# Patient Record
Sex: FEMALE | Race: WHITE | Employment: UNEMPLOYED | URBAN - METROPOLITAN AREA
[De-identification: names, ages, dates, MRNs, and addresses within clinical notes are randomized per-mention and may not be internally consistent; named-entity substitution may affect disease eponyms.]

---

## 2019-03-24 ENCOUNTER — HOSPITAL ENCOUNTER (EMERGENCY)
Age: 34
Discharge: HOME | End: 2019-03-24
Attending: EMERGENCY MEDICINE | Admitting: EMERGENCY MEDICINE
Payer: MEDICAID

## 2019-03-24 VITALS
OXYGEN SATURATION: 99 % | WEIGHT: 160 LBS | DIASTOLIC BLOOD PRESSURE: 89 MMHG | TEMPERATURE: 98.1 F | HEART RATE: 85 BPM | SYSTOLIC BLOOD PRESSURE: 145 MMHG | BODY MASS INDEX: 25.11 KG/M2 | HEIGHT: 67 IN | RESPIRATION RATE: 20 BRPM

## 2019-03-24 DIAGNOSIS — K08.89 PAIN, DENTAL: Primary | ICD-10-CM

## 2019-03-24 PROCEDURE — 74011250637 HC RX REV CODE- 250/637: Performed by: EMERGENCY MEDICINE

## 2019-03-24 PROCEDURE — 99283 EMERGENCY DEPT VISIT LOW MDM: CPT | Performed by: EMERGENCY MEDICINE

## 2019-03-24 RX ORDER — IBUPROFEN 800 MG/1
800 TABLET ORAL
Qty: 20 TAB | Refills: 0 | Status: SHIPPED | OUTPATIENT
Start: 2019-03-24 | End: 2019-03-31

## 2019-03-24 RX ORDER — HYDROCODONE BITARTRATE AND ACETAMINOPHEN 7.5; 325 MG/1; MG/1
1 TABLET ORAL
Status: COMPLETED | OUTPATIENT
Start: 2019-03-24 | End: 2019-03-24

## 2019-03-24 RX ORDER — PENICILLIN V POTASSIUM 500 MG/1
500 TABLET, FILM COATED ORAL 3 TIMES DAILY
Qty: 21 TAB | Refills: 0 | Status: SHIPPED | OUTPATIENT
Start: 2019-03-24 | End: 2019-03-31

## 2019-03-24 RX ORDER — TRAMADOL HYDROCHLORIDE 50 MG/1
50 TABLET ORAL
Qty: 12 TAB | Refills: 0 | Status: SHIPPED | OUTPATIENT
Start: 2019-03-24 | End: 2019-03-27

## 2019-03-24 RX ADMIN — HYDROCODONE BITARTRATE AND ACETAMINOPHEN 1 TABLET: 7.5; 325 TABLET ORAL at 03:01

## 2019-03-24 NOTE — ED TRIAGE NOTES
C/o dental pain r/t left lower. Broken off tooth. Onset couple days pta with worsening tonight. Poor dentition

## 2019-03-24 NOTE — ED NOTES
I have reviewed discharge instructions with the patient. The patient verbalized understanding. Patient left ED via Discharge Method: ambulatory to Home with . Opportunity for questions and clarification provided. Patient given 3 scripts. Medication explained to pt and pt v\u about the medication. Pt in no acute distress at discharge. To continue your aftercare when you leave the hospital, you may receive an automated call from our care team to check in on how you are doing. This is a free service and part of our promise to provide the best care and service to meet your aftercare needs.  If you have questions, or wish to unsubscribe from this service please call [phone]. Thank you for Choosing our [facility].

## 2019-03-24 NOTE — DISCHARGE INSTRUCTIONS
Patient Education        Head or Face Pain: Care Instructions  Your Care Instructions    Common causes of head or face pain are allergies, stress, and injuries. Other causes include tooth problems and sinus infections. Eating certain foods, such as chocolate or cheese, or drinking certain liquids, such as coffee or cola, can cause head pain for some people. If you have mild head pain, you may not need treatment. It is important to watch your symptoms and talk to your doctor if your pain continues or gets worse. Follow-up care is a key part of your treatment and safety. Be sure to make and go to all appointments, and call your doctor if you are having problems. It's also a good idea to know your test results and keep a list of the medicines you take. How can you care for yourself at home? · Take pain medicines exactly as directed. ? If the doctor gave you a prescription medicine for pain, take it as prescribed. ? If you are not taking a prescription pain medicine, ask your doctor if you can take an over-the-counter pain medicine. · Take it easy for the next few days or longer if you are not feeling well. · Use a warm, moist towel or heating pad set on low to relax tight muscles in your shoulder and neck. Have someone gently massage your neck and shoulders. · Put ice or a cold pack on the area for 10 to 20 minutes at a time. Put a thin cloth between the ice and your skin. When should you call for help? Call 911 anytime you think you may need emergency care. For example, call if:    · You have twitching, jerking, or a seizure.     · You passed out (lost consciousness).     · You have symptoms of a stroke. These may include:  ? Sudden numbness, tingling, weakness, or loss of movement in your face, arm, or leg, especially on only one side of your body. ? Sudden vision changes. ? Sudden trouble speaking. ? Sudden confusion or trouble understanding simple statements.   ? Sudden problems with walking or balance. ? A sudden, severe headache that is different from past headaches.     · You have jaw pain and pain in your chest, shoulder, neck, or arm.    Call your doctor now or seek immediate medical care if:    · You have a fever with a stiff neck or a severe headache.     · You have nausea and vomiting, or you cannot keep food or liquids down.    Watch closely for changes in your health, and be sure to contact your doctor if:    · Your head or face pain does not get better as expected. Where can you learn more? Go to http://sheri-dennise.info/. Enter P568 in the search box to learn more about \"Head or Face Pain: Care Instructions. \"  Current as of: September 23, 2018  Content Version: 11.9  © 7510-5004 [employer]. Care instructions adapted under license by [employer] (which disclaims liability or warranty for this information). If you have questions about a medical condition or this instruction, always ask your healthcare professional. [place] [address] any warranty or liability for your use of this information. Patient Education        Tooth and Gum Pain: Care Instructions  Your Care Instructions    The most common causes of dental pain are tooth decay and gum disease. Pain can also be caused by an infection of the tooth (abscess) or the gums. Or you may have pain from a broken or cracked tooth. Other causes of pain include infection and damage to a tooth from nervous grinding of your teeth. A wisdom tooth can be painful when it is coming in but cannot break through the gum. It can also be painful when the tooth is only partway in and extra gum tissue has formed around it. The tissue can get inflamed (pericoronitis), and sometimes it gets infected. Prompt dental care can help find the cause of your toothache and keep the tooth from dying or gum disease from getting worse. Self-care at home may reduce your pain and discomfort.   Follow-up care is a key part of your treatment and safety. Be sure to make and go to all appointments, and call your dentist or doctor if you are having problems. It's also a good idea to know your test results and keep a list of the medicines you take. How can you care for yourself at home? · To reduce pain and facial swelling, put an ice or cold pack on the outside of your cheek for 10 to 20 minutes at a time. Put a thin cloth between the ice and your skin. Do not use heat. · If your doctor prescribed antibiotics, take them as directed. Do not stop taking them just because you feel better. You need to take the full course of antibiotics. · Ask your doctor if you can take an over-the-counter pain medicine, such as acetaminophen (Tylenol), ibuprofen (Advil, Motrin), or naproxen (Aleve). Be safe with medicines. Read and follow all instructions on the label. · Avoid very hot, cold, or sweet foods and drinks if they increase your pain. · Rinse your mouth with warm salt water every 2 hours to help relieve pain and swelling. Mix 1 teaspoon of salt in 8 ounces of water. · Talk to your dentist about using special toothpaste for sensitive teeth. To reduce pain on contact with heat or cold or when brushing, brush with this toothpaste regularly or rub a small amount of the paste on the sensitive area with a clean finger 2 or 3 times a day. Floss gently between your teeth. · Do not smoke or use spit tobacco. Tobacco use can make gum problems worse, decreases your ability to fight infection in your gums, and delays healing. If you need help quitting, talk to your doctor about stop-smoking programs and medicines. These can increase your chances of quitting for good. When should you call for help? Call 911 anytime you think you may need emergency care.  For example, call if:    · You have trouble breathing.    Call your dentist or doctor now or seek immediate medical care if:    · You have signs of infection, such as:  ? Increased pain, swelling, warmth, or redness. ? Red streaks leading from the area. ? Pus draining from the area. ? A fever.    Watch closely for changes in your health, and be sure to contact your doctor if:    · You do not get better as expected. Where can you learn more? Go to http://sheri-dennise.info/. Enter 0363 [id number] in the search box to learn more about \"Tooth and Gum Pain: Care Instructions. \"  Current as of: March 27, 2018  Content Version: 11.9  © 9982-8739 [employer]. Care instructions adapted under license by [employer] (which disclaims liability or warranty for this information). If you have questions about a medical condition or this instruction, always ask your healthcare professional. [address] any warranty or liability for your use of this information.

## 2019-03-25 NOTE — ED PROVIDER NOTES
Presents with complaints of left lower dental pain. Patient has poor dentition and sees a Dentist in [facility]. Reports pain into her left ear worsening this evening. She is also a smoker. The history is provided by the patient. Dental Pain This is a new problem. The current episode started yesterday. The problem occurs constantly. The problem has been rapidly worsening. The pain is located in the left lower mouth. The pain is severe. There was no vomiting, no nausea, no fever and no swelling. She has tried aspirin for the symptoms. The treatment provided no relief. No past medical history on file. No past surgical history on file. No family history on file. Social History Socioeconomic History  Marital status: SINGLE Spouse name: Not on file  Number of children: Not on file  Years of education: Not on file  Highest education level: Not on file Occupational History  Not on file Social Needs  Financial resource strain: Not on file  Food insecurity:  
  Worry: Not on file Inability: Not on file  Transportation needs:  
  Medical: Not on file Non-medical: Not on file Tobacco Use  Smoking status: Current Every Day Smoker Packs/day: 1.00 Substance and Sexual Activity  Alcohol use: No  
 Drug use: No  
 Sexual activity: Not on file Lifestyle  Physical activity:  
  Days per week: Not on file Minutes per session: Not on file  Stress: Not on file Relationships  Social connections:  
  Talks on phone: Not on file Gets together: Not on file Attends Restoration service: Not on file Active member of club or organization: Not on file Attends meetings of clubs or organizations: Not on file Relationship status: Not on file  Intimate partner violence:  
  Fear of current or ex partner: Not on file Emotionally abused: Not on file Physically abused: Not on file Forced sexual activity: Not on file Other Topics Concern  Not on file Social History Narrative  Not on file ALLERGIES: Patient has no known allergies. Review of Systems HENT: Positive for dental problem. All other systems reviewed and are negative. Vitals:  
 03/24/19 0240 03/24/19 0304 BP: (!) 155/93 145/89 Pulse: 92 85 Resp: 20 20 Temp: 97.4 °F (36.3 °C) 98.1 °F (36.7 °C) SpO2: 99% 99% Weight: 72.6 kg (160 lb) Height: 5' 7\" (1.702 m) Physical Exam  
Constitutional: She is oriented to person, place, and time. She appears well-developed and well-nourished. No distress. HENT:  
Head: Normocephalic and atraumatic. Left Ear: External ear normal.  
Poor dentition throughout no abscess or cheek swelling. Left lower molar is brown and rotten. Left TM normal  
Eyes: Conjunctivae are normal. Right eye exhibits no discharge. Left eye exhibits no discharge. Neck: Normal range of motion. Neck supple. Musculoskeletal: Normal range of motion. She exhibits no edema. Neurological: She is alert and oriented to person, place, and time. No cranial nerve deficit. Skin: Skin is warm and dry. Capillary refill takes less than 2 seconds. She is not diaphoretic. Psychiatric: She has a normal mood and affect. Her behavior is normal.  
Nursing note and vitals reviewed. MDM Number of Diagnoses or Management Options Pain, dental:  
Diagnosis management comments: Patient given a dose of pain medication here and antibiotics and pain meds to go home with. SHe should follow up with her dentist. 
 
Risk of Complications, Morbidity, and/or Mortality Presenting problems: low Diagnostic procedures: low Management options: low Patient Progress Patient progress: improved Procedures

## 2019-04-20 ENCOUNTER — APPOINTMENT (OUTPATIENT)
Dept: GENERAL RADIOLOGY | Age: 34
End: 2019-04-20
Attending: EMERGENCY MEDICINE
Payer: MEDICAID

## 2019-04-20 ENCOUNTER — HOSPITAL ENCOUNTER (OUTPATIENT)
Age: 34
Setting detail: OBSERVATION
Discharge: HOME | End: 2019-04-21
Attending: EMERGENCY MEDICINE | Admitting: INTERNAL MEDICINE
Payer: MEDICAID

## 2019-04-20 ENCOUNTER — APPOINTMENT (OUTPATIENT)
Dept: CT IMAGING | Age: 34
End: 2019-04-20
Attending: EMERGENCY MEDICINE
Payer: MEDICAID

## 2019-04-20 DIAGNOSIS — R47.9 DIFFICULTY WITH SPEECH: ICD-10-CM

## 2019-04-20 DIAGNOSIS — F80.81 STUTTERING: ICD-10-CM

## 2019-04-20 DIAGNOSIS — R07.9 CHEST PAIN, UNSPECIFIED TYPE: Primary | ICD-10-CM

## 2019-04-20 PROBLEM — R11.2 NAUSEA AND VOMITING: Status: ACTIVE | Noted: 2019-04-20

## 2019-04-20 PROBLEM — F17.210 SMOKING GREATER THAN 20 PACK YEARS: Status: ACTIVE | Noted: 2019-04-20

## 2019-04-20 PROBLEM — R47.1 DYSARTHRIA: Status: ACTIVE | Noted: 2019-04-20

## 2019-04-20 PROBLEM — D72.829 LEUKOCYTOSIS: Status: ACTIVE | Noted: 2019-04-20

## 2019-04-20 LAB
ALBUMIN SERPL-MCNC: 4 G/DL (ref 3.5–5)
ALBUMIN/GLOB SERPL: 1.4 {RATIO} (ref 1.2–3.5)
ALP SERPL-CCNC: 94 U/L (ref 50–136)
ALT SERPL-CCNC: 19 U/L (ref 12–65)
AMPHET UR QL SCN: POSITIVE
ANION GAP SERPL CALC-SCNC: 7 MMOL/L (ref 7–16)
AST SERPL-CCNC: 17 U/L (ref 15–37)
BARBITURATES UR QL SCN: NEGATIVE
BASOPHILS # BLD: 0 K/UL (ref 0–0.2)
BASOPHILS NFR BLD: 0 % (ref 0–2)
BENZODIAZ UR QL: NEGATIVE
BILIRUB SERPL-MCNC: 0.6 MG/DL (ref 0.2–1.1)
BUN SERPL-MCNC: 9 MG/DL (ref 6–23)
CALCIUM SERPL-MCNC: 8.9 MG/DL (ref 8.3–10.4)
CANNABINOIDS UR QL SCN: NEGATIVE
CHLORIDE SERPL-SCNC: 102 MMOL/L (ref 98–107)
CO2 SERPL-SCNC: 31 MMOL/L (ref 21–32)
COCAINE UR QL SCN: NEGATIVE
CREAT SERPL-MCNC: 0.75 MG/DL (ref 0.6–1)
D DIMER PPP FEU-MCNC: <0.27 UG/ML(FEU)
DIFFERENTIAL METHOD BLD: ABNORMAL
EOSINOPHIL # BLD: 0.1 K/UL (ref 0–0.8)
EOSINOPHIL NFR BLD: 1 % (ref 0.5–7.8)
ERYTHROCYTE [DISTWIDTH] IN BLOOD BY AUTOMATED COUNT: 12.2 % (ref 11.9–14.6)
ETHANOL SERPL-MCNC: <3 MG/DL
GLOBULIN SER CALC-MCNC: 2.9 G/DL (ref 2.3–3.5)
GLUCOSE BLD STRIP.AUTO-MCNC: 99 MG/DL (ref 65–100)
GLUCOSE SERPL-MCNC: 93 MG/DL (ref 65–100)
HCT VFR BLD AUTO: 42.5 % (ref 35.8–46.3)
HGB BLD-MCNC: 14 G/DL (ref 11.7–15.4)
IMM GRANULOCYTES # BLD AUTO: 0 K/UL (ref 0–0.5)
IMM GRANULOCYTES NFR BLD AUTO: 0 % (ref 0–5)
INR BLD: 1 (ref 0.9–1.2)
LYMPHOCYTES # BLD: 4.1 K/UL (ref 0.5–4.6)
LYMPHOCYTES NFR BLD: 35 % (ref 13–44)
MCH RBC QN AUTO: 28.7 PG (ref 26.1–32.9)
MCHC RBC AUTO-ENTMCNC: 32.9 G/DL (ref 31.4–35)
MCV RBC AUTO: 87.3 FL (ref 79.6–97.8)
METHADONE UR QL: NEGATIVE
MONOCYTES # BLD: 0.5 K/UL (ref 0.1–1.3)
MONOCYTES NFR BLD: 5 % (ref 4–12)
NEUTS SEG # BLD: 6.7 K/UL (ref 1.7–8.2)
NEUTS SEG NFR BLD: 58 % (ref 43–78)
NRBC # BLD: 0 K/UL (ref 0–0.2)
OPIATES UR QL: POSITIVE
PCP UR QL: NEGATIVE
PLATELET # BLD AUTO: 286 K/UL (ref 150–450)
PMV BLD AUTO: 10.2 FL (ref 9.4–12.3)
POTASSIUM SERPL-SCNC: 3.6 MMOL/L (ref 3.5–5.1)
PROT SERPL-MCNC: 6.9 G/DL (ref 6.3–8.2)
PT BLD: 12.4 SECS (ref 9.6–11.6)
RBC # BLD AUTO: 4.87 M/UL (ref 4.05–5.2)
SODIUM SERPL-SCNC: 140 MMOL/L (ref 136–145)
TROPONIN I BLD-MCNC: 0 NG/ML (ref 0.02–0.05)
WBC # BLD AUTO: 11.5 K/UL (ref 4.3–11.1)

## 2019-04-20 PROCEDURE — 85025 COMPLETE CBC W/AUTO DIFF WBC: CPT

## 2019-04-20 PROCEDURE — 96372 THER/PROPH/DIAG INJ SC/IM: CPT

## 2019-04-20 PROCEDURE — 99285 EMERGENCY DEPT VISIT HI MDM: CPT | Performed by: EMERGENCY MEDICINE

## 2019-04-20 PROCEDURE — 96361 HYDRATE IV INFUSION ADD-ON: CPT

## 2019-04-20 PROCEDURE — 85610 PROTHROMBIN TIME: CPT

## 2019-04-20 PROCEDURE — 80307 DRUG TEST PRSMV CHEM ANLYZR: CPT

## 2019-04-20 PROCEDURE — 84484 ASSAY OF TROPONIN QUANT: CPT

## 2019-04-20 PROCEDURE — 80053 COMPREHEN METABOLIC PANEL: CPT

## 2019-04-20 PROCEDURE — 81003 URINALYSIS AUTO W/O SCOPE: CPT

## 2019-04-20 PROCEDURE — 82962 GLUCOSE BLOOD TEST: CPT

## 2019-04-20 PROCEDURE — 96360 HYDRATION IV INFUSION INIT: CPT

## 2019-04-20 PROCEDURE — 74011250636 HC RX REV CODE- 250/636: Performed by: INTERNAL MEDICINE

## 2019-04-20 PROCEDURE — 85379 FIBRIN DEGRADATION QUANT: CPT

## 2019-04-20 PROCEDURE — 70450 CT HEAD/BRAIN W/O DYE: CPT

## 2019-04-20 PROCEDURE — 81025 URINE PREGNANCY TEST: CPT

## 2019-04-20 PROCEDURE — 74011250637 HC RX REV CODE- 250/637: Performed by: INTERNAL MEDICINE

## 2019-04-20 PROCEDURE — 99218 HC RM OBSERVATION: CPT

## 2019-04-20 PROCEDURE — 77030020263 HC SOL INJ SOD CL0.9% LFCR 1000ML

## 2019-04-20 PROCEDURE — 71045 X-RAY EXAM CHEST 1 VIEW: CPT

## 2019-04-20 PROCEDURE — 77030032490 HC SLV COMPR SCD KNE COVD -B

## 2019-04-20 PROCEDURE — 93005 ELECTROCARDIOGRAM TRACING: CPT | Performed by: INTERNAL MEDICINE

## 2019-04-20 RX ORDER — SODIUM CHLORIDE 9 MG/ML
125 INJECTION, SOLUTION INTRAVENOUS CONTINUOUS
Status: DISCONTINUED | OUTPATIENT
Start: 2019-04-20 | End: 2019-04-21 | Stop reason: HOSPADM

## 2019-04-20 RX ORDER — ONDANSETRON 2 MG/ML
4 INJECTION INTRAMUSCULAR; INTRAVENOUS
Status: DISCONTINUED | OUTPATIENT
Start: 2019-04-20 | End: 2019-04-21 | Stop reason: HOSPADM

## 2019-04-20 RX ORDER — IBUPROFEN 200 MG
1 TABLET ORAL DAILY
Status: DISCONTINUED | OUTPATIENT
Start: 2019-04-21 | End: 2019-04-21 | Stop reason: HOSPADM

## 2019-04-20 RX ORDER — PRAVASTATIN SODIUM 20 MG/1
40 TABLET ORAL
Status: DISCONTINUED | OUTPATIENT
Start: 2019-04-20 | End: 2019-04-21 | Stop reason: HOSPADM

## 2019-04-20 RX ORDER — GUAIFENESIN 100 MG/5ML
81 LIQUID (ML) ORAL DAILY
Status: DISCONTINUED | OUTPATIENT
Start: 2019-04-21 | End: 2019-04-21 | Stop reason: HOSPADM

## 2019-04-20 RX ORDER — SODIUM CHLORIDE 0.9 % (FLUSH) 0.9 %
5-40 SYRINGE (ML) INJECTION AS NEEDED
Status: DISCONTINUED | OUTPATIENT
Start: 2019-04-20 | End: 2019-04-21 | Stop reason: HOSPADM

## 2019-04-20 RX ORDER — ENOXAPARIN SODIUM 100 MG/ML
40 INJECTION SUBCUTANEOUS EVERY 24 HOURS
Status: DISCONTINUED | OUTPATIENT
Start: 2019-04-20 | End: 2019-04-21 | Stop reason: HOSPADM

## 2019-04-20 RX ORDER — SODIUM CHLORIDE 0.9 % (FLUSH) 0.9 %
5-40 SYRINGE (ML) INJECTION EVERY 8 HOURS
Status: DISCONTINUED | OUTPATIENT
Start: 2019-04-20 | End: 2019-04-21 | Stop reason: HOSPADM

## 2019-04-20 RX ADMIN — SODIUM CHLORIDE 125 ML/HR: 900 INJECTION, SOLUTION INTRAVENOUS at 22:23

## 2019-04-20 RX ADMIN — ENOXAPARIN SODIUM 40 MG: 40 INJECTION SUBCUTANEOUS at 22:24

## 2019-04-20 RX ADMIN — Medication 5 ML: at 22:23

## 2019-04-21 ENCOUNTER — APPOINTMENT (OUTPATIENT)
Dept: MRI IMAGING | Age: 34
End: 2019-04-21
Attending: INTERNAL MEDICINE
Payer: MEDICAID

## 2019-04-21 ENCOUNTER — APPOINTMENT (OUTPATIENT)
Dept: ULTRASOUND IMAGING | Age: 34
End: 2019-04-21
Attending: NURSE PRACTITIONER
Payer: MEDICAID

## 2019-04-21 VITALS
WEIGHT: 160.05 LBS | OXYGEN SATURATION: 95 % | DIASTOLIC BLOOD PRESSURE: 87 MMHG | TEMPERATURE: 97.8 F | RESPIRATION RATE: 17 BRPM | SYSTOLIC BLOOD PRESSURE: 129 MMHG | BODY MASS INDEX: 25.07 KG/M2 | HEART RATE: 84 BPM

## 2019-04-21 LAB
APPEARANCE UR: NORMAL
ATRIAL RATE: 76 BPM
BILIRUB UR QL: NEGATIVE
CALCULATED P AXIS, ECG09: -44 DEGREES
CALCULATED R AXIS, ECG10: -36 DEGREES
CALCULATED T AXIS, ECG11: 47 DEGREES
CHOLEST SERPL-MCNC: 179 MG/DL
COLOR UR: YELLOW
DIAGNOSIS, 93000: NORMAL
ERYTHROCYTE [DISTWIDTH] IN BLOOD BY AUTOMATED COUNT: 12.2 % (ref 11.9–14.6)
EST. AVERAGE GLUCOSE BLD GHB EST-MCNC: 120 MG/DL
GLUCOSE UR STRIP.AUTO-MCNC: NEGATIVE MG/DL
HBA1C MFR BLD: 5.8 % (ref 4.8–6)
HCG UR QL: NEGATIVE
HCT VFR BLD AUTO: 41.4 % (ref 35.8–46.3)
HDLC SERPL-MCNC: 31 MG/DL (ref 40–60)
HDLC SERPL: 5.8 {RATIO}
HGB BLD-MCNC: 13.5 G/DL (ref 11.7–15.4)
HGB UR QL STRIP: NEGATIVE
KETONES UR QL STRIP.AUTO: NEGATIVE MG/DL
LDLC SERPL CALC-MCNC: 101.6 MG/DL
LEUKOCYTE ESTERASE UR QL STRIP.AUTO: NEGATIVE
LIPID PROFILE,FLP: ABNORMAL
MAGNESIUM SERPL-MCNC: 1.9 MG/DL (ref 1.8–2.4)
MCH RBC QN AUTO: 28.7 PG (ref 26.1–32.9)
MCHC RBC AUTO-ENTMCNC: 32.6 G/DL (ref 31.4–35)
MCV RBC AUTO: 87.9 FL (ref 79.6–97.8)
NITRITE UR QL STRIP.AUTO: NEGATIVE
NRBC # BLD: 0 K/UL (ref 0–0.2)
P-R INTERVAL, ECG05: 120 MS
PH UR STRIP: 6 [PH] (ref 5–9)
PHOSPHATE SERPL-MCNC: 3.6 MG/DL (ref 2.5–4.5)
PLATELET # BLD AUTO: 262 K/UL (ref 150–450)
PMV BLD AUTO: 10.2 FL (ref 9.4–12.3)
PROT UR STRIP-MCNC: NEGATIVE MG/DL
Q-T INTERVAL, ECG07: 390 MS
QRS DURATION, ECG06: 90 MS
QTC CALCULATION (BEZET), ECG08: 438 MS
RBC # BLD AUTO: 4.71 M/UL (ref 4.05–5.2)
SP GR UR REFRACTOMETRY: 1.01 (ref 1–1.02)
TRIGL SERPL-MCNC: 232 MG/DL (ref 35–150)
TROPONIN I SERPL-MCNC: <0.02 NG/ML (ref 0.02–0.05)
TROPONIN I SERPL-MCNC: <0.02 NG/ML (ref 0.02–0.05)
TSH SERPL DL<=0.005 MIU/L-ACNC: 1.14 UIU/ML (ref 0.36–3.74)
UROBILINOGEN UR QL STRIP.AUTO: 0.2 EU/DL (ref 0.2–1)
VENTRICULAR RATE, ECG03: 76 BPM
VLDLC SERPL CALC-MCNC: 46.4 MG/DL (ref 6–23)
WBC # BLD AUTO: 8 K/UL (ref 4.3–11.1)

## 2019-04-21 PROCEDURE — 83036 HEMOGLOBIN GLYCOSYLATED A1C: CPT

## 2019-04-21 PROCEDURE — 83735 ASSAY OF MAGNESIUM: CPT

## 2019-04-21 PROCEDURE — 96361 HYDRATE IV INFUSION ADD-ON: CPT

## 2019-04-21 PROCEDURE — 93880 EXTRACRANIAL BILAT STUDY: CPT

## 2019-04-21 PROCEDURE — 97165 OT EVAL LOW COMPLEX 30 MIN: CPT

## 2019-04-21 PROCEDURE — 74011250637 HC RX REV CODE- 250/637: Performed by: INTERNAL MEDICINE

## 2019-04-21 PROCEDURE — 84443 ASSAY THYROID STIM HORMONE: CPT

## 2019-04-21 PROCEDURE — 92610 EVALUATE SWALLOWING FUNCTION: CPT

## 2019-04-21 PROCEDURE — 70553 MRI BRAIN STEM W/O & W/DYE: CPT

## 2019-04-21 PROCEDURE — 99218 HC RM OBSERVATION: CPT

## 2019-04-21 PROCEDURE — 77030020263 HC SOL INJ SOD CL0.9% LFCR 1000ML

## 2019-04-21 PROCEDURE — 80061 LIPID PANEL: CPT

## 2019-04-21 PROCEDURE — 36415 COLL VENOUS BLD VENIPUNCTURE: CPT

## 2019-04-21 PROCEDURE — 84100 ASSAY OF PHOSPHORUS: CPT

## 2019-04-21 PROCEDURE — 74011250636 HC RX REV CODE- 250/636: Performed by: INTERNAL MEDICINE

## 2019-04-21 PROCEDURE — 74011250637 HC RX REV CODE- 250/637: Performed by: NURSE PRACTITIONER

## 2019-04-21 PROCEDURE — 85027 COMPLETE CBC AUTOMATED: CPT

## 2019-04-21 PROCEDURE — 99214 OFFICE O/P EST MOD 30 MIN: CPT | Performed by: PSYCHIATRY & NEUROLOGY

## 2019-04-21 PROCEDURE — 84484 ASSAY OF TROPONIN QUANT: CPT

## 2019-04-21 PROCEDURE — A9575 INJ GADOTERATE MEGLUMI 0.1ML: HCPCS | Performed by: INTERNAL MEDICINE

## 2019-04-21 RX ORDER — ACETAMINOPHEN 325 MG/1
650 TABLET ORAL
Status: DISCONTINUED | OUTPATIENT
Start: 2019-04-21 | End: 2019-04-21 | Stop reason: HOSPADM

## 2019-04-21 RX ORDER — GADOTERATE MEGLUMINE 376.9 MG/ML
15 INJECTION INTRAVENOUS
Status: COMPLETED | OUTPATIENT
Start: 2019-04-21 | End: 2019-04-21

## 2019-04-21 RX ORDER — PRAVASTATIN SODIUM 40 MG/1
40 TABLET ORAL
Qty: 30 TAB | Refills: 0 | Status: SHIPPED | OUTPATIENT
Start: 2019-04-21 | End: 2019-05-21

## 2019-04-21 RX ORDER — GUAIFENESIN 100 MG/5ML
81 LIQUID (ML) ORAL DAILY
Qty: 30 TAB | Refills: 0 | Status: SHIPPED | OUTPATIENT
Start: 2019-04-22 | End: 2019-05-22

## 2019-04-21 RX ORDER — SODIUM CHLORIDE 0.9 % (FLUSH) 0.9 %
10 SYRINGE (ML) INJECTION
Status: COMPLETED | OUTPATIENT
Start: 2019-04-21 | End: 2019-04-21

## 2019-04-21 RX ADMIN — ASPIRIN 81 MG 81 MG: 81 TABLET ORAL at 12:15

## 2019-04-21 RX ADMIN — GADOTERATE MEGLUMINE 15 ML: 376.9 INJECTION INTRAVENOUS at 09:41

## 2019-04-21 RX ADMIN — Medication 10 ML: at 09:41

## 2019-04-21 RX ADMIN — SODIUM CHLORIDE 125 ML/HR: 900 INJECTION, SOLUTION INTRAVENOUS at 08:38

## 2019-04-21 RX ADMIN — ACETAMINOPHEN 650 MG: 325 TABLET, FILM COATED ORAL at 12:14

## 2019-04-21 NOTE — PROGRESS NOTES
Problem: Falls - Risk of 
Goal: *Absence of Falls Description Document Fouzia Hallman Fall Risk and appropriate interventions in the flowsheet.  
Outcome: Progressing Towards Goal

## 2019-04-21 NOTE — PROGRESS NOTES
04/20/19 2209 Dual Skin Pressure Injury Assessment Dual Skin Pressure Injury Assessment X Second Care Provider (Based on [address] [place] [address]) Luis, RN Foot Bilateral 
(Scattered abrasions.) Skin Integumentary Skin Integumentary (WDL) X Skin Color Appropriate for ethnicity Skin Condition/Temp Rash 
(Rash on back.) Skin Integrity Tattoos (comment); Piercing(s) Turgor Non-tenting Hair Growth Sparce Varicosities Absent Wound Prevention and Protection Methods Orientation of Wound Prevention Posterior Location of Wound Prevention Sacrum/Coccyx Dressing Present  No  
Wound Offloading (Prevention Methods) Bed, pressure reduction mattress

## 2019-04-21 NOTE — PROGRESS NOTES
TRANSFER - IN REPORT: 
 
Verbal report received from Martin Alcala RN on Nida Luna  being received from ER for routine progression of care Report consisted of patients Situation, Background, Assessment and  
Recommendations(SBAR). Information from the following report(s) SBAR and ED Summary was reviewed with the receiving nurse. Opportunity for questions and clarification was provided. Assessment completed upon patients arrival to unit and care assumed.

## 2019-04-21 NOTE — ED NOTES
Pt arrives back in ED at this time, in [address] and pt continues on VS monitoring and cardiac monitoring. Patient denies discomfort at this time. Side rails elevated x2, bed in low and locked position, family at bedside.

## 2019-04-21 NOTE — ED NOTES
Pt at CT with Ed staff remains at bedside, pt has IV established (see IV flow sheet) and and pt remains on cardiac monitor, vital sign monitoring, VSS at this time.

## 2019-04-21 NOTE — ED PROVIDER NOTES
28-year-old female presents with complaint stuttering speech and slurred speech that began at around 6 PM today. Family member present at bedside states she was last known normal at around 6 PM.  Denies focal weakness, numbness, tingling, facial droop, nausea, vomiting, vision disturbance, fever, chills, abdominal pain, recent trauma or injury. Patient denies history of previous CVA. Patient denies history of having stuttering issue in the past.  
 
The history is provided by the patient and the spouse. No  was used. Chest Pain (Angina) This is a new problem. The current episode started more than 2 days ago. The problem has not changed since onset. The problem occurs daily. The pain is associated with rest. The pain is present in the substernal region. The pain is at a severity of 2/10. The pain is mild. The quality of the pain is described as sharp. The pain does not radiate. Associated symptoms include shortness of breath. Pertinent negatives include no abdominal pain, no back pain, no cough, no diaphoresis, no dizziness, no exertional chest pressure, no fever, no lower extremity edema, no malaise/fatigue, no nausea, no near-syncope, no numbness, no orthopnea, no palpitations, no vomiting and no weakness. She has tried nothing for the symptoms. The treatment provided no relief. History reviewed. No pertinent past medical history. History reviewed. No pertinent surgical history. History reviewed. No pertinent family history. Social History Socioeconomic History  Marital status: SINGLE Spouse name: Not on file  Number of children: Not on file  Years of education: Not on file  Highest education level: Not on file Occupational History  Not on file Social Needs  Financial resource strain: Not on file  Food insecurity:  
  Worry: Not on file Inability: Not on file  Transportation needs:  
  Medical: Not on file Non-medical: Not on file Tobacco Use  Smoking status: Current Every Day Smoker Packs/day: 1.00 Substance and Sexual Activity  Alcohol use: No  
 Drug use: No  
 Sexual activity: Not on file Lifestyle  Physical activity:  
  Days per week: Not on file Minutes per session: Not on file  Stress: Not on file Relationships  Social connections:  
  Talks on phone: Not on file Gets together: Not on file Attends Islam service: Not on file Active member of club or organization: Not on file Attends meetings of clubs or organizations: Not on file Relationship status: Not on file  Intimate partner violence:  
  Fear of current or ex partner: Not on file Emotionally abused: Not on file Physically abused: Not on file Forced sexual activity: Not on file Other Topics Concern  Not on file Social History Narrative  Not on file ALLERGIES: Patient has no known allergies. Review of Systems Constitutional: Negative for chills, diaphoresis, fatigue, fever and malaise/fatigue. HENT: Negative for congestion, trouble swallowing and voice change. Respiratory: Positive for shortness of breath. Negative for cough. Cardiovascular: Positive for chest pain. Negative for palpitations, orthopnea, leg swelling and near-syncope. Gastrointestinal: Negative for abdominal pain, diarrhea, nausea and vomiting. Genitourinary: Negative for dysuria, flank pain, vaginal bleeding and vaginal discharge. Musculoskeletal: Negative for back pain and myalgias. Skin: Negative for rash. Neurological: Positive for speech difficulty. Negative for dizziness, weakness, light-headedness and numbness. Vitals:  
 04/20/19 2016 BP: (!) 156/101 Pulse: 82 Resp: 20 Temp: 98.3 °F (36.8 °C) SpO2: 100% Physical Exam  
Constitutional: She is oriented to person, place, and time. She appears well-developed. Patient well-appearing and in no acute distress. HENT:  
Head: Normocephalic and atraumatic. MMM. No tonsillar erythema or exudate noted. Uvula midline. Eyes: Pupils are equal, round, and reactive to light. Conjunctivae and EOM are normal.  
No nystagmus. Neck: Neck supple. No JVD present. FROM. Cardiovascular: Normal rate, regular rhythm and normal heart sounds. Radial pulses 2+ and equal bilaterally. Pulmonary/Chest: Effort normal.  
CTAB. Abdominal: Soft. Bowel sounds are normal. She exhibits no distension. There is no tenderness. Soft, NTND. Musculoskeletal: Normal range of motion. She exhibits no edema. Neurological: She is alert and oriented to person, place, and time. No cranial nerve deficit or sensory deficit. Stuttering speech with dysarthria present. Strength 5/5 throughout. Normal sensory exam.  No facial droop. No dysarthria. Skin: Skin is warm and dry. No rash. Psychiatric: She has a normal mood and affect. Nursing note and vitals reviewed. MDM Number of Diagnoses or Management Options Chest pain, unspecified type: new and requires workup Difficulty with speech: new and requires workup Stuttering: new and requires workup Diagnosis management comments: Pulled into room 18 to evaluate patient for stuttering. Patient with new onset stuttering/dysarthria. Code S called immediately after evaluation. 
============================== Neurology states the patient is not a TPA candidate. Recommends Hospitalist admission. Recommends that she received aspirin and MRI head with contrast. 
Patient given  mg po. Amount and/or Complexity of Data Reviewed Clinical lab tests: ordered and reviewed Tests in the radiology section of CPT®: ordered and reviewed Tests in the medicine section of CPT®: ordered and reviewed Review and summarize past medical records: yes Discuss the patient with other providers: yes Independent visualization of images, tracings, or specimens: yes Risk of Complications, Morbidity, and/or Mortality Presenting problems: moderate Diagnostic procedures: moderate Management options: moderate Patient Progress Patient progress: stable ED Course as of Apr 20 2116 Sat Apr 20, 2019 2028 Signed up for patient upon her being immediately placed in room for sign up to occur.   
 [DF] 2115 CXR IMPRESSION: Normal chest.  
 [DF] 2115 CT head IMPRESSION: 
 
Unremarkable brain CT without intravenous contrast.  
 [DF] 2115 TeleNeuro states not tPA candidate.   
 [DF] ED Course User Index 
[DF] Hadley Oliveira MD  
 
 
EKG Date/Time: 4/20/2019 9:46 PM 
Performed by: Hadley Oliveira MD 
Authorized by: Hadley Oliveira MD  
 
ECG reviewed by ED Physician in the absence of a cardiologist: yes Rate:  
  ECG rate:  76 ECG rate assessment: normal   
Rhythm:  
  Rhythm: sinus rhythm Ectopy:  
  Ectopy: none QRS:  
  QRS axis:  Normal 
  QRS intervals:  Normal 
Conduction:  
  Conduction: normal   
ST segments: ST segments:  Normal 
T waves:  
  T waves: normal   
 
 
 
 
 
 NIHSS Stroke Scale Interval: Baseline Time: 8:26 PM 
 
Person Administering Scale: John Cid MD 
Administer stroke scale items in the order listed. Record performance in each category after each subscale exam. Do not go back and change scores. Follow directions provided for each exam technique. Scores should reflect what the patient does, not what the clinician thinks the patient can do. The clinician should record answers while administering the exam and work quickly. Except where indicated, the patient should not be coached (i.e., repeated requests to patient to make a special effort). 1a  Level of consciousness: 0=alert; keenly responsive 1b. LOC questions:  0=Answers both questions correctly 1c. LOC commands: 0=Performs both tasks correctly 2.  Best Gaze: 0=normal  
3. Visual: 0=No visual loss 4. Facial Palsy: 0=Normal symmetric movement 5a. Motor left arm: 0=No drift, arm holds 90 (or 45) degrees for full 10 seconds 5b. Motor right arm: 0=No drift, arm holds 90 (or 45) degrees for full 10 seconds 6a. Motor left le=No drift; leg holds 30-degree position for full 5 seconds. 6b  Motor right le=No drift; leg holds 30-degree position for full 5 seconds. 7. Limb Ataxia: 0=Absent 8. Sensory: 0=Normal; no sensory loss 9. Best Language:  1=Mild to moderate aphasia; some obvious loss of fluency or facility of comprehension without significant limitation on ideas expressed or form of expression. Reduction of speech and/or comprehension, however, makes conversation about provided materials difficult or impossible. For example, in conversation about provided materials, examiner can identify picture or naming card content from patients response. 10. Dysarthria: 1=Mild to moderate, patient slurs at least some words and at worst, can be understood with some difficulty 11. Extinction and Inattention: 0=No abnormality Total:   1-4= Minor stroke XR CHEST PORT Final Result IMPRESSION: Normal chest.  
  
CT HEAD WO CONT Final Result IMPRESSION:  
  
Unremarkable brain CT without intravenous contrast.  
  
Date of Dictation: 2019 8:29 PM  
  
  
 
 
 
Results Include: 
 
Recent Results (from the past 24 hour(s)) CBC WITH AUTOMATED DIFF Collection Time: 19  8:23 PM  
Result Value Ref Range WBC 11.5 (H) 4.3 - 11.1 K/uL  
 RBC 4.87 4.05 - 5.2 M/uL  
 HGB 14.0 11.7 - 15.4 g/dL HCT 42.5 35.8 - 46.3 % MCV 87.3 79.6 - 97.8 FL  
 MCH 28.7 26.1 - 32.9 PG  
 MCHC 32.9 31.4 - 35.0 g/dL  
 RDW 12.2 11.9 - 14.6 % PLATELET 212 246 - 172 K/uL MPV 10.2 9.4 - 12.3 FL ABSOLUTE NRBC 0.00 0.0 - 0.2 K/uL  
 DF AUTOMATED NEUTROPHILS 58 43 - 78 % LYMPHOCYTES 35 13 - 44 %  MONOCYTES 5 4.0 - 12.0 %  
 EOSINOPHILS 1 0.5 - 7.8 % BASOPHILS 0 0.0 - 2.0 % IMMATURE GRANULOCYTES 0 0.0 - 5.0 %  
 ABS. NEUTROPHILS 6.7 1.7 - 8.2 K/UL  
 ABS. LYMPHOCYTES 4.1 0.5 - 4.6 K/UL  
 ABS. MONOCYTES 0.5 0.1 - 1.3 K/UL  
 ABS. EOSINOPHILS 0.1 0.0 - 0.8 K/UL  
 ABS. BASOPHILS 0.0 0.0 - 0.2 K/UL  
 ABS. IMM. GRANS. 0.0 0.0 - 0.5 K/UL METABOLIC PANEL, COMPREHENSIVE Collection Time: 04/20/19  8:23 PM  
Result Value Ref Range Sodium 140 136 - 145 mmol/L Potassium 3.6 3.5 - 5.1 mmol/L Chloride 102 98 - 107 mmol/L  
 CO2 31 21 - 32 mmol/L Anion gap 7 7 - 16 mmol/L Glucose 93 65 - 100 mg/dL BUN 9 6 - 23 MG/DL Creatinine 0.75 0.6 - 1.0 MG/DL  
 GFR est AA >60 >60 ml/min/1.73m2 GFR est non-AA >60 >60 ml/min/1.73m2 Calcium 8.9 8.3 - 10.4 MG/DL Bilirubin, total 0.6 0.2 - 1.1 MG/DL  
 ALT (SGPT) 19 12 - 65 U/L  
 AST (SGOT) 17 15 - 37 U/L Alk. phosphatase 94 50 - 136 U/L Protein, total 6.9 6.3 - 8.2 g/dL Albumin 4.0 3.5 - 5.0 g/dL Globulin 2.9 2.3 - 3.5 g/dL A-G Ratio 1.4 1.2 - 3.5 ETHYL ALCOHOL Collection Time: 04/20/19  8:23 PM  
Result Value Ref Range ALCOHOL(ETHYL),SERUM <3 MG/DL  
GLUCOSE, POC Collection Time: 04/20/19  8:28 PM  
Result Value Ref Range Glucose (POC) 99 65 - 100 mg/dL POC TROPONIN-I Collection Time: 04/20/19  8:28 PM  
Result Value Ref Range Troponin-I (POC) 0 (L) 0.02 - 0.05 ng/ml POC PT/INR Collection Time: 04/20/19  8:32 PM  
Result Value Ref Range Prothrombin time (POC) 12.4 (H) 9.6 - 11.6 SECS  
 INR (POC) 1.0 0.9 - 1.2 Justino Lugo MD; 4/20/2019 @8:25 PM Voice dictation software was used during the making of this note. This software is not perfect and grammatical and other typographical errors may be present.   This note has not been proofread for errors. 
===================================================================

## 2019-04-21 NOTE — ED NOTES
Dr Rachelle Betts at patient assessing at this time, code S called at this time and pt to CT with ED staff Karoline Menjivar and Anabella Hollingsworth RN

## 2019-04-21 NOTE — DISCHARGE SUMMARY
Hospitalist Discharge Summary     Admit Date:  2019  8:23 PM   Name:  Jose Pimentel   Age:  29 y.o.  :  1985   MRN:  [id number]   PCP:  None  Treatment Team: Attending Provider: Eduar Johnson MD; Occupational Therapist: ALONSO Ricektts/IBETH; Consulting Provider: Ana Luisa Damon DO    Problem List for this Hospitalization:  Hospital Problems as of 2019 Never Reviewed          Codes Class Noted - Resolved POA    * (Principal) Dysarthria ICD-10-CM: R47.1  ICD-9-CM: 784.51  2019 - Present Yes        Leukocytosis ICD-10-CM: Q58.730  ICD-9-CM: 288.60  2019 - Present Yes        Acute chest pain ICD-10-CM: R07.9  ICD-9-CM: 786.50  2019 - Present Yes        Smoking greater than 20 pack years ICD-10-CM: F17.210  ICD-9-CM: 305.1  2019 - Present Yes        Nausea and vomiting ICD-10-CM: R11.2  ICD-9-CM: 787.01  2019 - Present Yes                Admission HPI from 2019:    \" Review H&P for details of admission\"    Hospital Course:   Patient is a 28 yo  CF with a PMH of smoking who presented to the ER with reports of   persistent substernal chest pain and heaviness with associated nausea and vomiting x 3 days and 5-6 hours of acute onset dysarthria (stuttering). Troponin negative. CT negative. MRI negative. UDS with amphetamines and Opiates. Neurology consulted. Patient with functional neurological disorder. Speech therapy evaluated patient. Patient to follow up as OP for further treatment. Patient to follow up with PCP in 3-5 days. Case management to arrange provider to call patient next week. Follow up instructions and discharge meds at bottom of this note. Plan was discussed with patient. All questions answered. Patient was stable at time of discharge.     Diagnostic Imaging/Tests:   Mri Brain W Wo Cont    Result Date: 2019  MRI BRAIN WITHOUT and WITH CONTRAST, 2019 History: Dysarthria for 5 to 6 hours associated with nausea, vomiting, and chest pain/heaviness. Comparison:  CT head without contras 4/21/2019t Technique:  Sagittal T1, axial T1, T2, T2 FLAIR, T2 gradient recalled echo, diffusion with ADC map were followed by 15 cc intravenous Dotarem after which axial, sagittal and coronal T1 images were repeated. Findings: Diffusion-weighted images show no evidence of acute infarction. No abnormal signal changes are seen on gradient recalled echo images, T1-weighted images, or T2 FLAIR images to suggest potential acute hemorrhage. No evidence for acute hydrocephalus. No abnormal extra axial fluid collections are seen. Orbits show no gross abnormality. No inflammatory changes are noted the paranasal sinuses, middle ears and mastoid air cells. Following the administration of intravenous contrast, no enhancing mass lesion or worrisome pattern of enhancement is seen. Normal enhancement is seen of the dural venous sinuses, cavernous sinus, and central intracranial arteries. IMPRESSION: 1. No acute or worrisome findings evident on today's pre and postcontrast study. Ct Head Wo Cont    Result Date: 4/20/2019  CT HEAD WITHOUT CONTRAST HISTORY:  CVA. COMPARISON: None TECHNIQUE: Axial imaging was performed without intravenous contrast utilizing 5mm slice thickness. Sagittal and coronal reformats were performed. Radiation dose reduction techniques were used for this study. Our CT scanner uses one or all of the following: Automated exposure control, adjustment of the MAS or KUB according to patient's size and iterative reconstruction. FINDINGS:    *BRAIN:    -  There are no early signs of territorial or lacunar infarction by CT.    -  No intracranial mass, hematoma, or hydrocephalus.    -  No gross white matter abnormality by CT. *VISUALIZED PARANASAL SINUSES: Well aerated. *MASTOIDS:  Clear. *CALVARIUM AND SCALP: Unremarkable.      IMPRESSION: Unremarkable brain CT without intravenous contrast. Date of Dictation: 4/20/2019 8:29 PM     Xr Chest Port    Result Date: 4/20/2019  EXAM: XR CHEST PORT INDICATION: CP COMPARISON: None. FINDINGS: A portable AP radiograph of the chest was obtained at 2032 hours. The patient is on a cardiac monitor. The lungs are clear. The cardiac and mediastinal contours and pulmonary vascularity are normal.  The bones and soft tissues are grossly within normal limits. IMPRESSION: Normal chest.      Echocardiogram results:  No results found for this visit on 04/20/19.       All Micro Results     None          Labs: Results:       BMP, Mg, Phos Recent Labs     04/21/19 0402 04/20/19 2023   NA  --  140   K  --  3.6   CL  --  102   CO2  --  31   AGAP  --  7   BUN  --  9   CREA  --  0.75   CA  --  8.9   GLU  --  93   MG 1.9  --    PHOS 3.6  --       CBC Recent Labs     04/21/19 0402 04/20/19 2023   WBC 8.0 11.5*   RBC 4.71 4.87   HGB 13.5 14.0   HCT 41.4 42.5    286   GRANS  --  58   LYMPH  --  35   EOS  --  1   MONOS  --  5   BASOS  --  0   IG  --  0   ANEU  --  6.7   ABL  --  4.1   ROYAL  --  0.1   ABM  --  0.5   ABB  --  0.0   AIG  --  0.0      LFT Recent Labs     04/20/19 2023   SGOT 17   ALT 19   AP 94   TP 6.9   ALB 4.0   GLOB 2.9   AGRAT 1.4      Cardiac Testing Lab Results   Component Value Date/Time    Troponin-I, Qt. <0.02 (L) 04/21/2019 11:45 AM    Troponin-I, Qt. <0.02 (L) 04/21/2019 04:02 AM      Coagulation Tests Lab Results   Component Value Date/Time    INR (POC) 1.0 04/20/2019 08:32 PM      A1c Lab Results   Component Value Date/Time    Hemoglobin A1c 5.8 04/21/2019 04:02 AM      Lipid Panel Lab Results   Component Value Date/Time    Cholesterol, total 179 04/21/2019 04:02 AM    HDL Cholesterol 31 (L) 04/21/2019 04:02 AM    LDL, calculated 101.6 (H) 04/21/2019 04:02 AM    VLDL, calculated 46.4 (H) 04/21/2019 04:02 AM    Triglyceride 232 (H) 04/21/2019 04:02 AM    CHOL/HDL Ratio 5.8 04/21/2019 04:02 AM      Thyroid Panel Lab Results   Component Value Date/Time    TSH 1.140 04/21/2019 04:02 AM        Most Recent UA Lab Results   Component Value Date/Time    Color YELLOW 04/20/2019 10:12 PM    Appearance CLOUDY 04/20/2019 10:12 PM    Specific gravity 1.015 04/20/2019 10:12 PM    pH (UA) 6.0 04/20/2019 10:12 PM    Protein NEGATIVE  04/20/2019 10:12 PM    Glucose NEGATIVE  04/20/2019 10:12 PM    Ketone NEGATIVE  04/20/2019 10:12 PM    Bilirubin NEGATIVE  04/20/2019 10:12 PM    Blood NEGATIVE  04/20/2019 10:12 PM    Urobilinogen 0.2 04/20/2019 10:12 PM    Nitrites NEGATIVE  04/20/2019 10:12 PM    Leukocyte Esterase NEGATIVE  04/20/2019 10:12 PM        No Known Allergies    There is no immunization history on file for this patient. All Labs from Last 24 Hrs:  Recent Results (from the past 24 hour(s))   EKG, 12 LEAD, INITIAL    Collection Time: 04/20/19  8:07 PM   Result Value Ref Range    Ventricular Rate 76 BPM    Atrial Rate 76 BPM    P-R Interval 120 ms    QRS Duration 90 ms    Q-T Interval 390 ms    QTC Calculation (Bezet) 438 ms    Calculated P Axis -44 degrees    Calculated R Axis -36 degrees    Calculated T Axis 47 degrees    Diagnosis       Unusual P axis, possible ectopic atrial rhythm  Left axis deviation  Abnormal ECG  No previous ECGs available  Confirmed by ST BRANDON LOVE MD (), EBENEZER CARLSON ([id number]) on 4/21/2019 8:54:31 AM     CBC WITH AUTOMATED DIFF    Collection Time: 04/20/19  8:23 PM   Result Value Ref Range    WBC 11.5 (H) 4.3 - 11.1 K/uL    RBC 4.87 4.05 - 5.2 M/uL    HGB 14.0 11.7 - 15.4 g/dL    HCT 42.5 35.8 - 46.3 %    MCV 87.3 79.6 - 97.8 FL    MCH 28.7 26.1 - 32.9 PG    MCHC 32.9 31.4 - 35.0 g/dL    RDW 12.2 11.9 - 14.6 %    PLATELET 379 311 - 665 K/uL    MPV 10.2 9.4 - 12.3 FL    ABSOLUTE NRBC 0.00 0.0 - 0.2 K/uL    DF AUTOMATED      NEUTROPHILS 58 43 - 78 %    LYMPHOCYTES 35 13 - 44 %    MONOCYTES 5 4.0 - 12.0 %    EOSINOPHILS 1 0.5 - 7.8 %    BASOPHILS 0 0.0 - 2.0 %    IMMATURE GRANULOCYTES 0 0.0 - 5.0 %    ABS. NEUTROPHILS 6.7 1.7 - 8.2 K/UL    ABS. LYMPHOCYTES 4.1 0.5 - 4.6 K/UL    ABS.  MONOCYTES 0.5 0.1 - 1.3 K/UL    ABS. EOSINOPHILS 0.1 0.0 - 0.8 K/UL    ABS. BASOPHILS 0.0 0.0 - 0.2 K/UL    ABS. IMM. GRANS. 0.0 0.0 - 0.5 K/UL   METABOLIC PANEL, COMPREHENSIVE    Collection Time: 04/20/19  8:23 PM   Result Value Ref Range    Sodium 140 136 - 145 mmol/L    Potassium 3.6 3.5 - 5.1 mmol/L    Chloride 102 98 - 107 mmol/L    CO2 31 21 - 32 mmol/L    Anion gap 7 7 - 16 mmol/L    Glucose 93 65 - 100 mg/dL    BUN 9 6 - 23 MG/DL    Creatinine 0.75 0.6 - 1.0 MG/DL    GFR est AA >60 >60 ml/min/1.73m2    GFR est non-AA >60 >60 ml/min/1.73m2    Calcium 8.9 8.3 - 10.4 MG/DL    Bilirubin, total 0.6 0.2 - 1.1 MG/DL    ALT (SGPT) 19 12 - 65 U/L    AST (SGOT) 17 15 - 37 U/L    Alk.  phosphatase 94 50 - 136 U/L    Protein, total 6.9 6.3 - 8.2 g/dL    Albumin 4.0 3.5 - 5.0 g/dL    Globulin 2.9 2.3 - 3.5 g/dL    A-G Ratio 1.4 1.2 - 3.5     ETHYL ALCOHOL    Collection Time: 04/20/19  8:23 PM   Result Value Ref Range    ALCOHOL(ETHYL),SERUM <3 MG/DL   D DIMER    Collection Time: 04/20/19  8:23 PM   Result Value Ref Range    D DIMER <0.27 <0.56 ug/ml(FEU)   GLUCOSE, POC    Collection Time: 04/20/19  8:28 PM   Result Value Ref Range    Glucose (POC) 99 65 - 100 mg/dL   POC TROPONIN-I    Collection Time: 04/20/19  8:28 PM   Result Value Ref Range    Troponin-I (POC) 0 (L) 0.02 - 0.05 ng/ml   POC PT/INR    Collection Time: 04/20/19  8:32 PM   Result Value Ref Range    Prothrombin time (POC) 12.4 (H) 9.6 - 11.6 SECS    INR (POC) 1.0 0.9 - 1.2     HCG URINE, QL    Collection Time: 04/20/19 10:12 PM   Result Value Ref Range    HCG urine, QL NEGATIVE  NEG     URINALYSIS W/ RFLX MICROSCOPIC    Collection Time: 04/20/19 10:12 PM   Result Value Ref Range    Color YELLOW      Appearance CLOUDY      Specific gravity 1.015 1.001 - 1.023      pH (UA) 6.0 5.0 - 9.0      Protein NEGATIVE  NEG mg/dL    Glucose NEGATIVE  mg/dL    Ketone NEGATIVE  NEG mg/dL    Bilirubin NEGATIVE  NEG      Blood NEGATIVE  NEG      Urobilinogen 0.2 0.2 - 1.0 EU/dL Nitrites NEGATIVE  NEG      Leukocyte Esterase NEGATIVE  NEG     DRUG SCREEN, URINE    Collection Time: 04/20/19 10:13 PM   Result Value Ref Range    PCP(PHENCYCLIDINE) NEGATIVE       BENZODIAZEPINES NEGATIVE       COCAINE NEGATIVE       AMPHETAMINES POSITIVE      METHADONE NEGATIVE       THC (TH-CANNABINOL) NEGATIVE       OPIATES POSITIVE      BARBITURATES NEGATIVE      LIPID PANEL    Collection Time: 04/21/19  4:02 AM   Result Value Ref Range    LIPID PROFILE          Cholesterol, total 179 <200 MG/DL    Triglyceride 232 (H) 35 - 150 MG/DL    HDL Cholesterol 31 (L) 40 - 60 MG/DL    LDL, calculated 101.6 (H) <100 MG/DL    VLDL, calculated 46.4 (H) 6.0 - 23.0 MG/DL    CHOL/HDL Ratio 5.8     HEMOGLOBIN A1C WITH EAG    Collection Time: 04/21/19  4:02 AM   Result Value Ref Range    Hemoglobin A1c 5.8 4.8 - 6.0 %    Est. average glucose 120 mg/dL   CBC W/O DIFF    Collection Time: 04/21/19  4:02 AM   Result Value Ref Range    WBC 8.0 4.3 - 11.1 K/uL    RBC 4.71 4.05 - 5.2 M/uL    HGB 13.5 11.7 - 15.4 g/dL    HCT 41.4 35.8 - 46.3 %    MCV 87.9 79.6 - 97.8 FL    MCH 28.7 26.1 - 32.9 PG    MCHC 32.6 31.4 - 35.0 g/dL    RDW 12.2 11.9 - 14.6 %    PLATELET 996 911 - 098 K/uL    MPV 10.2 9.4 - 12.3 FL    ABSOLUTE NRBC 0.00 0.0 - 0.2 K/uL   TSH 3RD GENERATION    Collection Time: 04/21/19  4:02 AM   Result Value Ref Range    TSH 1.140 0.358 - 3.740 uIU/mL   MAGNESIUM    Collection Time: 04/21/19  4:02 AM   Result Value Ref Range    Magnesium 1.9 1.8 - 2.4 mg/dL   PHOSPHORUS    Collection Time: 04/21/19  4:02 AM   Result Value Ref Range    Phosphorus 3.6 2.5 - 4.5 MG/DL   TROPONIN I    Collection Time: 04/21/19  4:02 AM   Result Value Ref Range    Troponin-I, Qt. <0.02 (L) 0.02 - 0.05 NG/ML   TROPONIN I    Collection Time: 04/21/19 11:45 AM   Result Value Ref Range    Troponin-I, Qt. <0.02 (L) 0.02 - 0.05 NG/ML       Discharge Exam:  Patient Vitals for the past 24 hrs:   Temp Pulse Resp BP SpO2   04/21/19 1200 97.8 °F (36.6 °C) 84 17 129/87 95 %   04/21/19 0800 98.3 °F (36.8 °C) 80 17 118/77 96 %   04/21/19 0516 97.4 °F (36.3 °C) 78 18 104/71 93 %   04/21/19 0038 98.4 °F (36.9 °C) 92 18 128/89 95 %   04/20/19 2218 98.2 °F (36.8 °C) 78 19 132/88 96 %   04/20/19 2117   20     04/20/19 2116  77  146/82    04/20/19 2059  79  164/87 99 %   04/20/19 2020  80  153/57 99 %   04/20/19 2016 98.3 °F (36.8 °C) 82 20 (!) 156/101 100 %     Oxygen Therapy  O2 Sat (%): 95 % (04/21/19 1200)  O2 Device: Room air (04/20/19 2059)  No intake or output data in the 24 hours ending 04/21/19 1302    General:    Well nourished. Alert. No distress. Eyes:   Normal sclera. Extraocular movements intact. ENT:  Normocephalic, atraumatic. Moist mucous membranes  CV:   Regular rate and rhythm. No murmur, rub, or gallop. Lungs:  Clear to auscultation bilaterally. No wheezing, rhonchi, or rales. Abdomen: Soft, nontender, nondistended. Bowel sounds normal.   Extremities: Warm and dry. No cyanosis or edema. Neurologic: CN II-XII grossly intact. Sensation intact. Skin:     No rashes or jaundice. Psych:  Normal mood and affect. Discharge Info:   Current Discharge Medication List      START taking these medications    Details   aspirin 81 mg chewable tablet Take 1 Tab by mouth daily for 30 days. Qty: 30 Tab, Refills: 0      pravastatin (PRAVACHOL) 40 mg tablet Take 1 Tab by mouth nightly for 30 days.   Qty: 30 Tab, Refills: 0               Disposition: home    Activity: Activity as tolerated  Diet: DIET REGULAR    Follow-up Appointments   Procedures    FOLLOW UP VISIT Appointment in: 3 - 5 Days     Standing Status:   Standing     Number of Occurrences:   1     Order Specific Question:   Appointment in     Answer:   3 - 5 Days         Follow-up Information     Follow up With Specialties Details Why Contact Info    None   follow up post hospitalization None ([phone]) Patient stated that they have no PCP      speech therapy  In 3 days post hospitalization follow up - they will call you with appointment     Delia Moreno, DO Neurology In 2 weeks  [address]  [phone]            Time spent in patient discharge planning and coordination 35 minutes.   Discharge plan discussed with  [address],   Signed:  Jenna Webb NP

## 2019-04-21 NOTE — DISCHARGE INSTRUCTIONS
Follow up with primary care provider and discuss the events of this admission    Follow up with speech therapy as OP    Take medication as prescribed. Stop smoking and  taking recreational drugs. .Patient Education        Learning About Dysarthria  What is dysarthria? When people have dysarthria (say \"dis-AR-three-uh\"), they cannot speak well. They understand language. They know what they want to say. And they usually don't have trouble reading and writing. But when they talk, their speech is often slurred and hard to understand. This can be caused by an injury to the brain or a disease of the nervous system. It can affect the nerves that control the lips, jaw, tongue, and soft palate. Some causes include a stroke, Parkinson's disease, myasthenia gravis, or injury to the head. If you have dysarthria, you and your family may feel frustrated and anxious. But speech therapy can help improve your speech so that others can understand you better. What are the symptoms? The symptoms of dysarthria depend on what caused it. People with dysarthria may:  · Slur their speech. · Have a hard time saying consonants with enough force to be understood. · Speak too fast or too slow. · Mumble and run their words together. · Be able to say only a few words per breath. · Speak too softly. · Have a hard time sucking, chewing, and swallowing. How is it diagnosed? Your doctor will do a physical exam and ask questions about your medical history. If it looks like you have a speech problem, the doctor may refer you to a speech-language pathologist (SLP). Your doctor or SLP may suggest other tests to:  · Find out if you have had a stroke, an injury to your brain, or a disease of your nervous system. · Look for a swallowing problem. Sometimes this is done with a swallowing test that uses X-rays. The SLP will also listen to you talk. He or she will watch how you say sounds and combinations of sounds.  The SLP will also listen to how you pause between phrases, how you put stress on parts of words, and how loudly you speak. How is it treated? If a treatable medical condition is causing your speech problem, your doctor will likely start by treating that condition. This may also improve your speech. If your speech problem can't be solved by treating a medical condition, then there are things your doctor or speech-language pathologist can do to help improve your speech. He or she may give you:  · Methods to help make your speech sounds clearer. · Strategies to help you communicate better. · A computer or other device, if needed, to help you communicate. · A palatal lift. This is a device that looks like a retainer. It supports the soft palate. Your health care team will help you decide on the best schedule for treatment. What are some tips for coping? Communication problems can be very frustrating. Here are some tips: For you  · Try to exaggerate the sounds of each word. · Be patient with others. If they have trouble understanding you, try again. · Use other methods to help listeners understand you. For example, you can use an alphabet board. Point to the first letter of each word as you say it. For family and friends  · Be patient, understanding, and supportive. · Speak directly to your loved one. Keep eye contact. · Give your loved one enough time to talk. · Talk with the person in a quiet place without distractions like radio or TV. · Don't correct the person's pronunciation. · Ask the person to repeat something if you don't understand. · Now and then, repeat back what the person said. This helps confirm that you understand the message. · Limit conversations to small groups or one-on-one talks. Large group conversations may be hard for your loved one to follow. Follow-up care is a key part of your treatment and safety. Be sure to make and go to all appointments, and call your doctor if you are having problems. It's also a good idea to know your test results and keep a list of the medicines you take. Where can you learn more? Go to http://sheri-dennise.info/. Enter P901 in the search box to learn more about \"Learning About Dysarthria. \"  Current as of: Garima 3, 2018  Content Version: 11.9  © 0750-7725 [employer]. Care instructions adapted under license by [employer] (which disclaims liability or warranty for this information). If you have questions about a medical condition or this instruction, always ask your healthcare professional. [place] [address] any warranty or liability for your use of this information. DISCHARGE SUMMARY from Nurse    PATIENT INSTRUCTIONS:    After general anesthesia or intravenous sedation, for 24 hours or while taking prescription Narcotics:  · Limit your activities  · Do not drive and operate hazardous machinery  · Do not make important personal or business decisions  · Do  not drink alcoholic beverages  · If you have not urinated within 8 hours after discharge, please contact your surgeon on call. Report the following to your surgeon:  · Excessive pain, swelling, redness or odor of or around the surgical area  · Temperature over 100.5  · Nausea and vomiting lasting longer than 4 hours or if unable to take medications  · Any signs of decreased circulation or nerve impairment to extremity: change in color, persistent  numbness, tingling, coldness or increase pain  · Any questions    What to do at Home:  Recommended activity: Activity as tolerated,     If you experience any of the following symptoms fever greater then 100.5, pain unrelieved by medication, increase in shortness of breath, please follow up with primary care doctor. *  Please give a list of your current medications to your Primary Care Provider.     *  Please update this list whenever your medications are discontinued, doses are      changed, or new medications (including over-the-counter products) are added. *  Please carry medication information at all times in case of emergency situations. These are general instructions for a healthy lifestyle:    No smoking/ No tobacco products/ Avoid exposure to second hand smoke  Surgeon General's Warning:  Quitting smoking now greatly reduces serious risk to your health. Obesity, smoking, and sedentary lifestyle greatly increases your risk for illness    A healthy diet, regular physical exercise & weight monitoring are important for maintaining a healthy lifestyle    You may be retaining fluid if you have a history of heart failure or if you experience any of the following symptoms:  Weight gain of 3 pounds or more overnight or 5 pounds in a week, increased swelling in our hands or feet or shortness of breath while lying flat in bed. Please call your doctor as soon as you notice any of these symptoms; do not wait until your next office visit. Recognize signs and symptoms of STROKE:    F-face looks uneven    A-arms unable to move or move unevenly    S-speech slurred or non-existent    T-time-call 911 as soon as signs and symptoms begin-DO NOT go       Back to bed or wait to see if you get better-TIME IS BRAIN. Warning Signs of HEART ATTACK     Call 911 if you have these symptoms:   Chest discomfort. Most heart attacks involve discomfort in the center of the chest that lasts more than a few minutes, or that goes away and comes back. It can feel like uncomfortable pressure, squeezing, fullness, or pain.  Discomfort in other areas of the upper body. Symptoms can include pain or discomfort in one or both arms, the back, neck, jaw, or stomach.  Shortness of breath with or without chest discomfort.  Other signs may include breaking out in a cold sweat, nausea, or lightheadedness. Don't wait more than five minutes to call 911 - MINUTES MATTER! Fast action can save your life.  Calling 911 is almost always the fastest way to get lifesaving treatment. Emergency Medical Services staff can begin treatment when they arrive -- up to an hour sooner than if someone gets to the hospital by car. The discharge information has been reviewed with the patient. The patient verbalized understanding. Discharge medications reviewed with the patient and appropriate educational materials and side effects teaching were provided.   ___________________________________________________________________________________________________________________________________
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
## 2019-04-21 NOTE — PROGRESS NOTES
Hospitalist Progress Note Admit Date:  2019  8:23 PM  
Name:  Tracy Galeana Age:  29 y.o. 
:  1985 MRN:  [id number] PCP:  None Treatment Team: Attending Provider: Leslie Phoenix MD; Occupational Therapist: ANDERS Beavers; Consulting Provider: Kelsey Awad DO Subjective: HPI- Patient is a 28 yo  CF with a PMH of smoking who presented to the ER with reports of  
persistent substernal chest pain and heaviness with associated nausea and vomiting x 3 days and 5-6 hours of acute onset dysarthria (stuttering). Troponin negative. CT negative. MRI negative. Carotids and echo pending. ST pending. UDS with amphetamines and Opiates. Subjective 
-patient drowsy awakened to voice. Patient disheveled. Reports HA. Dysarthria (stuttering) continues. Patient reports left sided weakness. Echo and carotids outstanding. Speech eval pending. Will consult neurology. Objective:  
 
Patient Vitals for the past 24 hrs: 
 Temp Pulse Resp BP SpO2  
19 0800 98.3 °F (36.8 °C) 80 17 118/77 96 % 19 0516 97.4 °F (36.3 °C) 78 18 104/71 93 % 19 0038 98.4 °F (36.9 °C) 92 18 128/89 95 % 19 2218 98.2 °F (36.8 °C) 78 19 132/88 96 % 197   20    
19  77  146/82   
19  79  164/87 99 % 19  80  153/57 99 % 19 98.3 °F (36.8 °C) 82 20 (!) 156/101 100 % Oxygen Therapy O2 Sat (%): 96 % (19 0800) O2 Device: Room air (19) No intake or output data in the 24 hours ending 19 1140 General:    Well nourished. Alert. Disheveled CV:   RRR. No murmur, rub, or gallop. Lungs:   CTAB. No wheezing, rhonchi, or rales. Abdomen:   Soft, nontender, nondistended. Extremities: Warm and dry. No cyanosis or edema. Skin:     No rashes or jaundice. Neuro:             Dyarthria, left side weakness noted Data Review: I have reviewed all labs, meds, telemetry events, and studies from the last 24 hours. Recent Results (from the past 24 hour(s)) EKG, 12 LEAD, INITIAL Collection Time: 04/20/19  8:07 PM  
Result Value Ref Range Ventricular Rate 76 BPM  
 Atrial Rate 76 BPM  
 P-R Interval 120 ms QRS Duration 90 ms Q-T Interval 390 ms QTC Calculation (Bezet) 438 ms Calculated P Axis -44 degrees Calculated R Axis -36 degrees Calculated T Axis 47 degrees Diagnosis Unusual P axis, possible ectopic atrial rhythm Left axis deviation Abnormal ECG No previous ECGs available Confirmed by ST BRANDON LOVE MD (), EBENEZER CARLSON ([id number]) on 4/21/2019 8:54:31 AM 
  
CBC WITH AUTOMATED DIFF Collection Time: 04/20/19  8:23 PM  
Result Value Ref Range WBC 11.5 (H) 4.3 - 11.1 K/uL  
 RBC 4.87 4.05 - 5.2 M/uL  
 HGB 14.0 11.7 - 15.4 g/dL HCT 42.5 35.8 - 46.3 % MCV 87.3 79.6 - 97.8 FL  
 MCH 28.7 26.1 - 32.9 PG  
 MCHC 32.9 31.4 - 35.0 g/dL  
 RDW 12.2 11.9 - 14.6 % PLATELET 839 091 - 486 K/uL MPV 10.2 9.4 - 12.3 FL ABSOLUTE NRBC 0.00 0.0 - 0.2 K/uL  
 DF AUTOMATED NEUTROPHILS 58 43 - 78 % LYMPHOCYTES 35 13 - 44 % MONOCYTES 5 4.0 - 12.0 % EOSINOPHILS 1 0.5 - 7.8 % BASOPHILS 0 0.0 - 2.0 % IMMATURE GRANULOCYTES 0 0.0 - 5.0 %  
 ABS. NEUTROPHILS 6.7 1.7 - 8.2 K/UL  
 ABS. LYMPHOCYTES 4.1 0.5 - 4.6 K/UL  
 ABS. MONOCYTES 0.5 0.1 - 1.3 K/UL  
 ABS. EOSINOPHILS 0.1 0.0 - 0.8 K/UL  
 ABS. BASOPHILS 0.0 0.0 - 0.2 K/UL  
 ABS. IMM. GRANS. 0.0 0.0 - 0.5 K/UL METABOLIC PANEL, COMPREHENSIVE Collection Time: 04/20/19  8:23 PM  
Result Value Ref Range Sodium 140 136 - 145 mmol/L Potassium 3.6 3.5 - 5.1 mmol/L Chloride 102 98 - 107 mmol/L  
 CO2 31 21 - 32 mmol/L Anion gap 7 7 - 16 mmol/L Glucose 93 65 - 100 mg/dL BUN 9 6 - 23 MG/DL Creatinine 0.75 0.6 - 1.0 MG/DL  
 GFR est AA >60 >60 ml/min/1.73m2 GFR est non-AA >60 >60 ml/min/1.73m2 Calcium 8.9 8.3 - 10.4 MG/DL Bilirubin, total 0.6 0.2 - 1.1 MG/DL  
 ALT (SGPT) 19 12 - 65 U/L  
 AST (SGOT) 17 15 - 37 U/L Alk. phosphatase 94 50 - 136 U/L Protein, total 6.9 6.3 - 8.2 g/dL Albumin 4.0 3.5 - 5.0 g/dL Globulin 2.9 2.3 - 3.5 g/dL A-G Ratio 1.4 1.2 - 3.5 ETHYL ALCOHOL Collection Time: 04/20/19  8:23 PM  
Result Value Ref Range ALCOHOL(ETHYL),SERUM <3 MG/DL  
D DIMER Collection Time: 04/20/19  8:23 PM  
Result Value Ref Range D DIMER <0.27 <0.56 ug/ml(FEU) GLUCOSE, POC Collection Time: 04/20/19  8:28 PM  
Result Value Ref Range Glucose (POC) 99 65 - 100 mg/dL POC TROPONIN-I Collection Time: 04/20/19  8:28 PM  
Result Value Ref Range Troponin-I (POC) 0 (L) 0.02 - 0.05 ng/ml POC PT/INR Collection Time: 04/20/19  8:32 PM  
Result Value Ref Range Prothrombin time (POC) 12.4 (H) 9.6 - 11.6 SECS  
 INR (POC) 1.0 0.9 - 1.2 HCG URINE, QL Collection Time: 04/20/19 10:12 PM  
Result Value Ref Range HCG urine, QL NEGATIVE  NEG    
URINALYSIS W/ RFLX MICROSCOPIC Collection Time: 04/20/19 10:12 PM  
Result Value Ref Range Color YELLOW Appearance CLOUDY Specific gravity 1.015 1.001 - 1.023    
 pH (UA) 6.0 5.0 - 9.0 Protein NEGATIVE  NEG mg/dL Glucose NEGATIVE  mg/dL Ketone NEGATIVE  NEG mg/dL Bilirubin NEGATIVE  NEG Blood NEGATIVE  NEG Urobilinogen 0.2 0.2 - 1.0 EU/dL Nitrites NEGATIVE  NEG Leukocyte Esterase NEGATIVE  NEG    
DRUG SCREEN, URINE Collection Time: 04/20/19 10:13 PM  
Result Value Ref Range PCP(PHENCYCLIDINE) NEGATIVE BENZODIAZEPINES NEGATIVE     
 COCAINE NEGATIVE AMPHETAMINES POSITIVE METHADONE NEGATIVE     
 THC (TH-CANNABINOL) NEGATIVE     
 OPIATES POSITIVE    
 BARBITURATES NEGATIVE     
LIPID PANEL Collection Time: 04/21/19  4:02 AM  
Result Value Ref Range LIPID PROFILE  Cholesterol, total 179 <200 MG/DL  
 Triglyceride 232 (H) 35 - 150 MG/DL  
 HDL Cholesterol 31 (L) 40 - 60 MG/DL  
 LDL, calculated 101.6 (H) <100 MG/DL VLDL, calculated 46.4 (H) 6.0 - 23.0 MG/DL  
 CHOL/HDL Ratio 5.8 HEMOGLOBIN A1C WITH EAG Collection Time: 04/21/19  4:02 AM  
Result Value Ref Range Hemoglobin A1c 5.8 4.8 - 6.0 % Est. average glucose 120 mg/dL CBC W/O DIFF Collection Time: 04/21/19  4:02 AM  
Result Value Ref Range WBC 8.0 4.3 - 11.1 K/uL  
 RBC 4.71 4.05 - 5.2 M/uL  
 HGB 13.5 11.7 - 15.4 g/dL HCT 41.4 35.8 - 46.3 % MCV 87.9 79.6 - 97.8 FL  
 MCH 28.7 26.1 - 32.9 PG  
 MCHC 32.6 31.4 - 35.0 g/dL  
 RDW 12.2 11.9 - 14.6 % PLATELET 810 165 - 318 K/uL MPV 10.2 9.4 - 12.3 FL ABSOLUTE NRBC 0.00 0.0 - 0.2 K/uL  
TSH 3RD GENERATION Collection Time: 04/21/19  4:02 AM  
Result Value Ref Range TSH 1.140 0.358 - 3.740 uIU/mL MAGNESIUM Collection Time: 04/21/19  4:02 AM  
Result Value Ref Range Magnesium 1.9 1.8 - 2.4 mg/dL PHOSPHORUS Collection Time: 04/21/19  4:02 AM  
Result Value Ref Range Phosphorus 3.6 2.5 - 4.5 MG/DL  
TROPONIN I Collection Time: 04/21/19  4:02 AM  
Result Value Ref Range Troponin-I, Qt. <0.02 (L) 0.02 - 0.05 NG/ML All Micro Results None Current Meds: 
Current Facility-Administered Medications Medication Dose Route Frequency  sodium chloride (NS) flush 5-40 mL  5-40 mL IntraVENous Q8H  
 sodium chloride (NS) flush 5-40 mL  5-40 mL IntraVENous PRN  
 0.9% sodium chloride infusion  125 mL/hr IntraVENous CONTINUOUS  
 ondansetron (ZOFRAN) injection 4 mg  4 mg IntraVENous Q6H PRN  
 aspirin chewable tablet 81 mg  81 mg Oral DAILY  pravastatin (PRAVACHOL) tablet 40 mg  40 mg Oral QHS  enoxaparin (LOVENOX) injection 40 mg  40 mg SubCUTAneous Q24H  
 nicotine (NICODERM CQ) 21 mg/24 hr patch 1 Patch  1 Patch TransDERmal DAILY Other Studies (last 24 hours): 
Mri Brain W Wo Cont Result Date: 4/21/2019 MRI BRAIN WITHOUT and WITH CONTRAST, 4/21/2019 History: Dysarthria for 5 to 6 hours associated with nausea, vomiting, and chest pain/heaviness. Comparison:  CT head without contras 4/21/2019t Technique:  Sagittal T1, axial T1, T2, T2 FLAIR, T2 gradient recalled echo, diffusion with ADC map were followed by 15 cc intravenous Dotarem after which axial, sagittal and coronal T1 images were repeated. Findings: Diffusion-weighted images show no evidence of acute infarction. No abnormal signal changes are seen on gradient recalled echo images, T1-weighted images, or T2 FLAIR images to suggest potential acute hemorrhage. No evidence for acute hydrocephalus. No abnormal extra axial fluid collections are seen. Orbits show no gross abnormality. No inflammatory changes are noted the paranasal sinuses, middle ears and mastoid air cells. Following the administration of intravenous contrast, no enhancing mass lesion or worrisome pattern of enhancement is seen. Normal enhancement is seen of the dural venous sinuses, cavernous sinus, and central intracranial arteries. IMPRESSION: 1. No acute or worrisome findings evident on today's pre and postcontrast study. Ct Head Wo Cont Result Date: 4/20/2019 CT HEAD WITHOUT CONTRAST HISTORY:  CVA. COMPARISON: None TECHNIQUE: Axial imaging was performed without intravenous contrast utilizing 5mm slice thickness. Sagittal and coronal reformats were performed. Radiation dose reduction techniques were used for this study. Our CT scanner uses one or all of the following: Automated exposure control, adjustment of the MAS or KUB according to patient's size and iterative reconstruction. FINDINGS:    *BRAIN:    -  There are no early signs of territorial or lacunar infarction by CT.    -  No intracranial mass, hematoma, or hydrocephalus.    -  No gross white matter abnormality by CT. *VISUALIZED PARANASAL SINUSES: Well aerated. *MASTOIDS:  Clear. *CALVARIUM AND SCALP: Unremarkable. IMPRESSION: Unremarkable brain CT without intravenous contrast. Date of Dictation: 4/20/2019 8:29 PM  
 
Xr Chest [facility] Result Date: 4/20/2019 EXAM: XR CHEST PORT INDICATION: CP COMPARISON: None. FINDINGS: A portable AP radiograph of the chest was obtained at 2032 hours. The patient is on a cardiac monitor. The lungs are clear. The cardiac and mediastinal contours and pulmonary vascularity are normal.  The bones and soft tissues are grossly within normal limits. IMPRESSION: Normal chest. 
 
 
Assessment and Plan:  
 
Hospital Problems as of 4/21/2019 Never Reviewed Codes Class Noted - Resolved POA * (Principal) Dysarthria ICD-10-CM: R47.1 ICD-9-CM: 784.51  4/20/2019 - Present Yes Leukocytosis ICD-10-CM: T85.054 ICD-9-CM: 288.60  4/20/2019 - Present Yes Acute chest pain ICD-10-CM: R07.9 ICD-9-CM: 786.50  4/20/2019 - Present Yes Smoking greater than 20 pack years ICD-10-CM: F17.210 ICD-9-CM: 305.1  4/20/2019 - Present Yes Nausea and vomiting ICD-10-CM: R11.2 ICD-9-CM: 787.01  4/20/2019 - Present Yes PLAN:   
Dysarthria (4/20/2019) - I suspect this is due to stress reaction, but cannot rule out acute CVA or intracranial abnormality 
- Place on remote tele - Check MRI- negative - Start ASA + Statin 
- PT/OT/SLP 
- neurology consult Ct and MRI negative- symptoms persist  
-echo and carotids outstanding 
-remains npo until speech eval  
  
Active Problems: 
  Acute chest pain (4/20/2019) - I suspect this is due to stress and anxiety 
- Initial troponin normal 
- 12 lead EKG normal 
- Check troponin in 6 hours - Place on remote tele 
  
  Leukocytosis (4/20/2019)- resolved - Likely reactive - Start normal saline - Repeat CBC in AM 
  
  Nausea and vomiting (4/20/2019)- resolving - I suspect this is due to stress and anxiety 
- Start Zofran - No recent sick contacts 
  
  Smoking greater than 20 pack years (4/20/2019) - Patient refuses nicotine patch 
- States she doesn't want to be admitted due to wanting to smoke so high risk for leaving AMA 
  
Code Status: FULL CODE 
DVT Prophylaxis: Lovenox Plan of care discussed with Dr. Dipti Jenkins,  
 
Signed: 
Melany Bashir NP-C

## 2019-04-21 NOTE — PROGRESS NOTES
Consult Patient: Gerhardt Daunt MRN: [id number] YOB: 1985  Age: 29 y.o. Sex: female Subjective:  
  
Gerhardt Daunt is a 29 y.o. female who is being seen for Stuttering speech. Interval history: The patient continues to have stuttering speech. No focal weakness. Continues to have some atypical chest pain. Social History Tobacco Use  Smoking status: Current Every Day Smoker Packs/day: 1.00 Substance Use Topics  Alcohol use: No  
  
Current Facility-Administered Medications Medication Dose Route Frequency Provider Last Rate Last Dose  sodium chloride (NS) flush 5-40 mL  5-40 mL IntraVENous Q8H Bent, [place] E, DO   5 mL at 04/20/19 2223  sodium chloride (NS) flush 5-40 mL  5-40 mL IntraVENous PRN PrimitivoIsac gautamson E, DO      
 0.9% sodium chloride infusion  125 mL/hr IntraVENous CONTINUOUS Julio Silence E,  mL/hr at 04/21/19 0838 125 mL/hr at 04/21/19 9514  ondansetron (ZOFRAN) injection 4 mg  4 mg IntraVENous Q6H PRN Isac Langfordson E, DO      
 aspirin chewable tablet 81 mg  81 mg Oral DAILY BentIsac gautamson E, DO      
 pravastatin (PRAVACHOL) tablet 40 mg  40 mg Oral QHS Primitivo, [place] E, DO   Stopped at 04/20/19 2300  enoxaparin (LOVENOX) injection 40 mg  40 mg SubCUTAneous Q24H BentIsac gautamson E, DO   40 mg at 04/20/19 2224  
 nicotine (NICODERM CQ) 21 mg/24 hr patch 1 Patch  1 Patch TransDERmal DAILY Jono Langford E, DO   1 Patch at 04/20/19 2354 No Known Allergies Review of Systems: No fevers, chills,  The patient does endorse generalized weakness. She endorses stuttering speech. She does endorse some stress recently. Otherwise, see above Objective:  
 
Vitals:  
 04/20/19 2218 04/21/19 0038 04/21/19 0516 04/21/19 0800 BP: 132/88 128/89 104/71 118/77 Pulse: 78 92 78 80 Resp: 19 18 18 17 Temp: 98.2 °F (36.8 °C) 98.4 °F (36.9 °C) 97.4 °F (36.3 °C) 98.3 °F (36.8 °C) SpO2: 96% 95% 93% 96% Weight:      
  
 
Physical Exam: 
General - Well developed, well nourished, in no apparent distress. Pleasant. Appears somewhat anxious with stuttering speech HEENT - Normocephalic, atraumatic. Conjunctiva, tympanic membranes, and oropharynx are clear. Neck - Supple without masses, no bruits Cardiovascular - Regular rate and rhythm. Normal S1, S2 without murmurs, rubs, or gallops. Lungs - Clear to auscultation. Abdomen - Soft, nontender with normal bowel sounds. Extremities - Peripheral pulses intact. No edema and no rashes. Neurological examination - Comprehension, attention , memory and reasoning are intact. Stuttering speech. No dysarthria. No deficits in receptive language. On cranial nerve examination pupils are equal round and reactive to light. Fundoscopic examination is normal. Visual acuity is adequate. Visual fields are full to finger confrontation. Extraocular motility is normal. Face is symmetric and sensation is intact to light touch. Hearing is intact to finger rustle bilaterally. Motor examination - There is normal muscle tone and bulk. Power testing is 5 out of 5 throughout with prominent giveaway weakness in all 4 limbs. Lab Results Component Value Date/Time Cholesterol, total 179 04/21/2019 04:02 AM  
 HDL Cholesterol 31 (L) 04/21/2019 04:02 AM  
 LDL, calculated 101.6 (H) 04/21/2019 04:02 AM  
 VLDL, calculated 46.4 (H) 04/21/2019 04:02 AM  
 Triglyceride 232 (H) 04/21/2019 04:02 AM  
 CHOL/HDL Ratio 5.8 04/21/2019 04:02 AM  
  
 
Lab Results Component Value Date/Time Hemoglobin A1c 5.8 04/21/2019 04:02 AM  
  
 
CT Results (most recent): Personally Reviewed Results from Hospital Encounter encounter on 04/20/19 CT HEAD WO CONT Narrative CT HEAD WITHOUT CONTRAST HISTORY:  CVA. COMPARISON: None TECHNIQUE: Axial imaging was performed without intravenous contrast utilizing 5mm slice thickness. Sagittal and coronal reformats were performed. Radiation 
dose reduction techniques were used for this study. Our CT scanner uses one or 
all of the following: Automated exposure control, adjustment of the MAS or KUB according to patient's 
size and iterative reconstruction. FINDINGS:     
 
*BRAIN:  
   -  There are no early signs of territorial or lacunar infarction by CT. 
   -  No intracranial mass, hematoma, or hydrocephalus.  
   -  No gross white matter abnormality by CT. 
 
*VISUALIZED PARANASAL SINUSES: Well aerated. *MASTOIDS:  Clear. *CALVARIUM AND SCALP: Unremarkable. Impression IMPRESSION: 
 
Unremarkable brain CT without intravenous contrast. 
 
Date of Dictation: 4/20/2019 8:29 PM 
  
 
 
Results for orders placed or performed during the hospital encounter of 04/20/19 EKG, 12 LEAD, INITIAL Result Value Ref Range Ventricular Rate 76 BPM  
 Atrial Rate 76 BPM  
 P-R Interval 120 ms QRS Duration 90 ms Q-T Interval 390 ms QTC Calculation (Bezet) 438 ms Calculated P Axis -44 degrees Calculated R Axis -36 degrees Calculated T Axis 47 degrees Diagnosis Unusual P axis, possible ectopic atrial rhythm Left axis deviation Abnormal ECG No previous ECGs available Confirmed by ST BRANDON LOVE MD (), EBENEZER CARLSON ([id number]) on 4/21/2019 8:54:31 AM 
  
  
 
MRI Results (most recent): Personally Reviewed Results from Hospital Encounter encounter on 04/20/19 MRI BRAIN W WO CONT Narrative MRI BRAIN WITHOUT and WITH CONTRAST, 4/21/2019 History: Dysarthria for 5 to 6 hours associated with nausea, vomiting, and chest 
pain/heaviness. Comparison:  CT head without contras 4/21/2019t Technique:  Sagittal T1, axial T1, T2, T2 FLAIR, T2 gradient recalled echo, 
diffusion with ADC map were followed by 15 cc intravenous Dotarem after which 
axial, sagittal and coronal T1 images were repeated. Findings: Diffusion-weighted images show no evidence of acute infarction. No 
abnormal signal changes are seen on gradient recalled echo images, T1-weighted 
images, or T2 FLAIR images to suggest potential acute hemorrhage. No evidence 
for acute hydrocephalus. No abnormal extra axial fluid collections are seen. Orbits show no gross abnormality. No inflammatory changes are noted the 
paranasal sinuses, middle ears and mastoid air cells. Following the administration of intravenous contrast, no enhancing mass lesion 
or worrisome pattern of enhancement is seen. Normal enhancement is seen of the 
dural venous sinuses, cavernous sinus, and central intracranial arteries. Impression IMPRESSION:  
1. No acute or worrisome findings evident on today's pre and postcontrast 
study. Most recent Echo No results found for this visit on 04/20/19. Assessment:  
 
Functional neurological disorder: Her stuttering speech and giveaway weakness makes the diagnosis of a functional neurological disorder. No further workup is necessary. The patient should be seen by speech therapy. She should continue to see speech therapy as an outpatient until her symptoms improve. No contraindications to eating. From a neurological standpoint, no need for hospitalization. Plan: No further workup necessary Signed By: Doreen Gonzales DO April 21, 2019 I spent 25 minutes with this patient, over 50% of that time was counseling the patient on his or her medical condition/neurological condition.

## 2019-04-21 NOTE — ED NOTES
Pt has had no urine output since arrival to ED approx 1.5 hours ago, Rose Mary Jenkins receiving RN notified.

## 2019-04-21 NOTE — ED NOTES
TRANSFER - OUT REPORT: 
 
Verbal report given to Jerie Lefort, RN on Susana Chan  being transferred to  for routine progression of care Report consisted of patients Situation, Background, Assessment and  
Recommendations(SBAR). Information from the following report(s) SBAR was reviewed with the receiving nurse. Lines:  
Peripheral IV 04/20/19 Left Antecubital (Active) Site Assessment Clean, dry, & intact 4/20/2019  8:50 PM  
Phlebitis Assessment 0 4/20/2019  8:50 PM  
Infiltration Assessment 0 4/20/2019  8:50 PM  
Dressing Status Clean, dry, & intact 4/20/2019  8:50 PM  
Dressing Type Transparent 4/20/2019  8:50 PM  
Hub Color/Line Status Pink;Capped;Flushed;Patent 4/20/2019  8:50 PM  
Action Taken Blood drawn 4/20/2019  8:50 PM  
  
 
Opportunity for questions and clarification was provided. Patient transported with: 
 [employer]

## 2019-04-21 NOTE — H&P
HOSPITALIST H&P 
 
NAME:  Fahad Mcmahon Age:  29 y.o. 
:   1985 MRN:   [id number] PCP: None Attending MD: [employer], DO Treatment Team: Attending Provider: Rigoberto Delgado MD 
 
HPI:  
 
Fahad Mcmahon is a 29year old CF with a PMH of smoking who presented to the ER with 3 days of persistent substernal chest pain and heaviness with associated nausea and vomiting and 5-6 hours of acute onset dysarthria (stuttering). Per the patient and her mother-in-law, at bedside, the patient started complaining of chest pain and N/V 3 days prior to presentation. She denies radiation. Then on the day of presentation she acutely started having stuttering so she was brought to the ER. She denies F/C/N/V. Denies SOB. She is crying and anxious and is not wanting to be admitted because she wants to smoke. Complete ROS done and is as stated in HPI or otherwise negative PMH: Tobacco abuse PSH: No past surgeries None No Known Allergies Social History Tobacco Use  Smoking status: Current Every Day Smoker Packs/day: 1.00 Substance Use Topics  Alcohol use: No  
  
 
History reviewed. No pertinent family history. Objective:  
 
 
Visit Vitals /82 Pulse 77 Temp 98.3 °F (36.8 °C) Resp 20 SpO2 99% Temp (24hrs), Av.3 °F (36.8 °C), Min:98.3 °F (36.8 °C), Max:98.3 °F (36.8 °C) Oxygen Therapy O2 Sat (%): 99 % (19) O2 Device: Room air (19) Physical Exam: 
 
General:    Alert, cooperative, crying and anxious in moderate distress, appears stated age. Eyes:   PERRLA EOMI Anicteric Head:   Normocephalic, without obvious abnormality, atraumatic. ENT:  Nares normal. No drainage. Lungs:   Clear to auscultation bilaterally. No Wheezing or Rhonchi. No rales. Heart:   Regular rate and rhythm,  no murmur, rub or gallop. No JVD. Chest wall tender to palpation Abdomen:   Soft, non-tender. Not distended. Bowel sounds normal.  
MSK:  No edema. No clubbing or cyanosis. No deformities/lesions. Chest wall tender to palpation Skin:     Texture, turgor normal. No rashes or lesions. No Jaundice. Neurologic: Alert and oriented x 3, no focal deficits, stuttering Psychiatry:      No depression/anxiety. Mood congruent for context. Heme/Lymph/Immune: No echymoses or overt signs of bleeding. Lab/Data Review: 
Recent Results (from the past 24 hour(s)) CBC WITH AUTOMATED DIFF Collection Time: 04/20/19  8:23 PM  
Result Value Ref Range WBC 11.5 (H) 4.3 - 11.1 K/uL  
 RBC 4.87 4.05 - 5.2 M/uL  
 HGB 14.0 11.7 - 15.4 g/dL HCT 42.5 35.8 - 46.3 % MCV 87.3 79.6 - 97.8 FL  
 MCH 28.7 26.1 - 32.9 PG  
 MCHC 32.9 31.4 - 35.0 g/dL  
 RDW 12.2 11.9 - 14.6 % PLATELET 853 059 - 789 K/uL MPV 10.2 9.4 - 12.3 FL ABSOLUTE NRBC 0.00 0.0 - 0.2 K/uL  
 DF AUTOMATED NEUTROPHILS 58 43 - 78 % LYMPHOCYTES 35 13 - 44 % MONOCYTES 5 4.0 - 12.0 % EOSINOPHILS 1 0.5 - 7.8 % BASOPHILS 0 0.0 - 2.0 % IMMATURE GRANULOCYTES 0 0.0 - 5.0 %  
 ABS. NEUTROPHILS 6.7 1.7 - 8.2 K/UL  
 ABS. LYMPHOCYTES 4.1 0.5 - 4.6 K/UL  
 ABS. MONOCYTES 0.5 0.1 - 1.3 K/UL  
 ABS. EOSINOPHILS 0.1 0.0 - 0.8 K/UL  
 ABS. BASOPHILS 0.0 0.0 - 0.2 K/UL  
 ABS. IMM. GRANS. 0.0 0.0 - 0.5 K/UL METABOLIC PANEL, COMPREHENSIVE Collection Time: 04/20/19  8:23 PM  
Result Value Ref Range Sodium 140 136 - 145 mmol/L Potassium 3.6 3.5 - 5.1 mmol/L Chloride 102 98 - 107 mmol/L  
 CO2 31 21 - 32 mmol/L Anion gap 7 7 - 16 mmol/L Glucose 93 65 - 100 mg/dL BUN 9 6 - 23 MG/DL Creatinine 0.75 0.6 - 1.0 MG/DL  
 GFR est AA >60 >60 ml/min/1.73m2 GFR est non-AA >60 >60 ml/min/1.73m2 Calcium 8.9 8.3 - 10.4 MG/DL Bilirubin, total 0.6 0.2 - 1.1 MG/DL  
 ALT (SGPT) 19 12 - 65 U/L  
 AST (SGOT) 17 15 - 37 U/L Alk. phosphatase 94 50 - 136 U/L Protein, total 6.9 6.3 - 8.2 g/dL Albumin 4.0 3.5 - 5.0 g/dL Globulin 2.9 2.3 - 3.5 g/dL A-G Ratio 1.4 1.2 - 3.5 ETHYL ALCOHOL Collection Time: 04/20/19  8:23 PM  
Result Value Ref Range ALCOHOL(ETHYL),SERUM <3 MG/DL  
GLUCOSE, POC Collection Time: 04/20/19  8:28 PM  
Result Value Ref Range Glucose (POC) 99 65 - 100 mg/dL POC TROPONIN-I Collection Time: 04/20/19  8:28 PM  
Result Value Ref Range Troponin-I (POC) 0 (L) 0.02 - 0.05 ng/ml POC PT/INR Collection Time: 04/20/19  8:32 PM  
Result Value Ref Range Prothrombin time (POC) 12.4 (H) 9.6 - 11.6 SECS  
 INR (POC) 1.0 0.9 - 1.2 Imaging: 
Ct Head Wo Cont Result Date: 4/20/2019 IMPRESSION: Unremarkable brain CT without intravenous contrast. Date of Dictation: 4/20/2019 8:29 PM  
 
Xr Chest [facility] Result Date: 4/20/2019 IMPRESSION: Normal chest. 
 
 
Cultures: All Micro Results None Assessment/Plan:  
 
Principal Problem: 
  Dysarthria (4/20/2019) - I suspect this is due to stress reaction, but cannot rule out acute CVA or intracranial abnormality 
- Place on remote tele - Check MRI 
- Start ASA + Statin 
- PT/OT/SLP Active Problems: 
  Acute chest pain (4/20/2019) - I suspect this is due to stress and anxiety 
- Initial troponin normal 
- 12 lead EKG normal 
- Check troponin in 6 hours - Place on remote tele Leukocytosis (4/20/2019) - Likely reactive - Start normal saline - Repeat CBC in AM 
 
  Nausea and vomiting (4/20/2019) - I suspect this is due to stress and anxiety 
- Start Zofran - No recent sick contacts Smoking greater than 20 pack years (4/20/2019) - Patient refuses nicotine patch 
- States she doesn't want to be admitted due to wanting to smoke so high risk for leaving AMA Code Status: FULL CODE 
DVT Prophylaxis: Lovenox Anticipated discharge: 24-48 hours Shaheedela Kat, DO 
9:59 PM

## 2019-04-21 NOTE — PROGRESS NOTES
Speech Pathology Note: 
 
Patient off floor to MRI. Will follow up as schedule permits. Thank you for this consult. Zurdo Puente.  MS Micki, CCC-SLP

## 2019-08-07 ENCOUNTER — APPOINTMENT (OUTPATIENT)
Dept: CT IMAGING | Age: 34
End: 2019-08-07
Attending: EMERGENCY MEDICINE
Payer: MEDICAID

## 2019-08-07 ENCOUNTER — HOSPITAL ENCOUNTER (EMERGENCY)
Age: 34
Discharge: HOME | End: 2019-08-07
Attending: EMERGENCY MEDICINE
Payer: MEDICAID

## 2019-08-07 ENCOUNTER — APPOINTMENT (OUTPATIENT)
Dept: GENERAL RADIOLOGY | Age: 34
End: 2019-08-07
Attending: EMERGENCY MEDICINE
Payer: MEDICAID

## 2019-08-07 VITALS
HEART RATE: 70 BPM | TEMPERATURE: 97.9 F | DIASTOLIC BLOOD PRESSURE: 72 MMHG | OXYGEN SATURATION: 98 % | HEIGHT: 67 IN | SYSTOLIC BLOOD PRESSURE: 111 MMHG | WEIGHT: 148 LBS | RESPIRATION RATE: 18 BRPM | BODY MASS INDEX: 23.23 KG/M2

## 2019-08-07 DIAGNOSIS — F19.10 DRUG ABUSE (HCC): ICD-10-CM

## 2019-08-07 DIAGNOSIS — T07.XXXA MULTIPLE ABRASIONS: ICD-10-CM

## 2019-08-07 DIAGNOSIS — S06.300A: ICD-10-CM

## 2019-08-07 DIAGNOSIS — S02.109A: ICD-10-CM
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
## 2019-08-07 LAB
AMPHET UR QL SCN: POSITIVE
ANION GAP SERPL CALC-SCNC: 7 MMOL/L (ref 7–16)
BACTERIA URNS QL MICRO: ABNORMAL /HPF
BARBITURATES UR QL SCN: NEGATIVE
BASOPHILS # BLD: 0.1 K/UL (ref 0–0.2)
BASOPHILS NFR BLD: 1 % (ref 0–2)
BENZODIAZ UR QL: NEGATIVE
BUN SERPL-MCNC: 9 MG/DL (ref 6–23)
CALCIUM SERPL-MCNC: 8.7 MG/DL (ref 8.3–10.4)
CANNABINOIDS UR QL SCN: NEGATIVE
CASTS URNS QL MICRO: 0 /LPF
CHLORIDE SERPL-SCNC: 106 MMOL/L (ref 98–107)
CO2 SERPL-SCNC: 28 MMOL/L (ref 21–32)
COCAINE UR QL SCN: NEGATIVE
CREAT SERPL-MCNC: 0.8 MG/DL (ref 0.6–1)
CRYSTALS URNS QL MICRO: 0 /LPF
DIFFERENTIAL METHOD BLD: NORMAL
EOSINOPHIL # BLD: 0.2 K/UL (ref 0–0.8)
EOSINOPHIL NFR BLD: 2 % (ref 0.5–7.8)
EPI CELLS #/AREA URNS HPF: ABNORMAL /HPF
ERYTHROCYTE [DISTWIDTH] IN BLOOD BY AUTOMATED COUNT: 12.2 % (ref 11.9–14.6)
GLUCOSE SERPL-MCNC: 102 MG/DL (ref 65–100)
HCG UR QL: NEGATIVE
HCT VFR BLD AUTO: 41.5 % (ref 35.8–46.3)
HGB BLD-MCNC: 13.4 G/DL (ref 11.7–15.4)
IMM GRANULOCYTES # BLD AUTO: 0 K/UL (ref 0–0.5)
IMM GRANULOCYTES NFR BLD AUTO: 0 % (ref 0–5)
LYMPHOCYTES # BLD: 2.2 K/UL (ref 0.5–4.6)
LYMPHOCYTES NFR BLD: 26 % (ref 13–44)
MCH RBC QN AUTO: 28.8 PG (ref 26.1–32.9)
MCHC RBC AUTO-ENTMCNC: 32.3 G/DL (ref 31.4–35)
MCV RBC AUTO: 89.2 FL (ref 79.6–97.8)
METHADONE UR QL: NEGATIVE
MONOCYTES # BLD: 0.5 K/UL (ref 0.1–1.3)
MONOCYTES NFR BLD: 6 % (ref 4–12)
MUCOUS THREADS URNS QL MICRO: ABNORMAL /LPF
NEUTS SEG # BLD: 5.6 K/UL (ref 1.7–8.2)
NEUTS SEG NFR BLD: 66 % (ref 43–78)
NRBC # BLD: 0 K/UL (ref 0–0.2)
OPIATES UR QL: POSITIVE
PCP UR QL: NEGATIVE
PLATELET # BLD AUTO: 306 K/UL (ref 150–450)
PMV BLD AUTO: 10.1 FL (ref 9.4–12.3)
POTASSIUM SERPL-SCNC: 3.6 MMOL/L (ref 3.5–5.1)
RBC # BLD AUTO: 4.65 M/UL (ref 4.05–5.2)
RBC #/AREA URNS HPF: 0 /HPF
SODIUM SERPL-SCNC: 141 MMOL/L (ref 136–145)
WBC # BLD AUTO: 8.6 K/UL (ref 4.3–11.1)
WBC URNS QL MICRO: ABNORMAL /HPF

## 2019-08-07 PROCEDURE — 51701 INSERT BLADDER CATHETER: CPT | Performed by: EMERGENCY MEDICINE

## 2019-08-07 PROCEDURE — 85025 COMPLETE CBC W/AUTO DIFF WBC: CPT

## 2019-08-07 PROCEDURE — 73090 X-RAY EXAM OF FOREARM: CPT

## 2019-08-07 PROCEDURE — 80307 DRUG TEST PRSMV CHEM ANLYZR: CPT

## 2019-08-07 PROCEDURE — 81025 URINE PREGNANCY TEST: CPT

## 2019-08-07 PROCEDURE — 70450 CT HEAD/BRAIN W/O DYE: CPT

## 2019-08-07 PROCEDURE — 74011636320 HC RX REV CODE- 636/320: Performed by: EMERGENCY MEDICINE

## 2019-08-07 PROCEDURE — 75810000293 HC SIMP/SUPERF WND  RPR: Performed by: EMERGENCY MEDICINE

## 2019-08-07 PROCEDURE — 73130 X-RAY EXAM OF HAND: CPT

## 2019-08-07 PROCEDURE — 71260 CT THORAX DX C+: CPT

## 2019-08-07 PROCEDURE — 90715 TDAP VACCINE 7 YRS/> IM: CPT | Performed by: EMERGENCY MEDICINE

## 2019-08-07 PROCEDURE — 96374 THER/PROPH/DIAG INJ IV PUSH: CPT | Performed by: EMERGENCY MEDICINE

## 2019-08-07 PROCEDURE — 74011000258 HC RX REV CODE- 258: Performed by: EMERGENCY MEDICINE

## 2019-08-07 PROCEDURE — 99285 EMERGENCY DEPT VISIT HI MDM: CPT | Performed by: EMERGENCY MEDICINE

## 2019-08-07 PROCEDURE — 80048 BASIC METABOLIC PNL TOTAL CA: CPT

## 2019-08-07 PROCEDURE — 77030010507 HC ADH SKN DERMBND J&J -B

## 2019-08-07 PROCEDURE — 90471 IMMUNIZATION ADMIN: CPT | Performed by: EMERGENCY MEDICINE

## 2019-08-07 PROCEDURE — 74011250636 HC RX REV CODE- 250/636: Performed by: EMERGENCY MEDICINE

## 2019-08-07 PROCEDURE — 81015 MICROSCOPIC EXAM OF URINE: CPT

## 2019-08-07 PROCEDURE — 72125 CT NECK SPINE W/O DYE: CPT

## 2019-08-07 PROCEDURE — 77030011943

## 2019-08-07 RX ORDER — KETOROLAC TROMETHAMINE 30 MG/ML
15 INJECTION, SOLUTION INTRAMUSCULAR; INTRAVENOUS
Status: COMPLETED | OUTPATIENT
Start: 2019-08-07 | End: 2019-08-07

## 2019-08-07 RX ORDER — SODIUM CHLORIDE 0.9 % (FLUSH) 0.9 %
10 SYRINGE (ML) INJECTION
Status: COMPLETED | OUTPATIENT
Start: 2019-08-07 | End: 2019-08-07

## 2019-08-07 RX ADMIN — IOPAMIDOL 100 ML: 755 INJECTION, SOLUTION INTRAVENOUS at 18:00

## 2019-08-07 RX ADMIN — TETANUS TOXOID, REDUCED DIPHTHERIA TOXOID AND ACELLULAR PERTUSSIS VACCINE, ADSORBED 0.5 ML: 5; 2.5; 8; 8; 2.5 SUSPENSION INTRAMUSCULAR at 21:59

## 2019-08-07 RX ADMIN — SODIUM CHLORIDE 1000 ML: 900 INJECTION, SOLUTION INTRAVENOUS at 17:23

## 2019-08-07 RX ADMIN — KETOROLAC TROMETHAMINE 15 MG: 30 INJECTION, SOLUTION INTRAMUSCULAR at 20:14

## 2019-08-07 RX ADMIN — Medication 10 ML: at 18:00

## 2019-08-07 RX ADMIN — SODIUM CHLORIDE 100 ML: 900 INJECTION, SOLUTION INTRAVENOUS at 18:01

## 2019-08-07 NOTE — ED NOTES
Patient to CT in stretcher with Chrissy Herrera RN and this RN. Cycling vitals in place during transport.
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
## 2019-08-08 NOTE — DISCHARGE INSTRUCTIONS
Over-the-counter medications for discomfort  Check with any worsening or new problems  Certainly keep abrasions clean and return with any signs of infection  Leave Steri-Strips in place until they fall off  Sitter discussing substance issues with FAVOR : [phone]

## 2019-08-08 NOTE — ED NOTES
Pt is requesting to go home before her treatment is finished. MD notified on patients decisions. PT did not want us to clean and dress her wounds. Stated she can do so herself at home. Pt agree to let MD Maria Guadalupe Sanchez close her laceration on her L eyebrow area. She stated she did not want the doctor to place sutures required to close her laceration. Pt was given a wound disinfectant spray to take home.

## 2019-08-08 NOTE — ED TRIAGE NOTES
I have reviewed discharge instructions with the patient. The patient verbalized understanding. Patient left ED via Discharge Method: ambulatory to Home with friend. .    Opportunity for questions and clarification provided. Patient given 2 scripts. To continue your aftercare when you leave the hospital, you may receive an automated call from our care team to check in on how you are doing. This is a free service and part of our promise to provide the best care and service to meet your aftercare needs.  If you have questions, or wish to unsubscribe from this service please call [phone]. Thank you for Choosing our [facility].

## 2020-03-12 ENCOUNTER — HOSPITAL ENCOUNTER (INPATIENT)
Age: 35
LOS: 2 days | Discharge: HOME | DRG: 364 | End: 2020-03-16
Attending: EMERGENCY MEDICINE | Admitting: HOSPITALIST
Payer: MEDICAID

## 2020-03-12 ENCOUNTER — ANESTHESIA EVENT (OUTPATIENT)
Dept: SURGERY | Age: 35
DRG: 364 | End: 2020-03-12
Payer: MEDICAID

## 2020-03-12 DIAGNOSIS — L02.91 ABSCESS: Primary | ICD-10-CM

## 2020-03-12 DIAGNOSIS — F19.90 IV DRUG USER: ICD-10-CM

## 2020-03-12 DIAGNOSIS — L03.90 CELLULITIS, UNSPECIFIED CELLULITIS SITE: ICD-10-CM

## 2020-03-12 PROBLEM — F17.200 NICOTINE DEPENDENCE: Status: ACTIVE | Noted: 2020-03-12

## 2020-03-12 PROBLEM — E87.6 HYPOKALEMIA: Status: ACTIVE | Noted: 2020-03-12

## 2020-03-12 PROBLEM — L03.119 CELLULITIS, LEG: Status: ACTIVE | Noted: 2020-03-12

## 2020-03-12 PROBLEM — F19.10 IV DRUG ABUSE (HCC): Status: ACTIVE | Noted: 2020-03-12

## 2020-03-12 LAB
ALBUMIN SERPL-MCNC: 3.7 G/DL (ref 3.5–5)
ALBUMIN/GLOB SERPL: 0.7 {RATIO} (ref 1.2–3.5)
ALP SERPL-CCNC: 117 U/L (ref 50–136)
ALT SERPL-CCNC: 20 U/L (ref 12–65)
ANION GAP SERPL CALC-SCNC: 4 MMOL/L (ref 7–16)
AST SERPL-CCNC: 14 U/L (ref 15–37)
BASOPHILS # BLD: 0.1 K/UL (ref 0–0.2)
BASOPHILS NFR BLD: 1 % (ref 0–2)
BILIRUB SERPL-MCNC: 0.3 MG/DL (ref 0.2–1.1)
BUN SERPL-MCNC: 13 MG/DL (ref 6–23)
CALCIUM SERPL-MCNC: 9.2 MG/DL (ref 8.3–10.4)
CHLORIDE SERPL-SCNC: 104 MMOL/L (ref 98–107)
CO2 SERPL-SCNC: 30 MMOL/L (ref 21–32)
CREAT SERPL-MCNC: 0.86 MG/DL (ref 0.6–1)
DIFFERENTIAL METHOD BLD: ABNORMAL
EOSINOPHIL # BLD: 0.1 K/UL (ref 0–0.8)
EOSINOPHIL NFR BLD: 1 % (ref 0.5–7.8)
ERYTHROCYTE [DISTWIDTH] IN BLOOD BY AUTOMATED COUNT: 13.7 % (ref 11.9–14.6)
GLOBULIN SER CALC-MCNC: 5.2 G/DL (ref 2.3–3.5)
GLUCOSE SERPL-MCNC: 95 MG/DL (ref 65–100)
HCT VFR BLD AUTO: 39.2 % (ref 35.8–46.3)
HGB BLD-MCNC: 12.8 G/DL (ref 11.7–15.4)
IMM GRANULOCYTES # BLD AUTO: 0.1 K/UL (ref 0–0.5)
IMM GRANULOCYTES NFR BLD AUTO: 1 % (ref 0–5)
LACTATE SERPL-SCNC: 0.9 MMOL/L (ref 0.4–2)
LYMPHOCYTES # BLD: 3.4 K/UL (ref 0.5–4.6)
LYMPHOCYTES NFR BLD: 32 % (ref 13–44)
MAGNESIUM SERPL-MCNC: 1.9 MG/DL (ref 1.8–2.4)
MCH RBC QN AUTO: 27.8 PG (ref 26.1–32.9)
MCHC RBC AUTO-ENTMCNC: 32.7 G/DL (ref 31.4–35)
MCV RBC AUTO: 85 FL (ref 79.6–97.8)
MONOCYTES # BLD: 0.6 K/UL (ref 0.1–1.3)
MONOCYTES NFR BLD: 5 % (ref 4–12)
NEUTS SEG # BLD: 6.3 K/UL (ref 1.7–8.2)
NEUTS SEG NFR BLD: 60 % (ref 43–78)
NRBC # BLD: 0 K/UL (ref 0–0.2)
PLATELET # BLD AUTO: 362 K/UL (ref 150–450)
PMV BLD AUTO: 9 FL (ref 9.4–12.3)
POTASSIUM SERPL-SCNC: 3.4 MMOL/L (ref 3.5–5.1)
PROT SERPL-MCNC: 8.9 G/DL (ref 6.3–8.2)
RBC # BLD AUTO: 4.61 M/UL (ref 4.05–5.2)
SODIUM SERPL-SCNC: 138 MMOL/L (ref 136–145)
WBC # BLD AUTO: 10.5 K/UL (ref 4.3–11.1)

## 2020-03-12 PROCEDURE — 85025 COMPLETE CBC W/AUTO DIFF WBC: CPT

## 2020-03-12 PROCEDURE — 74011250637 HC RX REV CODE- 250/637: Performed by: FAMILY MEDICINE

## 2020-03-12 PROCEDURE — 96375 TX/PRO/DX INJ NEW DRUG ADDON: CPT

## 2020-03-12 PROCEDURE — 81025 URINE PREGNANCY TEST: CPT

## 2020-03-12 PROCEDURE — 96374 THER/PROPH/DIAG INJ IV PUSH: CPT

## 2020-03-12 PROCEDURE — 74011250636 HC RX REV CODE- 250/636: Performed by: SURGERY

## 2020-03-12 PROCEDURE — 83605 ASSAY OF LACTIC ACID: CPT

## 2020-03-12 PROCEDURE — 87040 BLOOD CULTURE FOR BACTERIA: CPT

## 2020-03-12 PROCEDURE — 99218 HC RM OBSERVATION: CPT

## 2020-03-12 PROCEDURE — 80053 COMPREHEN METABOLIC PANEL: CPT

## 2020-03-12 PROCEDURE — 74011250636 HC RX REV CODE- 250/636: Performed by: EMERGENCY MEDICINE

## 2020-03-12 PROCEDURE — 83735 ASSAY OF MAGNESIUM: CPT

## 2020-03-12 PROCEDURE — 99283 EMERGENCY DEPT VISIT LOW MDM: CPT

## 2020-03-12 RX ORDER — DIPHENHYDRAMINE HYDROCHLORIDE 50 MG/ML
12.5 INJECTION, SOLUTION INTRAMUSCULAR; INTRAVENOUS
Status: DISCONTINUED | OUTPATIENT
Start: 2020-03-12 | End: 2020-03-16 | Stop reason: HOSPADM

## 2020-03-12 RX ORDER — ONDANSETRON 2 MG/ML
4 INJECTION INTRAMUSCULAR; INTRAVENOUS
Status: DISCONTINUED | OUTPATIENT
Start: 2020-03-12 | End: 2020-03-16 | Stop reason: HOSPADM

## 2020-03-12 RX ORDER — IBUPROFEN 200 MG
1 TABLET ORAL EVERY 24 HOURS
Status: DISCONTINUED | OUTPATIENT
Start: 2020-03-12 | End: 2020-03-16 | Stop reason: HOSPADM

## 2020-03-12 RX ORDER — ACETAMINOPHEN 325 MG/1
650 TABLET ORAL
Status: DISCONTINUED | OUTPATIENT
Start: 2020-03-12 | End: 2020-03-16 | Stop reason: HOSPADM

## 2020-03-12 RX ORDER — VANCOMYCIN HYDROCHLORIDE 1 G/20ML
INJECTION, POWDER, LYOPHILIZED, FOR SOLUTION INTRAVENOUS EVERY 12 HOURS
Status: DISCONTINUED | OUTPATIENT
Start: 2020-03-12 | End: 2020-03-12 | Stop reason: CLARIF

## 2020-03-12 RX ORDER — SODIUM CHLORIDE 0.9 % (FLUSH) 0.9 %
5-40 SYRINGE (ML) INJECTION AS NEEDED
Status: DISCONTINUED | OUTPATIENT
Start: 2020-03-12 | End: 2020-03-16 | Stop reason: HOSPADM

## 2020-03-12 RX ORDER — CEFAZOLIN SODIUM/WATER 2 G/20 ML
2 SYRINGE (ML) INTRAVENOUS
Status: COMPLETED | OUTPATIENT
Start: 2020-03-12 | End: 2020-03-12

## 2020-03-12 RX ORDER — NALOXONE HYDROCHLORIDE 0.4 MG/ML
0.4 INJECTION, SOLUTION INTRAMUSCULAR; INTRAVENOUS; SUBCUTANEOUS AS NEEDED
Status: DISCONTINUED | OUTPATIENT
Start: 2020-03-12 | End: 2020-03-16 | Stop reason: HOSPADM

## 2020-03-12 RX ORDER — VANCOMYCIN 1.75 GRAM/500 ML IN 0.9 % SODIUM CHLORIDE INTRAVENOUS
1750 ONCE
Status: COMPLETED | OUTPATIENT
Start: 2020-03-12 | End: 2020-03-12

## 2020-03-12 RX ORDER — SODIUM CHLORIDE 0.9 % (FLUSH) 0.9 %
5-40 SYRINGE (ML) INJECTION EVERY 8 HOURS
Status: DISCONTINUED | OUTPATIENT
Start: 2020-03-12 | End: 2020-03-16 | Stop reason: HOSPADM

## 2020-03-12 RX ORDER — POTASSIUM CHLORIDE 20 MEQ/1
40 TABLET, EXTENDED RELEASE ORAL ONCE
Status: COMPLETED | OUTPATIENT
Start: 2020-03-12 | End: 2020-03-12

## 2020-03-12 RX ADMIN — Medication 10 ML: at 17:02

## 2020-03-12 RX ADMIN — Medication 2 G: at 21:17

## 2020-03-12 RX ADMIN — POTASSIUM CHLORIDE 40 MEQ: 20 TABLET, EXTENDED RELEASE ORAL at 15:06

## 2020-03-12 RX ADMIN — VANCOMYCIN HYDROCHLORIDE 1750 MG: 10 INJECTION, POWDER, LYOPHILIZED, FOR SOLUTION INTRAVENOUS at 12:50

## 2020-03-12 RX ADMIN — ACETAMINOPHEN 650 MG: 325 TABLET, FILM COATED ORAL at 21:42

## 2020-03-12 RX ADMIN — Medication 10 ML: at 21:17

## 2020-03-12 NOTE — H&P
Hospitalist H&P Note     Admit Date:  3/12/2020 10:23 AM   Name:  Sarah No   Age:  36 y.o.  :  1985   MRN:  [id number]   PCP:  None  Treatment Team: Attending Provider: Zeeshan Motta MD; Primary Nurse: Moiz Rodríguez RN  Skin lesion  HPI:   Pt with known h/o IV drug abuse, noticed skin lesion both legs ,initially noticed like small red rash and gradually increased in size with yellow discoloration in the center,associated with pain. Pt otherwise didn't c/o fever or palpitation or sob or headache or dizziness.    k 3.4  Pt will be admitted for bilateral leg skin lesion with abscess. 10 systems reviewed and negative except as noted in HPI.   medical history. - nil   surgical history- nil   No Known Allergies - nil  Social History     Tobacco Use    Smoking status: Current Every Day Smoker     Packs/day: 1.00   Substance Use Topics    Alcohol use: No    IV DRUG ABUSE   family history- nil  Immunization History   Administered Date(s) Administered    Tdap 2019     PTA Medications:  None       Objective:     Patient Vitals for the past 24 hrs:   Temp Pulse Resp BP SpO2   20 1012 97.8 °F (36.6 °C) (!) 102 18 138/79 94 %     Oxygen Therapy  O2 Sat (%): 94 % (20 1012)  O2 Device: Room air (20 1012)  No intake or output data in the 24 hours ending 20 1450    Physical Exam:  General:    Well nourished. Alert. Eyes:   Normal sclera. Extraocular movements intact. ENT:  Normocephalic, atraumatic. Moist mucous membranes  CV:   RRR. No murmur, rub, or gallop. Lungs:  CTAB. No wheezing, rhonchi, or rales. Abdomen: Soft, nontender, nondistended. Bowel sounds normal.   Extremities: Warm and dry. No cyanosis or edema. left elbow,anticubital fossa,lateral aspect,fluctuant mass/cyst, non tender- according to pt there for almost a month,says normal that the region she does iv drug. Neurologic: CN II-XII grossly intact. Sensation intact.   Skin:     Upper 1/3rd medial aspect of the rt and left leg,erythema, induration and fluctuant in the middle  Psych:  Normal mood and affect. I reviewed the labs. Data Review:   Recent Results (from the past 24 hour(s))   CBC WITH AUTOMATED DIFF    Collection Time: 03/12/20 10:14 AM   Result Value Ref Range    WBC 10.5 4.3 - 11.1 K/uL    RBC 4.61 4.05 - 5.2 M/uL    HGB 12.8 11.7 - 15.4 g/dL    HCT 39.2 35.8 - 46.3 %    MCV 85.0 79.6 - 97.8 FL    MCH 27.8 26.1 - 32.9 PG    MCHC 32.7 31.4 - 35.0 g/dL    RDW 13.7 11.9 - 14.6 %    PLATELET 318 843 - 855 K/uL    MPV 9.0 (L) 9.4 - 12.3 FL    ABSOLUTE NRBC 0.00 0.0 - 0.2 K/uL    DF AUTOMATED      NEUTROPHILS 60 43 - 78 %    LYMPHOCYTES 32 13 - 44 %    MONOCYTES 5 4.0 - 12.0 %    EOSINOPHILS 1 0.5 - 7.8 %    BASOPHILS 1 0.0 - 2.0 %    IMMATURE GRANULOCYTES 1 0.0 - 5.0 %    ABS. NEUTROPHILS 6.3 1.7 - 8.2 K/UL    ABS. LYMPHOCYTES 3.4 0.5 - 4.6 K/UL    ABS. MONOCYTES 0.6 0.1 - 1.3 K/UL    ABS. EOSINOPHILS 0.1 0.0 - 0.8 K/UL    ABS. BASOPHILS 0.1 0.0 - 0.2 K/UL    ABS. IMM. GRANS. 0.1 0.0 - 0.5 K/UL   METABOLIC PANEL, COMPREHENSIVE    Collection Time: 03/12/20 10:14 AM   Result Value Ref Range    Sodium 138 136 - 145 mmol/L    Potassium 3.4 (L) 3.5 - 5.1 mmol/L    Chloride 104 98 - 107 mmol/L    CO2 30 21 - 32 mmol/L    Anion gap 4 (L) 7 - 16 mmol/L    Glucose 95 65 - 100 mg/dL    BUN 13 6 - 23 MG/DL    Creatinine 0.86 0.6 - 1.0 MG/DL    GFR est AA >60 >60 ml/min/1.73m2    GFR est non-AA >60 >60 ml/min/1.73m2    Calcium 9.2 8.3 - 10.4 MG/DL    Bilirubin, total 0.3 0.2 - 1.1 MG/DL    ALT (SGPT) 20 12 - 65 U/L    AST (SGOT) 14 (L) 15 - 37 U/L    Alk.  phosphatase 117 50 - 136 U/L    Protein, total 8.9 (H) 6.3 - 8.2 g/dL    Albumin 3.7 3.5 - 5.0 g/dL    Globulin 5.2 (H) 2.3 - 3.5 g/dL    A-G Ratio 0.7 (L) 1.2 - 3.5     LACTIC ACID    Collection Time: 03/12/20 10:14 AM   Result Value Ref Range    Lactic acid 0.9 0.4 - 2.0 MMOL/L       All Micro Results     Procedure Component Value Units Date/Time CULTURE, BLOOD [[id number]] Collected:  03/12/20 1248    Order Status:  Completed Specimen:  Blood Updated:  03/12/20 1328    CULTURE, BLOOD [[id number]] Collected:  03/12/20 1248    Order Status:  Completed Specimen:  Blood Updated:  03/12/20 1328          Other Studies:  No results found. Assessment and Plan:     Hospital Problems as of 3/12/2020 Never Reviewed          Codes Class Noted - Resolved POA    * (Principal) Abscess ICD-10-CM: L02.91  ICD-9-CM: 682.9  3/12/2020 - Present Unknown        Cellulitis, leg ICD-10-CM: L03.119  ICD-9-CM: 682.6  3/12/2020 - Present Unknown        Hypokalemia ICD-10-CM: E87.6  ICD-9-CM: 276.8  3/12/2020 - Present Unknown        IV drug abuse ([facility] [address] ICD-10-CM: F19.10  ICD-9-CM: 305.90  3/12/2020 - Present Unknown        Nicotine dependence ICD-10-CM: F17.200  ICD-9-CM: 305.1  3/12/2020 - Present Unknown              PLAN:  Abscess both legs- cont vancomycin,consult surgery for prob I&D  Hypokalemia- replace, order mag levels  Iv drug abuse- advised in cessation. Nicotine dependence-advised on cessation.     DVT ppx:  scd  Anticipated DC needs:    Code status:  Full  Estimated LOS:  Less than 2 midnights  Risk:  high    Signed:  Melecio Melendrez MD

## 2020-03-12 NOTE — ED PROVIDER NOTES
Bonny Manzo is a 36 y.o. female who presents to the ED with a chief complaint of skin abscesses. She is an IV drug user and states she has had lesions for the last month. She has several small ones around her feet. She is also had one in her left antecubital region. She has now developed 2 new ones around her knees. She denies injecting into the site specifically. She denies any needle breakage. The only one that is not red is the one in the left antecubital region. There is no fever or systemic symptoms. Kaci Titus MD; 3/12/2020 @10:54 AM Voice dictation software was used during the making of this note. This software is not perfect and grammatical and other typographical errors may be present. This note has not been proofread for errors.  ===================================================================         Skin Problem           History reviewed. No pertinent past medical history. History reviewed. No pertinent surgical history. History reviewed. No pertinent family history.     Social History     Socioeconomic History    Marital status: SINGLE     Spouse name: Not on file    Number of children: Not on file    Years of education: Not on file    Highest education level: Not on file   Occupational History    Not on file   Social Needs    Financial resource strain: Not on file    Food insecurity     Worry: Not on file     Inability: Not on file    Transportation needs     Medical: Not on file     Non-medical: Not on file   Tobacco Use    Smoking status: Current Every Day Smoker     Packs/day: 1.00   Substance and Sexual Activity    Alcohol use: No    Drug use: No    Sexual activity: Not on file   Lifestyle    Physical activity     Days per week: Not on file     Minutes per session: Not on file    Stress: Not on file   Relationships    Social connections     Talks on phone: Not on file     Gets together: Not on file     Attends [employer] service: Not on file Active member of club or organization: Not on file     Attends meetings of clubs or organizations: Not on file     Relationship status: Not on file    Intimate partner violence     Fear of current or ex partner: Not on file     Emotionally abused: Not on file     Physically abused: Not on file     Forced sexual activity: Not on file   Other Topics Concern    Not on file   Social History Narrative    Not on file         ALLERGIES: Patient has no known allergies. Review of Systems   Constitutional: Negative for chills and fever. Respiratory: Negative for chest tightness and shortness of breath. Cardiovascular: Negative for chest pain and palpitations. Gastrointestinal: Negative for abdominal pain, diarrhea, nausea and vomiting. Musculoskeletal: Negative for arthralgias, neck pain and neck stiffness. Skin: Positive for color change and wound. Negative for pallor and rash. Psychiatric/Behavioral: Negative for agitation and self-injury. The patient is not nervous/anxious. All other systems reviewed and are negative. Vitals:    03/12/20 1012   BP: 138/79   Pulse: (!) 102   Resp: 18   Temp: 97.8 °F (36.6 °C)   SpO2: 94%   Weight: 67.1 kg (148 lb)   Height: 5' 7\" (1.702 m)            Physical Exam  Vitals signs and nursing note reviewed. Constitutional:       General: She is not in acute distress. Appearance: Normal appearance. She is well-developed. She is not ill-appearing, toxic-appearing or diaphoretic. HENT:      Head: Normocephalic and atraumatic. Eyes:      General: No scleral icterus. Conjunctiva/sclera: Conjunctivae normal.   Neck:      Musculoskeletal: No neck rigidity. Pulmonary:      Effort: Pulmonary effort is normal. No respiratory distress. Breath sounds: No stridor. No wheezing, rhonchi or rales. Abdominal:      Tenderness: There is no abdominal tenderness. There is no guarding or rebound.    Musculoskeletal:         General: No swelling, tenderness or deformity. Skin:     General: Skin is warm. Coloration: Skin is not jaundiced or pale. Findings: Erythema present. No bruising. Comments: There are multiple abscesses and skin irritations present the lower extremities. The only large ones are located on the right lower extremity on the posterior aspect there is a 6 cm area of induration and fluctuance. Right anticubital region is swollen but not erythematous it feels like a large cyst in this region. The left lower extremity has 4 to 5 cm abscess with cellulitic changes throughout the leg from about the knee down inferiorly. Neurological:      General: No focal deficit present. Mental Status: She is alert and oriented to person, place, and time. Mental status is at baseline. Psychiatric:         Mood and Affect: Mood normal.         Behavior: Behavior normal.         Thought Content: Thought content normal.          MDM  Number of Diagnoses or Management Options  Diagnosis management comments: Given multiple extensive locations of patient's abscess and cellulitis I feel she would benefit from IV antibiotics. I am giving IV vancomycin currently. No signs of sepsis from the blood work. Henrandez Holcomb MD; 3/12/2020 @1:02 PM Voice dictation software was used during the making of this note. This software is not perfect and grammatical and other typographical errors may be present.   This note has not been proofread for errors.  ===================================================================          Amount and/or Complexity of Data Reviewed  Clinical lab tests: ordered and reviewed (Results for orders placed or performed during the hospital encounter of 03/12/20  -CBC WITH AUTOMATED DIFF       Result                      Value             Ref Range           WBC                         10.5              4.3 - 11.1 K*       RBC                         4.61              4.05 - 5.2 M*       HGB                         12.8 11.7 - 15.4 *       HCT                         39.2              35.8 - 46.3 %       MCV                         85.0              79.6 - 97.8 *       MCH                         27.8              26.1 - 32.9 *       MCHC                        32.7              31.4 - 35.0 *       RDW                         13.7              11.9 - 14.6 %       PLATELET                    362               150 - 450 K/*       MPV                         9.0 (L)           9.4 - 12.3 FL       ABSOLUTE NRBC               0.00              0.0 - 0.2 K/*       DF                          AUTOMATED                             NEUTROPHILS                 60                43 - 78 %           LYMPHOCYTES                 32                13 - 44 %           MONOCYTES                   5                 4.0 - 12.0 %        EOSINOPHILS                 1                 0.5 - 7.8 %         BASOPHILS                   1                 0.0 - 2.0 %         IMMATURE GRANULOCYTES       1                 0.0 - 5.0 %         ABS. NEUTROPHILS            6.3               1.7 - 8.2 K/*       ABS. LYMPHOCYTES            3.4               0.5 - 4.6 K/*       ABS. MONOCYTES              0.6               0.1 - 1.3 K/*       ABS. EOSINOPHILS            0.1               0.0 - 0.8 K/*       ABS. BASOPHILS              0.1               0.0 - 0.2 K/*       ABS. IMM.  GRANS.            0.1               0.0 - 0.5 K/*  -METABOLIC PANEL, COMPREHENSIVE       Result                      Value             Ref Range           Sodium                      138               136 - 145 mm*       Potassium                   3.4 (L)           3.5 - 5.1 mm*       Chloride                    104               98 - 107 mmo*       CO2                         30                21 - 32 mmol*       Anion gap                   4 (L)             7 - 16 mmol/L       Glucose                     95                65 - 100 mg/*       BUN                         13 6 - 23 MG/DL        Creatinine                  0.86              0.6 - 1.0 MG*       GFR est AA                  >60               >60 ml/min/1*       GFR est non-AA              >60               >60 ml/min/1*       Calcium                     9.2               8.3 - 10.4 M*       Bilirubin, total            0.3               0.2 - 1.1 MG*       ALT (SGPT)                  20                12 - 65 U/L         AST (SGOT)                  14 (L)            15 - 37 U/L         Alk.  phosphatase            117               50 - 136 U/L        Protein, total              8.9 (H)           6.3 - 8.2 g/*       Albumin                     3.7               3.5 - 5.0 g/*       Globulin                    5.2 (H)           2.3 - 3.5 g/*       A-G Ratio                   0.7 (L)           1.2 - 3.5      -LACTIC ACID       Result                      Value             Ref Range           Lactic acid                 0.9               0.4 - 2.0 MM* )  Tests in the radiology section of CPT®: ordered and reviewed  Discuss the patient with other providers: yes           Procedures

## 2020-03-12 NOTE — ED TRIAGE NOTES
Patient has multiple abscesses on both legs. States it began with her toe about 2 months ago. Patient admits to IV drug use. Patient also has a large swollen area to left [facility] where she has used.

## 2020-03-12 NOTE — ED NOTES
TRANSFER - OUT REPORT:    Verbal report given to Carin AGUIRRE(name) on [employer]  being transferred to [facility](unit) for routine progression of care       Report consisted of patients Situation, Background, Assessment and   Recommendations(SBAR). Information from the following report(s) SBAR was reviewed with the receiving nurse. Lines:   Peripheral IV 03/12/20 Right Antecubital (Active)        Opportunity for questions and clarification was provided.       Patient transported with:   Registered Nurse

## 2020-03-12 NOTE — PROGRESS NOTES
03/12/20 1708   Dual Skin Pressure Injury Assessment   Dual Skin Pressure Injury Assessment WDL   Second Care Provider (Based on [address] [place] [address]) [address] RN

## 2020-03-12 NOTE — ED NOTES
Report from [facility] [place] [facility]", transfer of care at this time.

## 2020-03-12 NOTE — PROGRESS NOTES
Initial visit was made, emotional support and a spiritual presence were provided for the patient and her family member.      COLLEEN Sanon

## 2020-03-12 NOTE — PROGRESS NOTES
TRANSFER - IN REPORT:    Verbal report received from [place], RN on [employer]  being received from ED for routine progression of care      Report consisted of patients Situation, Background, Assessment and   Recommendations(SBAR). Information from the following report(s) SBAR, Kardex, ED Summary, Intake/Output, MAR and Recent Results was reviewed with the receiving nurse. Opportunity for questions and clarification was provided. Assessment completed upon patients arrival to unit and care assumed.

## 2020-03-12 NOTE — PROGRESS NOTES
END OF SHIFT NOTE:    INTAKE/OUTPUT  No intake/output data recorded. Voiding: YES  Catheter: NO  Drain:              Flatus: Patient does have flatus present. Stool:  0 occurrences. Characteristics:  Stool Assessment  Stool Appearance: (no stool to assess)    Emesis: 0 occurrences. Characteristics:        VITAL SIGNS  Patient Vitals for the past 12 hrs:   Temp Pulse Resp BP SpO2   03/12/20 1616 97.7 °F (36.5 °C) 95 18 115/72 98 %   03/12/20 1451 97.7 °F (36.5 °C) 91 16 109/78 100 %   03/12/20 1012 97.8 °F (36.6 °C) (!) 102 18 138/79 94 %       Pain Assessment  Pain Intensity 1: 0 (03/12/20 1708)        Patient Stated Pain Goal: 0    Ambulating  Yes    Shift report given to oncoming nurse at the bedside.     Rasheed Morning

## 2020-03-12 NOTE — PROGRESS NOTES
Pharmacokinetic Consult to Pharmacist    Yaakov Kari is a 36 y.o. female being treated for SSTi with vancomycin. Height: 5' 7\" (170.2 cm)  Weight: 67.1 kg (148 lb)  Lab Results   Component Value Date/Time    BUN 13 03/12/2020 10:14 AM    Creatinine 0.86 03/12/2020 10:14 AM    WBC 10.5 03/12/2020 10:14 AM    Lactic acid 0.9 03/12/2020 10:14 AM      Estimated Creatinine Clearance: 90.5 mL/min (based on SCr of 0.86 mg/dL). CULTURES:  No results found for: VANCT      Day 1 of vancomycin. Goal trough is 10-20. Vancomycin originally indicated for bloodstream infection. Loaded with 1750mg x 1. Will follow with 1000mg q 12 hours for SSTI. Will continue to follow patient.       Thank you,  Gen Eng PharmD  [phone]

## 2020-03-12 NOTE — PROGRESS NOTES
Surgery  Consult dictated. 36year old female IVDA with abscesses and surrounding cellulitis on both medial calf regions. These are likely from skin-popping with dirty needles. She has multiple scars from previous injections and/or infections. Plan: 1. Medical care as per hospitalists  2. Antibiotics as per hospitalists. 3. Patient ate one hour ago, so will add her on for surgery tomorrow. Will plan bilateral calf abscesses incision and drainage. Wounds to be left open to heal by secondary intention.   Nick Ahuja MD.

## 2020-03-13 ENCOUNTER — ANESTHESIA (OUTPATIENT)
Dept: SURGERY | Age: 35
DRG: 364 | End: 2020-03-13
Payer: MEDICAID

## 2020-03-13 LAB
ANION GAP SERPL CALC-SCNC: 5 MMOL/L (ref 7–16)
BUN SERPL-MCNC: 10 MG/DL (ref 6–23)
CALCIUM SERPL-MCNC: 8.2 MG/DL (ref 8.3–10.4)
CHLORIDE SERPL-SCNC: 106 MMOL/L (ref 98–107)
CO2 SERPL-SCNC: 29 MMOL/L (ref 21–32)
CREAT SERPL-MCNC: 0.72 MG/DL (ref 0.6–1)
GLUCOSE SERPL-MCNC: 105 MG/DL (ref 65–100)
HCG UR QL: NEGATIVE
POTASSIUM SERPL-SCNC: 3.9 MMOL/L (ref 3.5–5.1)
SODIUM SERPL-SCNC: 140 MMOL/L (ref 136–145)

## 2020-03-13 PROCEDURE — 99218 HC RM OBSERVATION: CPT

## 2020-03-13 PROCEDURE — 74011250637 HC RX REV CODE- 250/637: Performed by: FAMILY MEDICINE

## 2020-03-13 PROCEDURE — 36415 COLL VENOUS BLD VENIPUNCTURE: CPT

## 2020-03-13 PROCEDURE — 87077 CULTURE AEROBIC IDENTIFY: CPT

## 2020-03-13 PROCEDURE — 87075 CULTR BACTERIA EXCEPT BLOOD: CPT

## 2020-03-13 PROCEDURE — 76210000063 HC OR PH I REC FIRST 0.5 HR: Performed by: SURGERY

## 2020-03-13 PROCEDURE — 77030002996 HC SUT SLK J&J -A: Performed by: SURGERY

## 2020-03-13 PROCEDURE — 74011250636 HC RX REV CODE- 250/636: Performed by: ANESTHESIOLOGY

## 2020-03-13 PROCEDURE — 0J9N0ZZ DRAINAGE OF RIGHT LOWER LEG SUBCUTANEOUS TISSUE AND FASCIA, OPEN APPROACH: ICD-10-PCS | Performed by: SURGERY

## 2020-03-13 PROCEDURE — 87186 SC STD MICRODIL/AGAR DIL: CPT

## 2020-03-13 PROCEDURE — 76010000160 HC OR TIME 0.5 TO 1 HR INTENSV-TIER 1: Performed by: SURGERY

## 2020-03-13 PROCEDURE — 77030010509 HC AIRWY LMA MSK TELE -A: Performed by: ANESTHESIOLOGY

## 2020-03-13 PROCEDURE — 74011000250 HC RX REV CODE- 250: Performed by: NURSE ANESTHETIST, CERTIFIED REGISTERED

## 2020-03-13 PROCEDURE — 77030019908 HC STETH ESOPH SIMS -A: Performed by: ANESTHESIOLOGY

## 2020-03-13 PROCEDURE — 74011250637 HC RX REV CODE- 250/637: Performed by: SURGERY

## 2020-03-13 PROCEDURE — 77030019895 HC PCKNG STRP IODO -A: Performed by: SURGERY

## 2020-03-13 PROCEDURE — 76060000032 HC ANESTHESIA 0.5 TO 1 HR: Performed by: SURGERY

## 2020-03-13 PROCEDURE — 96376 TX/PRO/DX INJ SAME DRUG ADON: CPT

## 2020-03-13 PROCEDURE — 74011250636 HC RX REV CODE- 250/636: Performed by: FAMILY MEDICINE

## 2020-03-13 PROCEDURE — 87205 SMEAR GRAM STAIN: CPT

## 2020-03-13 PROCEDURE — 74011250636 HC RX REV CODE- 250/636: Performed by: NURSE ANESTHETIST, CERTIFIED REGISTERED

## 2020-03-13 PROCEDURE — 74011250636 HC RX REV CODE- 250/636: Performed by: SURGERY

## 2020-03-13 PROCEDURE — 74011250637 HC RX REV CODE- 250/637: Performed by: ANESTHESIOLOGY

## 2020-03-13 PROCEDURE — 74011000250 HC RX REV CODE- 250: Performed by: SURGERY

## 2020-03-13 PROCEDURE — 0J9P0ZZ DRAINAGE OF LEFT LOWER LEG SUBCUTANEOUS TISSUE AND FASCIA, OPEN APPROACH: ICD-10-PCS | Performed by: SURGERY

## 2020-03-13 PROCEDURE — 80048 BASIC METABOLIC PNL TOTAL CA: CPT

## 2020-03-13 RX ORDER — MIDAZOLAM HYDROCHLORIDE 1 MG/ML
5 INJECTION, SOLUTION INTRAMUSCULAR; INTRAVENOUS ONCE
Status: ACTIVE | OUTPATIENT
Start: 2020-03-13 | End: 2020-03-14

## 2020-03-13 RX ORDER — HYDROCODONE BITARTRATE AND ACETAMINOPHEN 5; 325 MG/1; MG/1
2 TABLET ORAL AS NEEDED
Status: DISCONTINUED | OUTPATIENT
Start: 2020-03-13 | End: 2020-03-13 | Stop reason: HOSPADM

## 2020-03-13 RX ORDER — LIDOCAINE HYDROCHLORIDE 10 MG/ML
0.1 INJECTION INFILTRATION; PERINEURAL AS NEEDED
Status: DISCONTINUED | OUTPATIENT
Start: 2020-03-13 | End: 2020-03-16 | Stop reason: HOSPADM

## 2020-03-13 RX ORDER — HYDROMORPHONE HYDROCHLORIDE 2 MG/ML
0.5 INJECTION, SOLUTION INTRAMUSCULAR; INTRAVENOUS; SUBCUTANEOUS
Status: DISCONTINUED | OUTPATIENT
Start: 2020-03-13 | End: 2020-03-13 | Stop reason: HOSPADM

## 2020-03-13 RX ORDER — MIDAZOLAM HYDROCHLORIDE 1 MG/ML
2 INJECTION, SOLUTION INTRAMUSCULAR; INTRAVENOUS
Status: ACTIVE | OUTPATIENT
Start: 2020-03-13 | End: 2020-03-14

## 2020-03-13 RX ORDER — OXYCODONE AND ACETAMINOPHEN 5; 325 MG/1; MG/1
1 TABLET ORAL
Status: DISCONTINUED | OUTPATIENT
Start: 2020-03-13 | End: 2020-03-13 | Stop reason: SDUPTHER

## 2020-03-13 RX ORDER — SODIUM CHLORIDE, SODIUM LACTATE, POTASSIUM CHLORIDE, CALCIUM CHLORIDE 600; 310; 30; 20 MG/100ML; MG/100ML; MG/100ML; MG/100ML
75 INJECTION, SOLUTION INTRAVENOUS CONTINUOUS
Status: DISCONTINUED | OUTPATIENT
Start: 2020-03-13 | End: 2020-03-13 | Stop reason: HOSPADM

## 2020-03-13 RX ORDER — SODIUM CHLORIDE, SODIUM LACTATE, POTASSIUM CHLORIDE, CALCIUM CHLORIDE 600; 310; 30; 20 MG/100ML; MG/100ML; MG/100ML; MG/100ML
75 INJECTION, SOLUTION INTRAVENOUS CONTINUOUS
Status: DISCONTINUED | OUTPATIENT
Start: 2020-03-13 | End: 2020-03-14

## 2020-03-13 RX ORDER — ONDANSETRON 2 MG/ML
INJECTION INTRAMUSCULAR; INTRAVENOUS AS NEEDED
Status: DISCONTINUED | OUTPATIENT
Start: 2020-03-13 | End: 2020-03-13 | Stop reason: HOSPADM

## 2020-03-13 RX ORDER — FENTANYL CITRATE 50 UG/ML
100 INJECTION, SOLUTION INTRAMUSCULAR; INTRAVENOUS ONCE
Status: COMPLETED | OUTPATIENT
Start: 2020-03-13 | End: 2020-03-13

## 2020-03-13 RX ORDER — PROPOFOL 10 MG/ML
INJECTION, EMULSION INTRAVENOUS AS NEEDED
Status: DISCONTINUED | OUTPATIENT
Start: 2020-03-13 | End: 2020-03-13 | Stop reason: HOSPADM

## 2020-03-13 RX ORDER — CEFAZOLIN SODIUM/WATER 2 G/20 ML
SYRINGE (ML) INTRAVENOUS AS NEEDED
Status: DISCONTINUED | OUTPATIENT
Start: 2020-03-13 | End: 2020-03-13 | Stop reason: HOSPADM

## 2020-03-13 RX ORDER — OXYCODONE HYDROCHLORIDE 5 MG/1
5 TABLET ORAL
Status: COMPLETED | OUTPATIENT
Start: 2020-03-13 | End: 2020-03-13

## 2020-03-13 RX ORDER — LIDOCAINE HYDROCHLORIDE 20 MG/ML
INJECTION, SOLUTION EPIDURAL; INFILTRATION; INTRACAUDAL; PERINEURAL AS NEEDED
Status: DISCONTINUED | OUTPATIENT
Start: 2020-03-13 | End: 2020-03-13 | Stop reason: HOSPADM

## 2020-03-13 RX ORDER — DEXAMETHASONE SODIUM PHOSPHATE 4 MG/ML
INJECTION, SOLUTION INTRA-ARTICULAR; INTRALESIONAL; INTRAMUSCULAR; INTRAVENOUS; SOFT TISSUE AS NEEDED
Status: DISCONTINUED | OUTPATIENT
Start: 2020-03-13 | End: 2020-03-13 | Stop reason: HOSPADM

## 2020-03-13 RX ORDER — BUPIVACAINE HYDROCHLORIDE 5 MG/ML
INJECTION, SOLUTION EPIDURAL; INTRACAUDAL AS NEEDED
Status: DISCONTINUED | OUTPATIENT
Start: 2020-03-13 | End: 2020-03-13 | Stop reason: HOSPADM

## 2020-03-13 RX ORDER — KETOROLAC TROMETHAMINE 30 MG/ML
30 INJECTION, SOLUTION INTRAMUSCULAR; INTRAVENOUS
Status: DISCONTINUED | OUTPATIENT
Start: 2020-03-13 | End: 2020-03-16 | Stop reason: HOSPADM

## 2020-03-13 RX ORDER — OXYCODONE AND ACETAMINOPHEN 5; 325 MG/1; MG/1
2 TABLET ORAL
Status: DISCONTINUED | OUTPATIENT
Start: 2020-03-13 | End: 2020-03-16 | Stop reason: HOSPADM

## 2020-03-13 RX ORDER — FENTANYL CITRATE 50 UG/ML
INJECTION, SOLUTION INTRAMUSCULAR; INTRAVENOUS AS NEEDED
Status: DISCONTINUED | OUTPATIENT
Start: 2020-03-13 | End: 2020-03-13 | Stop reason: HOSPADM

## 2020-03-13 RX ADMIN — Medication 1 AMPULE: at 21:47

## 2020-03-13 RX ADMIN — FENTANYL CITRATE 50 MCG: 50 INJECTION INTRAMUSCULAR; INTRAVENOUS at 15:51

## 2020-03-13 RX ADMIN — Medication 2 G: at 15:52

## 2020-03-13 RX ADMIN — Medication 1 AMPULE: at 02:12

## 2020-03-13 RX ADMIN — OXYCODONE HYDROCHLORIDE 5 MG: 5 TABLET ORAL at 16:36

## 2020-03-13 RX ADMIN — Medication 10 ML: at 05:09

## 2020-03-13 RX ADMIN — KETOROLAC TROMETHAMINE 30 MG: 30 INJECTION, SOLUTION INTRAMUSCULAR at 21:52

## 2020-03-13 RX ADMIN — FENTANYL CITRATE 50 MCG: 50 INJECTION, SOLUTION INTRAMUSCULAR; INTRAVENOUS at 14:23

## 2020-03-13 RX ADMIN — DEXAMETHASONE SODIUM PHOSPHATE 4 MG: 4 INJECTION, SOLUTION INTRAMUSCULAR; INTRAVENOUS at 15:48

## 2020-03-13 RX ADMIN — ONDANSETRON 4 MG: 2 INJECTION INTRAMUSCULAR; INTRAVENOUS at 15:48

## 2020-03-13 RX ADMIN — SODIUM CHLORIDE, SODIUM LACTATE, POTASSIUM CHLORIDE, AND CALCIUM CHLORIDE 75 ML/HR: 600; 310; 30; 20 INJECTION, SOLUTION INTRAVENOUS at 13:58

## 2020-03-13 RX ADMIN — FENTANYL CITRATE 25 MCG: 50 INJECTION INTRAMUSCULAR; INTRAVENOUS at 16:01

## 2020-03-13 RX ADMIN — SODIUM CHLORIDE, SODIUM LACTATE, POTASSIUM CHLORIDE, AND CALCIUM CHLORIDE: 600; 310; 30; 20 INJECTION, SOLUTION INTRAVENOUS at 15:33

## 2020-03-13 RX ADMIN — VANCOMYCIN HYDROCHLORIDE 1000 MG: 1 INJECTION, POWDER, LYOPHILIZED, FOR SOLUTION INTRAVENOUS at 01:35

## 2020-03-13 RX ADMIN — Medication 10 ML: at 17:44

## 2020-03-13 RX ADMIN — PROPOFOL 200 MG: 10 INJECTION, EMULSION INTRAVENOUS at 15:38

## 2020-03-13 RX ADMIN — LIDOCAINE HYDROCHLORIDE 100 MG: 20 INJECTION, SOLUTION EPIDURAL; INFILTRATION; INTRACAUDAL; PERINEURAL at 15:38

## 2020-03-13 RX ADMIN — Medication 1 AMPULE: at 08:08

## 2020-03-13 RX ADMIN — VANCOMYCIN HYDROCHLORIDE 1000 MG: 1 INJECTION, POWDER, LYOPHILIZED, FOR SOLUTION INTRAVENOUS at 12:41

## 2020-03-13 RX ADMIN — FENTANYL CITRATE 25 MCG: 50 INJECTION INTRAMUSCULAR; INTRAVENOUS at 15:56

## 2020-03-13 NOTE — CONSULTS
[facility]  CONSULTATION    Name:  Hafsa Abarca  MR#:  [id number]  :  1985  ACCOUNT #:  [de-identified]  DATE OF SERVICE:  2020      REASON FOR THE CONSULTATION:  Bilateral lower extremity abscesses. CONSULTING PHYSICIAN:  Aidee Flores MD    REQUESTING PHYSICIAN:  Carolina Isaac MD of the hospitalist service. HISTORY:  This is a 25-year-old female with a history of intravenous drug abuse who I was asked to see by the hospitalist service for cellulitis and abscess in both lower extremities. The patient was found in room 208 with multiple scars from previous injection sites and/or previous infections. I saw the patient with her nurse acting as a chaperone throughout my entire visit. The patient has a history of drug abuse. She has multiple areas of trauma to her right fourth toe, her right forefoot, left forefoot, left pretibial area, right ankle. The areas of concern are in the upper medial calf on the right and the left. My belief is that she was skin popping with contaminated needles and caused these abscesses. She has cellulitis and fluctuance in both areas very consistent with bilateral abscesses, which will need to be drained. I offered to do that this evening. However, the patient even despite being n.p.o. without a diet order had eaten a sandwich from [email or web address] that was brought in by an associate. She had eaten dinner tonight at being 06:30, I said we would do this tomorrow. I told her she needed to be n.p.o., not to eat or drink anything then we would incise and drain these abscesses and leave them open. She was agreeable to this. PAST MEDICAL HISTORY:  Intravenous drug abuse. She also reports a history of hyperlipidemia. She was told she needed to be on medication but has never followed up for that medication. PAST SURGICAL HISTORY:  She had a tubal ligation with internal hemorrhage that required an exploratory laparotomy.   This is all by her report. ALLERGIES:  NO KNOWN DRUG ALLERGIES. SOCIAL HISTORY:  She has intravenous drug abuse. She smokes a pack of cigarettes per day which she has done for 15 years. She denies alcohol use. PHYSICAL EXAMINATION:  VITAL SIGNS:  Pulse of 95, BP is 115/72, temp is 97.7. The O2 sats were 100% on room air. GENERAL APPEARANCE:  A disheveled, well-developed, well-nourished white female, presently awake, alert, oriented, able to answer all questions. The patient was alone in the room. I did use a chaperone throughout the visit being accompanied by her nurse. SKIN:  She has multiple areas on both upper extremities and lower extremities from track marks from intravenous drug use as well as previous sites of infection. They covered both arms and both lower extremities, the lower extremities from the knees distally. HEART:  Sinus rhythm, rate approximately 92. LUNGS:  Clear. No rales, rhonchi or wheezing. ABDOMEN:  Soft, nontender, nondistended. EXTREMITIES:  Multiple areas of track marks from intravenous drug use as well as she has two abscesses, one on the right upper medial calf and one on the right medial upper left calf. They almost appear like sister lesions. They are both fluctuant, erythematous markedly tender to touch, warm to touch. Both consistent with abscesses. LABORATORY DATA:  The blood work, magnesium was 1.9. Sodium 138, potassium 3.4, chloride 104, bicarb 30, BUN 13, creatinine 0.86. LFTs all normal.  White count 10.5, H and H 12.8/39.2, platelet count of 272. Lactic acid was 0.9. ASSESSMENT:  Bilateral calf abscesses, one on the right, one on the left from illicit drug use. She has a long history of this. PLAN:  1. Medical care as per the hospitalist service. 2.  Antibiotic coverage as per the hospitalist service. 3.  Incision and drainage of these two abscesses which I pointed out to the patient. I said we would leave the wounds open.   They would heal by secondary intention. She would remain in the hospital on antibiotics. I went through risks of bleeding, infection, anesthesia, obvious scar formation but she has numerous scars on her body so these should not appear terribly abnormal once healed. I went through the potential of not doing anything which could lead to worsening infection, potential for necrotizing fasciitis, potential loss of leg, potential life-threatening infection, potential for damage to heart valves. The patient was agreeable understanding that the wounds would be left open and to heal by secondary intention.       Lizet Orta MD      [id number]  D:  03/12/2020 18:22  T:  03/12/2020 23:44  JOB #:  [id number]

## 2020-03-13 NOTE — PROGRESS NOTES
END OF SHIFT NOTE:    INTAKE/OUTPUT  03/12 0701 - 03/13 0700  In: 250 [P.O.:250]  Out: 1000 [Urine:1000]  Voiding: YES  Catheter: NO  Drain:              Flatus: Patient does have flatus present. Stool:  0 occurrences. Characteristics:  Stool Assessment  Stool Appearance: (no stool to assess)    Emesis: 0 occurrences. Characteristics:        VITAL SIGNS  Patient Vitals for the past 12 hrs:   Temp Pulse Resp BP SpO2   03/13/20 0351 98.4 °F (36.9 °C) 91 18 127/77 95 %   03/13/20 0346 98.8 °F (37.1 °C) 81 18 105/62 100 %   03/12/20 2301 98.9 °F (37.2 °C) 78 18 110/63 99 %   03/12/20 1938 98.1 °F (36.7 °C) 95 18 105/69 99 %       Pain Assessment  Pain Intensity 1: 0 (03/13/20 0130)     Pain Intervention(s) 1: Emotional support(pt said just after walking)  Patient Stated Pain Goal: 0    Ambulating  Yes    Shift report given to oncoming nurse at the bedside.     Steffen Fleming RN

## 2020-03-13 NOTE — PERIOP NOTES
TRANSFER - OUT REPORT:    Verbal report given to Christy Watt RN on [employer]  being transferred to [facility] for routine post - op       Report consisted of patients Situation, Background, Assessment and   Recommendations(SBAR). Information from the following report(s) SBAR, Kardex, OR Summary, Intake/Output, MAR and Cardiac Rhythm NSR was reviewed with the receiving nurse. Lines:   Peripheral IV 03/13/20 Anterior; Left Hand (Active)   Site Assessment Clean, dry, & intact 3/13/2020  4:15 PM   Phlebitis Assessment 0 3/13/2020  4:15 PM   Infiltration Assessment 0 3/13/2020  4:15 PM   Dressing Status Clean, dry, & intact 3/13/2020  4:15 PM   Dressing Type Transparent 3/13/2020  4:15 PM   Hub Color/Line Status Pink;Patent; Infusing 3/13/2020  4:15 PM   Alcohol Cap Used No 3/13/2020  4:15 PM        Opportunity for questions and clarification was provided. Patient transported with:   Tech    VTE prophylaxis orders have not been written for [employer]. Patient and family given floor number and nurses name. Family updated re: pt status after security code verified.

## 2020-03-13 NOTE — BRIEF OP NOTE
BRIEF OPERATIVE NOTE    Date of Procedure: 3/13/2020   Preoperative Diagnosis: BILATERAL CALF ABSCESSES  Postoperative Diagnosis: SAME   Procedure(s):  INCISION AND DRAINAGE BILATERAL CALF ABSCESSES  Surgeon(s) and Role:     Ashley Riley MD - Primary         Surgical Assistant: None    Surgical Staff:  Circ-1: Davian Torres RN  Scrub Tech-1: Rosa Boudreaux  Scrub Tech-2: Victoria HALL  Event Time In   Incision Start 03/13/2020 1551   Incision Close      Anesthesia: General   Estimated Blood Loss: 20 cc's  Specimens:   ID Type Source Tests Collected by Time Destination   1 : Right Calf Abscess Wound Leg, Right CULTURE, ANAEROBIC, CULTURE, WOUND W Chica Nagel MD 3/13/2020 1559 Microbiology   2 : Left Calf Abscess Wound Leg, Left CULTURE, ANAEROBIC, CULTURE, WOUND W Chica Nagel MD 3/13/2020 1600 Microbiology      Findings: See dictated note   Complications: None.   Implants: * No implants in log *

## 2020-03-13 NOTE — ANESTHESIA PREPROCEDURE EVALUATION
Relevant Problems   No relevant active problems       Anesthetic History     PONV          Review of Systems / Medical History  Patient summary reviewed and pertinent labs reviewed    Pulmonary  Within defined limits                 Neuro/Psych   Within defined limits           Cardiovascular  Within defined limits                Exercise tolerance: >4 METS     GI/Hepatic/Renal  Within defined limits              Endo/Other  Within defined limits           Other Findings   Comments: IVDA    Cellulitis, bilateral calves         Physical Exam    Airway  Mallampati: II  TM Distance: 4 - 6 cm  Neck ROM: normal range of motion   Mouth opening: Normal     Cardiovascular    Rhythm: regular           Dental    Dentition: Full upper dentures and Poor dentition     Pulmonary  Breath sounds clear to auscultation               Abdominal         Other Findings            Anesthetic Plan    ASA: 3  Anesthesia type: general          Induction: Intravenous  Anesthetic plan and risks discussed with: Patient and Spouse

## 2020-03-13 NOTE — PERIOP NOTES
TRANSFER - IN REPORT:    Verbal report received from Yadiel Montaño RN on Deana Tellez  being received from 208 for ordered procedure      Report consisted of patients Situation, Background, Assessment and   Recommendations(SBAR). Information from the following report(s) SBAR and MAR was reviewed with the receiving nurse. Opportunity for questions and clarification was provided. Assessment To be completed upon patients arrival to unit and care assumed.

## 2020-03-13 NOTE — ANESTHESIA POSTPROCEDURE EVALUATION
Procedure(s):  INCISION AND DRAINAGE BILATERAL CALVES. general    Anesthesia Post Evaluation      Multimodal analgesia: multimodal analgesia used between 6 hours prior to anesthesia start to PACU discharge  Patient location during evaluation: bedside  Patient participation: complete - patient participated  Level of consciousness: awake and alert  Pain score: 3  Pain management: adequate  Airway patency: patent  Anesthetic complications: no  Cardiovascular status: acceptable and hemodynamically stable  Respiratory status: acceptable  Hydration status: acceptable  Post anesthesia nausea and vomiting:  none      Vitals Value Taken Time   /62 3/13/2020  4:39 PM   Temp 36.9 °C (98.5 °F) 3/13/2020  4:15 PM   Pulse 69 3/13/2020  4:44 PM   Resp 15 3/13/2020  4:39 PM   SpO2 100 % 3/13/2020  4:44 PM   Vitals shown include unvalidated device data.

## 2020-03-13 NOTE — PROGRESS NOTES
Hospitalist H&P Note     Admit Date:  3/12/2020 10:23 AM   Name:  Gaby Armenta   Age:  36 y.o.  :  1985   MRN:  [id number]   PCP:  None    subj     Pt with known h/o IV drug abuse, noticed skin lesion both legs ,initially noticed like small red rash and gradually increased in size with yellow discoloration in the center,associated with pain. Pt otherwise didn't c/o fever or palpitation or sob or headache or dizziness.     Today, had I&D bilaterally, no ac events, no fever    Objective:     Patient Vitals for the past 24 hrs:   Temp Pulse Resp BP SpO2   20 1654  72 15 115/67 99 %   20 1650  75 15 114/66 100 %   20 1645 98.3 °F (36.8 °C) 71 16 112/65 100 %   20 1639  72 15 106/62 100 %   20 1634  81 15 102/59 99 %   20 1629  79 16 113/55 99 %   20 1624  65 16 112/59 100 %   20 1619  68 16 111/56 100 %   20 1615 98.5 °F (36.9 °C) 69 18 133/63 100 %   20 1525  83 18 108/69 97 %   20 1515  80 16 115/69 95 %   20 1510  74  109/64 96 %   20 1505  75  106/65 97 %   20 1500  77  110/56 97 %   20 1455  76  109/67 97 %   20 1450  79  110/70 98 %   20 1445  74  105/64 97 %   20 1440  76  111/64 96 %   20 1435  78  104/64 96 %   20 1430  79  104/64 97 %   20 1425  79   98 %   20 1424  84   98 %   20 1330 98.2 °F (36.8 °C) 90 18 101/59 98 %   20 0750 98.4 °F (36.9 °C) 80 16 97/62 100 %   20 0351 98.4 °F (36.9 °C) 91 18 127/77 95 %   20 0346 98.8 °F (37.1 °C) 81 18 105/62 100 %   20 2301 98.9 °F (37.2 °C) 78 18 110/63 99 %   20 1938 98.1 °F (36.7 °C) 95 18 105/69 99 %     Oxygen Therapy  O2 Sat (%): 99 % (20)  Pulse via Oximetry: 72 beats per minute (20 165)  O2 Device: Room air (20)  O2 Flow Rate (L/min): 2 l/min (20 1629)    Intake/Output Summary (Last 24 hours) at 3/13/2020 [address] [facility] [address] filed at 3/13/2020 1645  Gross per 24 hour   Intake 1230 ml   Output 1015 ml   Net 215 ml       Physical Exam:  General:    Well nourished. Alert. Moderate distress  CV:   RRR. No murmur, rub, or gallop. Lungs:  CTAB. No wheezing, rhonchi, or rales. Abdomen: Soft, nontender, nondistended. Bowel sounds normal.   Extremities: Warm and dry. No cyanosis or edema. left elbow,anticubital fossa,lateral aspect,fluctuant mass/cyst, non tender- according to pt there for almost a month,says normal that the region she does iv drug. Neurologic: grossly intact. Skin:     Upper 1/3rd medial aspect of the rt and left leg,erythema, induration and fluctuant in the middle  Psych:  Normal mood and affect. I reviewed the labs.   Data Review:   Recent Results (from the past 24 hour(s))   METABOLIC PANEL, BASIC    Collection Time: 03/13/20  5:11 AM   Result Value Ref Range    Sodium 140 136 - 145 mmol/L    Potassium 3.9 3.5 - 5.1 mmol/L    Chloride 106 98 - 107 mmol/L    CO2 29 21 - 32 mmol/L    Anion gap 5 (L) 7 - 16 mmol/L    Glucose 105 (H) 65 - 100 mg/dL    BUN 10 6 - 23 MG/DL    Creatinine 0.72 0.6 - 1.0 MG/DL    GFR est AA >60 >60 ml/min/1.73m2    GFR est non-AA >60 >60 ml/min/1.73m2    Calcium 8.2 (L) 8.3 - 10.4 MG/DL       All Micro Results     Procedure Component Value Units Date/Time    CULTURE, ANAEROBIC [[id number]] Collected:  03/13/20 1615    Order Status:  Completed Specimen:  Surgical Specimen Updated:  03/13/20 1634    CULTURE, Yudith Lanius STAIN [[id number]] Collected:  03/13/20 1615    Order Status:  Completed Specimen:  Wound from Surgical Specimen Updated:  03/13/20 1632    CULTURE, ANAEROBIC [[id number]] Collected:  03/13/20 1600    Order Status:  Completed Specimen:  Surgical Specimen Updated:  03/13/20 1631    CULTURE, Yudith Lanius STAIN [[id number]] Collected:  03/13/20 1600    Order Status:  Completed Specimen:  Wound from Surgical Specimen Updated:  03/13/20 1624    CULTURE, BLOOD [[id number]] Collected:  03/12/20 1248    Order Status:  Completed Specimen:  Blood Updated:  03/13/20 0840     Special Requests: --        RIGHT  Antecubital       Culture result: NO GROWTH AFTER 19 HOURS       CULTURE, BLOOD [[id number]] Collected:  03/12/20 1248    Order Status:  Completed Specimen:  Blood Updated:  03/13/20 0840     Special Requests: --        RIGHT  HAND       Culture result: NO GROWTH AFTER 19 HOURS             Other Studies:  No results found. Assessment and Plan:     Hospital Problems as of 3/13/2020 Never Reviewed          Codes Class Noted - Resolved POA    * (Principal) Abscess ICD-10-CM: L02.91  ICD-9-CM: 682.9  3/12/2020 - Present Unknown        Cellulitis, leg ICD-10-CM: L03.119  ICD-9-CM: 682.6  3/12/2020 - Present Unknown        Hypokalemia ICD-10-CM: E87.6  ICD-9-CM: 276.8  3/12/2020 - Present Unknown        [facility] ICD-10-CM: F19.10  ICD-9-CM: 305.90  3/12/2020 - Present Unknown        Nicotine dependence ICD-10-CM: F17.200  ICD-9-CM: 305.1  3/12/2020 - Present Unknown              PLAN:  Abscess both legs- cont vancomycin, I&D today  Hypokalemia- replaced  Iv drug abuse- advised in cessation. Nicotine dependence-advised on cessation.     Dispo; home    Signed:  Karuna Beach MD

## 2020-03-14 LAB — VANCOMYCIN TROUGH SERPL-MCNC: 9.6 UG/ML (ref 5–20)

## 2020-03-14 PROCEDURE — 74011250636 HC RX REV CODE- 250/636: Performed by: HOSPITALIST

## 2020-03-14 PROCEDURE — 74011250636 HC RX REV CODE- 250/636: Performed by: SURGERY

## 2020-03-14 PROCEDURE — 77030019895 HC PCKNG STRP IODO -A

## 2020-03-14 PROCEDURE — 80202 ASSAY OF VANCOMYCIN: CPT

## 2020-03-14 PROCEDURE — 36415 COLL VENOUS BLD VENIPUNCTURE: CPT

## 2020-03-14 PROCEDURE — 65270000029 HC RM PRIVATE

## 2020-03-14 PROCEDURE — 74011250637 HC RX REV CODE- 250/637: Performed by: SURGERY

## 2020-03-14 PROCEDURE — 99218 HC RM OBSERVATION: CPT

## 2020-03-14 RX ORDER — VANCOMYCIN HYDROCHLORIDE
1250 EVERY 12 HOURS
Status: DISCONTINUED | OUTPATIENT
Start: 2020-03-14 | End: 2020-03-16

## 2020-03-14 RX ORDER — SODIUM CHLORIDE 0.9 % (FLUSH) 0.9 %
10 SYRINGE (ML) INJECTION AS NEEDED
Status: DISCONTINUED | OUTPATIENT
Start: 2020-03-14 | End: 2020-03-16 | Stop reason: HOSPADM

## 2020-03-14 RX ADMIN — VANCOMYCIN HYDROCHLORIDE 1000 MG: 1 INJECTION, POWDER, LYOPHILIZED, FOR SOLUTION INTRAVENOUS at 01:02

## 2020-03-14 RX ADMIN — Medication 40 ML: at 21:33

## 2020-03-14 RX ADMIN — OXYCODONE HYDROCHLORIDE AND ACETAMINOPHEN 2 TABLET: 5; 325 TABLET ORAL at 18:04

## 2020-03-14 RX ADMIN — Medication 10 ML: at 13:59

## 2020-03-14 RX ADMIN — KETOROLAC TROMETHAMINE 30 MG: 30 INJECTION, SOLUTION INTRAMUSCULAR at 21:28

## 2020-03-14 RX ADMIN — OXYCODONE HYDROCHLORIDE AND ACETAMINOPHEN 2 TABLET: 5; 325 TABLET ORAL at 11:07

## 2020-03-14 RX ADMIN — KETOROLAC TROMETHAMINE 30 MG: 30 INJECTION, SOLUTION INTRAMUSCULAR at 14:17

## 2020-03-14 RX ADMIN — VANCOMYCIN HYDROCHLORIDE 1250 MG: 10 INJECTION, POWDER, LYOPHILIZED, FOR SOLUTION INTRAVENOUS at 16:40

## 2020-03-14 RX ADMIN — SODIUM CHLORIDE, SODIUM LACTATE, POTASSIUM CHLORIDE, AND CALCIUM CHLORIDE 75 ML/HR: 600; 310; 30; 20 INJECTION, SOLUTION INTRAVENOUS at 11:06

## 2020-03-14 RX ADMIN — Medication 1 AMPULE: at 08:39

## 2020-03-14 RX ADMIN — Medication 1 AMPULE: at 21:24

## 2020-03-14 RX ADMIN — ONDANSETRON 4 MG: 2 INJECTION INTRAMUSCULAR; INTRAVENOUS at 01:00

## 2020-03-14 NOTE — OP NOTES
[address] [place] [address]  OPERATIVE REPORT    Name:  Arden Umaña  MR#:  [id number]  :  1985  ACCOUNT #:  [de-identified]  DATE OF SERVICE:  2020    PREOPERATIVE DIAGNOSIS:  Intravenous drug abuse with bilateral calf abscesses. POSTPROCEDURE DIAGNOSIS:  Intravenous drug abuse with bilateral calf abscesses. PROCEDURE PERFORMED:  Incision and drainage of bilateral calf abscesses. SURGEON:  Juvenal Diaz MD    ASSISTANT:  None. ANESTHESIA:  General endotracheal anesthesia with Dr. Nestor Miles and Delmy Lee as the CRNA.    ESTIMATED BLOOD LOSS:  20 mL. SPECIMENS REMOVED:  Culture swab of both the right calf abscess and the left calf abscess. DRAINS:  None. COMPLICATIONS:  None. IMPLANTS:  She had quarter-inch iodoform gauze left in both open wounds. These wounds were left open to heal by secondary intention. INDICATION:  The patient is 61-year-old drug abuser who came in with track marks and multiple areas of scar tissue from previous intravenous drug abuse as well as skin popping. The patient came in with two upraised, swollen, fluctuant areas on the medial calf almost look like sister lesions both on the right calf and the left calf upper medial calf area likely from skin popping. I recommended incision and drainage of these and leave the wounds open to heal by secondary intention. The patient was agreeable, signed a consent form. PROCEDURE:  The patient was seen in the preop area. Both areas were marked with a marking pen. She was transported to room #7 at [address] [place] [address] Operating Room where she was placed on the operating table in the supine position. General endotracheal anesthesia was administered with Dr. Nestor Miles. Both lower extremities from the knees to the toes were prepped and draped in the usual sterile manner. I came in the room, a time-out was done identifying the patient, surgeon, procedure and her birth date of 1985.   Both lesions were handled in the same way. We made a cruciate type incision with a 15-scalpel blade. I used a different 15 for both lesions. There was a large amount of purulent material and liquefied fat. This was taken out of the wound. There was no debridement. It was just squeezed out. The wound was then irrigated. Hemostasis was achieved with electrocautery. We did take culture swabs both the right calf abscess and the left calf abscess. Both wounds were packed with quarter-inch iodoform gauze after hemostasis had been achieved with electrocautery. The patient tolerated the procedure well. She had dry sterile dressings applied. I injected 15 mL of 0.5% Marcaine plain around both of the incision sites for a total of 30 mL. The patient tolerated the procedure well, was extubated, and brought to the recovery room in stable condition. We will have the packing removed. These wounds will heal by secondary intention.       Kinza Field MD      [id number]  D:  03/13/2020 16:11  T:  03/14/2020 3:16  JOB #:  [id number]

## 2020-03-14 NOTE — PHYSICIAN ADVISORY
UPGRADE LETTER  
 
upgraded from OBSERVATION  to INPATIENT Status Letter of Status Determination:  
Recommend hospitalization status upgraded from OBSERVATION  to INPATIENT  status Pt Name:  Asia Felix MR#  
TAMRA # [id number] / 
[id number] Payor: Kervin Odom / Plan[place] / Product Type: Managed Care Medicaid /   
CSN#  [id number] Room and Hospital  208/01  @ [facility] Hospitalization date  3/12/2020 10:23 AM  
Current Attending Physician  Sasha Cazares MD  
Principal diagnosis  Abscess [L02.91] Cellulitis, leg [O08.974] Clinicals  35 y/o female with a H/O IVDU underwent incision and drainage of B/L calf abscesses by surgeon necessitating ongoing close monitoring and management including with IV antibiotics and close follow up of wounds. Fairly complex level of care [facility] criteria Does  apply STATUS DETERMINATION  Based on documented presenting clinical data, comorbid conditions, high risk of adverse events and deterioration, it is our recommendation that the patient's status be upgraded from OBSERVATION to INPATIENT status. The final decision of the patient's hospitalization status depends on the Attending Physician's judgement. Additional comments Payor: Kervin Odom / [place] / Product Type: [employer] Care Medicaid / The information in this document is a recommendation to be used for utilization review and utilization management purposes only. This recommendation is not an order. The recommendation is made based on the information reviewed at the time of the referral, is pursuant to the [facility] Conditions of Participation (42 CFR Part 482), and is neither a judgment nor an assessment with regard to the appropriateness or quality of clinical care. For all Managed Care patients:  The Criteria are intended solely for use as screening guidelines with respect to the medical appropriateness of healthcare services and not for final clinical or payment determinations concerning the type or level of medical care provided, or proposed to be provided, to a patient. They help the reviewers determine whether a patient is appropriate for observation or inpatient admission at the time a decision to admit the patient is being made. All efforts are made to apply the pertinent payor criteria ([employer] or [employer]) as well as the clinical judgements based on the information reviewed at the time of the referral.\" Nothing in this document may be used to limit, alter, or affect clinical services provided to the patient named. Elkin Vanegas MD [facility] Physician Advisor [facility] [phone] [email or web address] 
  
11:38 AM 3/14/2020

## 2020-03-14 NOTE — PROGRESS NOTES
Pharmacokinetic Consult to Pharmacist    Nesha Walsh is a 36 y.o. female being treated for SSTI with vancomycin. Height: 5' 7\" (170.2 cm)  Weight: 67.1 kg (148 lb)  Lab Results   Component Value Date/Time    BUN 10 03/13/2020 05:11 AM    Creatinine 0.72 03/13/2020 05:11 AM    WBC 10.5 03/12/2020 10:14 AM    Lactic acid 0.9 03/12/2020 10:14 AM      Estimated Creatinine Clearance: 108.1 mL/min (based on SCr of 0.72 mg/dL). CULTURES:  Results     Procedure Component Value Units Date/Time    CULTURE, ANAEROBIC [[id number]] Collected:  03/13/20 1615    Order Status:  Completed Specimen:  Surgical Specimen Updated:  03/13/20 1805    CULTURE, Muna Hobbs STAIN [[id number]]  (Abnormal) Collected:  03/13/20 1615    Order Status:  Completed Specimen:  Wound from Left Updated:  03/14/20 1219     Special Requests: NO SPECIAL REQUESTS        GRAM STAIN 0 TO 3 WBCS SEEN PER OIF      FEW GRAM POSITIVE COCCI        Culture result:       LIGHT GRAM POSITIVE COCCI SUBCULTURE IN PROGRESS          CULTURE, ANAEROBIC [[id number]] Collected:  03/13/20 1600    Order Status:  Completed Specimen:  Surgical Specimen Updated:  03/13/20 1805    CULTURE, Muna Hobbs STAIN [[id number]] Collected:  03/13/20 1600    Order Status:  Completed Specimen:  Wound from Right Updated:  03/14/20 1231     Special Requests: NO SPECIAL REQUESTS        GRAM STAIN 0 TO 1 WBCS SEEN PER OIF      NO DEFINITE ORGANISM SEEN        Culture result:       NO GROWTH AFTER SHORT PERIOD OF INCUBATION. FURTHER RESULTS TO FOLLOW AFTER OVERNIGHT INCUBATION.           CULTURE, BLOOD [[id number]] Collected:  03/12/20 1248    Order Status:  Completed Specimen:  Blood Updated:  03/14/20 0642     Special Requests: --        RIGHT  Antecubital       Culture result: NO GROWTH 2 DAYS       CULTURE, BLOOD [[id number]] Collected:  03/12/20 1248    Order Status:  Completed Specimen:  Blood Updated:  03/14/20 4759     Special Requests: --        RIGHT  HAND       Culture result: NO GROWTH 2 DAYS               Lab Results   Component Value Date/Time    Vancomycin,trough 9.6 03/14/2020 11:52 AM       Day 3 of vancomycin. Goal trough is 10-20. Will increase dose to 1250mg q 12 hours. Will continue to follow patient. Thank you,  Marietta De Paz.  Libia Blair, PharmD, [address]  Clinical Pharmacist  [phone]

## 2020-03-14 NOTE — PROGRESS NOTES
Progress Note    Patient: Ananth Marshall MRN: [id number]  SSN: [id number]    YOB: 1985  Age: 36 y.o. Sex: female      Admit Date: 3/12/2020    1 Day Post-Op    Procedure:  Procedure(s):  INCISION AND DRAINAGE BILATERAL CALVES    Subjective:     Patient awake in bed. C/o pain to medial aspect of both legs. Dressing and packing removed. RN to lightly pack with NS dampened gauze and cover with 4x4 and gauze. AF, NAD. Objective:     Visit Vitals  /74   Pulse 86   Temp 97.4 °F (36.3 °C)   Resp 18   Ht 5' 7\" (1.702 m)   Wt 148 lb (67.1 kg)   SpO2 99%   Breastfeeding No   BMI 23.18 kg/m²       Temp (24hrs), Av.8 °F (36.6 °C), Min:97.3 °F (36.3 °C), Max:98.5 °F (36.9 °C)      Physical Exam:    Constitutional: Alert, cooperative, no acute distress; appears stated age   Eyes: Sclera are clear. EOMs intact  ENMT: no external lesions' gross hearing normal; no obvious neck masses, no ear or lip lesions, nares normal  CV: RRR. Normal perfusion  Resp: No JVD. Breathing is  non-labored; no audible wheezing. GI: soft, non-tender, non-distended     Integument: Right calf - No surrounding erythema, minimal bloody drainage. Wound bed beefy red. Left calf - mild erythema present, minimal bloody drainage. Wound bed beefy red. Musculoskeletal: unremarkable with normal function. No embolic signs or cyanosis. Neuro:  Oriented; moves all 4; no focal deficits  Psychiatric: normal affect and mood, no memory impairment      Lab Review: All lab results for the last 24 hours reviewed.     Assessment:     Hospital Problems  Never Reviewed          Codes Class Noted POA    * (Principal) Abscess ICD-10-CM: L02.91  ICD-9-CM: 682.9  3/12/2020 Unknown        Cellulitis, leg ICD-10-CM: W05.587  ICD-9-CM: 682.6  3/12/2020 Unknown        Hypokalemia ICD-10-CM: E87.6  ICD-9-CM: 276.8  3/12/2020 Unknown        IV drug abuse ([facility] ICD-10-CM: F19.10  ICD-9-CM: 305.90  3/12/2020 Unknown        Nicotine dependence ICD-10-CM: F17.200  ICD-9-CM: 305.1  3/12/2020 Unknown              Plan/Recommendations/Medical Decision Making:     Care Management per Hospitalist  IV Abx - Vancomycin  No further surgical needs. Will sign off at this time. Thank you for the consult. Please call for questions/ concerns.        Raymon Rivera NP

## 2020-03-14 NOTE — PROGRESS NOTES
END OF SHIFT NOTE:    INTAKE/OUTPUT  03/13 0701 - 03/14 0700  In: 7852 [P.O.:210; I.V.:1547]  Out: 365 [Urine:350]  Voiding: YES  Catheter: NO  Drain:              Flatus: Patient does have flatus present. Stool:  0 occurrences. Characteristics:  Stool Assessment  Stool Appearance: (no stool to assess)    Emesis: 0 occurrences. Characteristics:        VITAL SIGNS  Patient Vitals for the past 12 hrs:   Temp Pulse Resp BP SpO2   03/14/20 1625 99 °F (37.2 °C) 97 18 140/60 97 %   03/14/20 1219 97.4 °F (36.3 °C) 86 18 118/74 99 %   03/14/20 0715 98 °F (36.7 °C) 99 17 94/60 99 %       Pain Assessment  Pain Intensity 1: 6 (03/14/20 1804)  Pain Location 1: Leg  Pain Intervention(s) 1: Medication (see MAR)  Patient Stated Pain Goal: 0    Ambulating  Yes    Shift report given to oncoming nurse at the bedside.     Savanna Handy

## 2020-03-14 NOTE — PROGRESS NOTES
03/14/20 1804   Pain 1   Pain Scale 1 Numeric (0 - 10)   Pain Intensity 1 6   Pain Onset 1 post op   Pain Location 1 Leg   Pain Orientation 1 Left;Right   Pain Description 1 Aching   Pain Intervention(s) 1 Medication (see MAR)

## 2020-03-14 NOTE — PROGRESS NOTES
Hospitalist H&P Note     Admit Date:  3/12/2020 10:23 AM   Name:  Calvin Stevenson   Age:  36 y.o.  :  1985   MRN:  [id number]   PCP:  None    subj     Pt with known h/o IV drug abuse, noticed skin lesion both legs ,initially noticed like small red rash and gradually increased in size with yellow discoloration in the center,associated with pain. Pt otherwise didn't c/o fever or palpitation or sob or headache or dizziness. Today, pain controlled, no ac events, no fever    Objective:     Patient Vitals for the past 24 hrs:   Temp Pulse Resp BP SpO2   20 1219 97.4 °F (36.3 °C) 86 18 118/74 99 %   20 0715 98 °F (36.7 °C) 99 17 94/60 99 %   20 2335 98.1 °F (36.7 °C) 95 16 110/73 97 %   20 1923 97.3 °F (36.3 °C) 99 15 108/69 98 %   20 1722 97.3 °F (36.3 °C) 91 14 113/74 100 %   20 1654  72 15 115/67 99 %   20 1650  75 15 114/66 100 %   20 1645 98.3 °F (36.8 °C) 71 16 112/65 100 %   20 1639  72 15 106/62 100 %   20 1634  81 15 102/59 99 %   20 1629  79 16 113/55 99 %   20 1624  65 16 112/59 100 %   20 1619  68 16 111/56 100 %   20 1615 98.5 °F (36.9 °C) 69 18 133/63 100 %     Oxygen Therapy  O2 Sat (%): 99 % (20 1219)  Pulse via Oximetry: 72 beats per minute (20 1654)  O2 Device: Room air (20 1654)  O2 Flow Rate (L/min): 2 l/min (20 1629)    Intake/Output Summary (Last 24 hours) at 3/14/2020 1534  Last data filed at 3/14/2020 0112  Gross per 24 hour   Intake 1757 ml   Output 365 ml   Net 1392 ml       Physical Exam:  General:    Well nourished. Alert. Moderate distress  CV:   RRR. No murmur, rub, or gallop. Lungs:  CTAB. No wheezing, rhonchi, or rales. Abdomen: Soft, nontender, nondistended. Bowel sounds normal.   Extremities: kareem calf wounds 4x4 cm w/ drain each  Neurologic: grossly intact. Psych:  Normal mood and affect. I reviewed the labs.   Data Review:   Recent Results (from the past 24 hour(s))   CULTURE, WOUND W GRAM STAIN    Collection Time: 03/13/20  4:00 PM   Result Value Ref Range    Special Requests: NO SPECIAL REQUESTS      GRAM STAIN 0 TO 1 WBCS SEEN PER OIF     GRAM STAIN NO DEFINITE ORGANISM SEEN      Culture result:        NO GROWTH AFTER SHORT PERIOD OF INCUBATION. FURTHER RESULTS TO FOLLOW AFTER OVERNIGHT INCUBATION. CULTURE, WOUND Ethel Cifuentesals STAIN    Collection Time: 03/13/20  4:15 PM   Result Value Ref Range    Special Requests: NO SPECIAL REQUESTS      GRAM STAIN 0 TO 3 WBCS SEEN PER OIF     GRAM STAIN FEW GRAM POSITIVE COCCI      Culture result: LIGHT GRAM POSITIVE COCCI SUBCULTURE IN PROGRESS (A)     VANCOMYCIN, TROUGH    Collection Time: 03/14/20 11:52 AM   Result Value Ref Range    Vancomycin,trough 9.6 5 - 20 ug/mL       All Micro Results     Procedure Component Value Units Date/Time    CULTURE, Della Rapp [[id number]] Collected:  03/13/20 1600    Order Status:  Completed Specimen:  Wound from Right Updated:  03/14/20 1231     Special Requests: NO SPECIAL REQUESTS        GRAM STAIN 0 TO 1 WBCS SEEN PER OIF      NO DEFINITE ORGANISM SEEN        Culture result:       NO GROWTH AFTER SHORT PERIOD OF INCUBATION. FURTHER RESULTS TO FOLLOW AFTER OVERNIGHT INCUBATION.           CULTURE, Fabienne Henrywood STAIN [[id number]]  (Abnormal) Collected:  03/13/20 1615    Order Status:  Completed Specimen:  Wound from Left Updated:  03/14/20 1219     Special Requests: NO SPECIAL REQUESTS        GRAM STAIN 0 TO 3 WBCS SEEN PER OIF      FEW GRAM POSITIVE COCCI        Culture result:       LIGHT GRAM POSITIVE COCCI SUBCULTURE IN PROGRESS          CULTURE, BLOOD [[id number]] Collected:  03/12/20 1248    Order Status:  Completed Specimen:  Blood Updated:  03/14/20 0642     Special Requests: --        RIGHT  Antecubital       Culture result: NO GROWTH 2 DAYS       CULTURE, BLOOD [[id number]] Collected:  03/12/20 1248    Order Status:  Completed Specimen:  Blood Updated:  03/14/20 7402 Special Requests: --        RIGHT  HAND       Culture result: NO GROWTH 2 DAYS       CULTURE, ANAEROBIC [[id number]] Collected:  03/13/20 1600    Order Status:  Completed Specimen:  Surgical Specimen Updated:  03/13/20 1805    CULTURE, ANAEROBIC [[id number]] Collected:  03/13/20 1615    Order Status:  Completed Specimen:  Surgical Specimen Updated:  03/13/20 1805          Other Studies:  No results found. Assessment and Plan:     Hospital Problems as of 3/14/2020 Never Reviewed          Codes Class Noted - Resolved POA    * (Principal) Abscess ICD-10-CM: L02.91  ICD-9-CM: 682.9  3/12/2020 - Present Unknown        Cellulitis, leg ICD-10-CM: L03.119  ICD-9-CM: 682.6  3/12/2020 - Present Unknown        Hypokalemia ICD-10-CM: E87.6  ICD-9-CM: 276.8  3/12/2020 - Present Unknown        [facility] ICD-10-CM: F19.10  ICD-9-CM: 305.90  3/12/2020 - Present Unknown        Nicotine dependence ICD-10-CM: F17.200  ICD-9-CM: 305.1  3/12/2020 - Present Unknown              PLAN:    Abscess both legs: wound Cs growing G+ve cocci- cont vancomycin, s/p I&D  Hypokalemia- replaced  Iv drug abuse- advised in cessation. Nicotine dependence-advised on cessation.     Dispo; home    Signed:  Darylene Dotter, MD

## 2020-03-14 NOTE — PROGRESS NOTES
03/14/20 1107   Pain 1   Pain Scale 1 Numeric (0 - 10)   Pain Intensity 1 6   Pain Onset 1 post op   Pain Location 1 Leg   Pain Orientation 1 Left;Right   Pain Description 1 Aching   Pain Intervention(s) 1 Medication (see MAR)

## 2020-03-14 NOTE — PROGRESS NOTES
03/14/20 1421   Pain 1   Pain Scale 1 Numeric (0 - 10)   Pain Intensity 1 7   Pain Onset 1 post op   Pain Location 1 Leg   Pain Orientation 1 Left;Right   Pain Description 1 Aching   Pain Intervention(s) 1 Medication (see MAR)

## 2020-03-14 NOTE — PROGRESS NOTES
END OF SHIFT NOTE:    INTAKE/OUTPUT  03/13 0701 - 03/14 0700  In: 1780 [P.O.:210; I.V.:1547]  Out: 365 [Urine:350]  Voiding: YES  Catheter: NO  Drain:              Flatus: Patient does have flatus present. Stool:  0 occurrences. Characteristics:  Stool Assessment  Stool Appearance: (no stool to assess)    Emesis: 0 occurrences. Characteristics:        VITAL SIGNS  Patient Vitals for the past 12 hrs:   Temp Pulse Resp BP SpO2   03/13/20 2335 98.1 °F (36.7 °C) 95 16 110/73 97 %   03/13/20 1923 97.3 °F (36.3 °C) 99 15 108/69 98 %   03/13/20 1722 97.3 °F (36.3 °C) 91 14 113/74 100 %   03/13/20 1654  72 15 115/67 99 %   03/13/20 1650  75 15 114/66 100 %   03/13/20 1645 98.3 °F (36.8 °C) 71 16 112/65 100 %       Pain Assessment  Pain Intensity 1: 4 (03/13/20 2230)  Pain Location 1: Leg  Pain Intervention(s) 1: Medication (see MAR)  Patient Stated Pain Goal: 0    Ambulating  Yes  Postop drsg s remain dry/intact. Shift report given to oncoming nurse at the bedside.     Alex Monroy RN

## 2020-03-15 LAB
AMPHET UR QL SCN: POSITIVE
BARBITURATES UR QL SCN: NEGATIVE
BENZODIAZ UR QL: NEGATIVE
CANNABINOIDS UR QL SCN: NEGATIVE
COCAINE UR QL SCN: NEGATIVE
METHADONE UR QL: NEGATIVE
OPIATES UR QL: POSITIVE
PCP UR QL: NEGATIVE

## 2020-03-15 PROCEDURE — 74011250636 HC RX REV CODE- 250/636: Performed by: HOSPITALIST

## 2020-03-15 PROCEDURE — 74011250637 HC RX REV CODE- 250/637: Performed by: SURGERY

## 2020-03-15 PROCEDURE — 65270000029 HC RM PRIVATE

## 2020-03-15 PROCEDURE — 80307 DRUG TEST PRSMV CHEM ANLYZR: CPT

## 2020-03-15 RX ADMIN — Medication 10 ML: at 21:48

## 2020-03-15 RX ADMIN — OXYCODONE HYDROCHLORIDE AND ACETAMINOPHEN 2 TABLET: 5; 325 TABLET ORAL at 11:34

## 2020-03-15 RX ADMIN — Medication 1 AMPULE: at 08:19

## 2020-03-15 RX ADMIN — VANCOMYCIN HYDROCHLORIDE 1250 MG: 10 INJECTION, POWDER, LYOPHILIZED, FOR SOLUTION INTRAVENOUS at 02:44

## 2020-03-15 RX ADMIN — OXYCODONE HYDROCHLORIDE AND ACETAMINOPHEN 2 TABLET: 5; 325 TABLET ORAL at 02:54

## 2020-03-15 RX ADMIN — Medication 1 AMPULE: at 21:47

## 2020-03-15 RX ADMIN — Medication 10 ML: at 14:12

## 2020-03-15 RX ADMIN — VANCOMYCIN HYDROCHLORIDE 1250 MG: 10 INJECTION, POWDER, LYOPHILIZED, FOR SOLUTION INTRAVENOUS at 16:02

## 2020-03-15 RX ADMIN — OXYCODONE HYDROCHLORIDE AND ACETAMINOPHEN 2 TABLET: 5; 325 TABLET ORAL at 17:56

## 2020-03-15 NOTE — PROGRESS NOTES
03/15/20 1801   Pain 1   Pain Scale 1 Numeric (0 - 10)   Pain Intensity 1 6   Pain Onset 1 ongoing   Pain Location 1 Leg   Pain Orientation 1 Left;Right   Pain Description 1 Aching   Pain Intervention(s) 1 Medication (see MAR)

## 2020-03-15 NOTE — PROGRESS NOTES
Progress Note      Pt. Left am with IV and could not be found by nursing or security. Came back this am. Drug screen ordered.

## 2020-03-15 NOTE — PROGRESS NOTES
Hospitalist H&P Note     Admit Date:  3/12/2020 10:23 AM   Name:  Kay Ogden   Age:  36 y.o.  :  1985   MRN:  [id number]   PCP:  None    subj     Pt with known h/o IV drug abuse, noticed skin lesion both legs ,initially noticed like small red rash and gradually increased in size with yellow discoloration in the center,associated with pain. Pt otherwise didn't c/o fever or palpitation or sob or headache or dizziness. Today, pain controlled, no ac events, no fever, no changes    Objective:     Patient Vitals for the past 24 hrs:   Temp Pulse Resp BP SpO2   03/15/20 1122 98.3 °F (36.8 °C) 90 18 121/80 97 %   03/15/20 0808 97.5 °F (36.4 °C) 98 18 126/86 99 %   03/15/20 0418 98.1 °F (36.7 °C) 99 18 110/71 99 %   03/15/20 0032 98.1 °F (36.7 °C) 91 18 112/81 99 %   20 1935 97.7 °F (36.5 °C) 94 18 122/81 100 %   20 1625 99 °F (37.2 °C) 97 18 140/60 97 %     Oxygen Therapy  O2 Sat (%): 97 % (03/15/20 1122)  Pulse via Oximetry: 72 beats per minute (20 1654)  O2 Device: Room air (20 1654)  O2 Flow Rate (L/min): 2 l/min (20 1629)    Intake/Output Summary (Last 24 hours) at 3/15/2020 1436  Last data filed at 3/15/2020 1122  Gross per 24 hour   Intake 571 ml   Output 500 ml   Net 71 ml       Physical Exam:  General:    Well nourished. Alert. Moderate distress  CV:   RRR. No murmur, rub, or gallop. Lungs:  CTAB. No wheezing, rhonchi, or rales. Abdomen: Soft, nontender, nondistended. Bowel sounds normal.   Extremities: kareem calf wounds 4x4 cm in surg dressing  Neurologic: grossly intact. Psych:  Normal mood and affect. I reviewed the labs.   Data Review:   Recent Results (from the past 24 hour(s))   DRUG SCREEN, URINE    Collection Time: 03/15/20  7:15 AM   Result Value Ref Range    PCP(PHENCYCLIDINE) NEGATIVE       BENZODIAZEPINES NEGATIVE       COCAINE NEGATIVE       AMPHETAMINES POSITIVE      METHADONE NEGATIVE       THC (TH-CANNABINOL) NEGATIVE       OPIATES POSITIVE      BARBITURATES NEGATIVE          All Micro Results     Procedure Component Value Units Date/Time    CULTURE, Radha Sealsern STAIN [[id number]]  (Abnormal) Collected:  03/13/20 1615    Order Status:  Completed Specimen:  Wound from Left Updated:  03/15/20 0919     Special Requests: NO SPECIAL REQUESTS        GRAM STAIN 0 TO 3 WBCS SEEN PER OIF      FEW GRAM POSITIVE COCCI        Culture result:       LIGHT STAPHYLOCOCCUS AUREUS SENSITIVITY TO FOLLOW          CULTURE, Radha Skillern STAIN [[id number]] Collected:  03/13/20 1600    Order Status:  Completed Specimen:  Wound from Right Updated:  03/15/20 0915     Special Requests: NO SPECIAL REQUESTS        GRAM STAIN 0 TO 1 WBCS SEEN PER OIF      NO DEFINITE ORGANISM SEEN        Culture result: NO GROWTH 1 DAY       CULTURE, BLOOD [[id number]] Collected:  03/12/20 1248    Order Status:  Completed Specimen:  Blood Updated:  03/15/20 0613     Special Requests: --        RIGHT  Antecubital       Culture result: NO GROWTH 3 DAYS       CULTURE, BLOOD [[id number]] Collected:  03/12/20 1248    Order Status:  Completed Specimen:  Blood Updated:  03/15/20 0613     Special Requests: --        RIGHT  HAND       Culture result: NO GROWTH 3 DAYS       CULTURE, ANAEROBIC [[id number]] Collected:  03/13/20 1600    Order Status:  Completed Specimen:  Surgical Specimen Updated:  03/13/20 1805    CULTURE, ANAEROBIC [[id number]] Collected:  03/13/20 1615    Order Status:  Completed Specimen:  Surgical Specimen Updated:  03/13/20 1805          Other Studies:  No results found.     Assessment and Plan:     Hospital Problems as of 3/15/2020 Never Reviewed          Codes Class Noted - Resolved POA    * (Principal) Abscess ICD-10-CM: L02.91  ICD-9-CM: 682.9  3/12/2020 - Present Unknown        Cellulitis, leg ICD-10-CM: H37.813  ICD-9-CM: 682.6  3/12/2020 - Present Unknown        Hypokalemia ICD-10-CM: E87.6  ICD-9-CM: 276.8  3/12/2020 - Present Unknown        IV drug abuse ([facility] [address] ICD-10-CM: F19.10  ICD-9-CM: 305.90  3/12/2020 - Present Unknown        Nicotine dependence ICD-10-CM: F17.200  ICD-9-CM: 305.1  3/12/2020 - Present Unknown              PLAN:    Abscess both legs: wound Cs growing S. aureus- cont vancomycin, s/p I&D  Hypokalemia- replaced  Iv drug abuse- advised in cessation. Nicotine dependence-advised on cessation.     Dispo; home    Signed:  Mo Dejesus MD

## 2020-03-15 NOTE — PROGRESS NOTES
END OF SHIFT NOTE:    INTAKE/OUTPUT  No intake/output data recorded. Voiding: YES  Catheter: NO  Drain:              Flatus: Patient does have flatus present. Stool:  0 occurrences. Characteristics:  Stool Assessment  Stool Appearance: (no stool to assess)    Emesis: 0 occurrences. Characteristics:        VITAL SIGNS  Patient Vitals for the past 12 hrs:   Temp Pulse Resp BP SpO2   03/15/20 0032 98.1 °F (36.7 °C) 91 18 112/81 99 %   03/14/20 1935 97.7 °F (36.5 °C) 94 18 122/81 100 %       Pain Assessment  Pain Intensity 1: 6 (03/15/20 0254)  Pain Location 1: Leg  Pain Intervention(s) 1: Medication (see MAR)  Patient Stated Pain Goal: 0    Ambulating  Yes  Agreed to not leave floor again. Aware we need urine sample. Drsgs remain intact/dry to legs. Shift report given to oncoming nurse at the bedside.     Duyen Do RN

## 2020-03-15 NOTE — PROGRESS NOTES
Pt has left floor without telling anyone. Last seen 2130 after being given IV Toradol. Bilateral drsgs to lower legs intact when last seen. There are some personal belongings in room. We have searched floor/stairwells and grounds. She has not been seen. Security is assisting in this Supervisor aware. Messaged hospitalist to make them aware as well. 0030-Pt has been escorted back to room by security. States she was in parking lot by boyfriend's car and that she didn't take any meds while outside. Emphasized importance of nto leaving floor again, that she needed more antibiotics and drsg changes for her legs to heal.. Hospitalists informed.

## 2020-03-15 NOTE — ROUTINE PROCESS
END OF SHIFT NOTE: 
 
INTAKE/OUTPUT 
03/14 0701 - 03/15 0700 In: 571 [P.O.:240; I.V.:331] Out: 300 [Urine:300] Voiding: YES Catheter: NO 
Drain:   
 
 
 
 
 
Flatus: Patient does have flatus present. Stool:  0 occurrences. Characteristics: 
Stool Assessment Stool Appearance: (no stool to assess) Emesis: 0 occurrences. Characteristics: VITAL SIGNS Patient Vitals for the past 12 hrs: 
 Temp Pulse Resp BP SpO2  
03/15/20 1533 97.8 °F (36.6 °C) 68 18 128/73 96 % 03/15/20 1122 98.3 °F (36.8 °C) 90 18 121/80 97 % 03/15/20 0808 97.5 °F (36.4 °C) 98 18 126/86 99 % Pain Assessment Pain Intensity 1: 6 (03/15/20 1801) Pain Location 1: Leg 
Pain Intervention(s) 1: Medication (see MAR) Patient Stated Pain Goal: 0 Ambulating Yes Shift report given to oncoming nurse at the bedside. Karlene Peabody

## 2020-03-15 NOTE — PROGRESS NOTES
03/15/20 1135   Pain 1   Pain Scale 1 Numeric (0 - 10)   Pain Intensity 1 6   Pain Onset 1 ongoing   Pain Location 1 Leg   Pain Orientation 1 Left;Right   Pain Description 1 Aching   Pain Intervention(s) 1 Medication (see MAR)

## 2020-03-16 VITALS
DIASTOLIC BLOOD PRESSURE: 69 MMHG | HEIGHT: 67 IN | RESPIRATION RATE: 18 BRPM | WEIGHT: 148 LBS | HEART RATE: 83 BPM | OXYGEN SATURATION: 97 % | BODY MASS INDEX: 23.23 KG/M2 | TEMPERATURE: 97.8 F | SYSTOLIC BLOOD PRESSURE: 105 MMHG

## 2020-03-16 LAB
BACTERIA SPEC CULT: ABNORMAL
GRAM STN SPEC: ABNORMAL
GRAM STN SPEC: ABNORMAL
SERVICE CMNT-IMP: ABNORMAL

## 2020-03-16 PROCEDURE — 77030018836 HC SOL IRR NACL ICUM -A

## 2020-03-16 PROCEDURE — 74011250637 HC RX REV CODE- 250/637: Performed by: SURGERY

## 2020-03-16 PROCEDURE — 74011250636 HC RX REV CODE- 250/636: Performed by: HOSPITALIST

## 2020-03-16 RX ORDER — OXYCODONE AND ACETAMINOPHEN 5; 325 MG/1; MG/1
2 TABLET ORAL
Qty: 20 TAB | Refills: 0 | Status: SHIPPED | OUTPATIENT
Start: 2020-03-16 | End: 2020-03-30

## 2020-03-16 RX ORDER — CLINDAMYCIN PHOSPHATE 600 MG/50ML
600 INJECTION INTRAVENOUS EVERY 8 HOURS
Status: DISCONTINUED | OUTPATIENT
Start: 2020-03-16 | End: 2020-03-16 | Stop reason: HOSPADM

## 2020-03-16 RX ORDER — SULFAMETHOXAZOLE AND TRIMETHOPRIM 800; 160 MG/1; MG/1
1 TABLET ORAL 2 TIMES DAILY
Qty: 20 TAB | Refills: 0 | Status: SHIPPED | OUTPATIENT
Start: 2020-03-16 | End: 2020-07-11

## 2020-03-16 RX ADMIN — OXYCODONE HYDROCHLORIDE AND ACETAMINOPHEN 2 TABLET: 5; 325 TABLET ORAL at 12:04

## 2020-03-16 RX ADMIN — OXYCODONE HYDROCHLORIDE AND ACETAMINOPHEN 2 TABLET: 5; 325 TABLET ORAL at 05:17

## 2020-03-16 RX ADMIN — VANCOMYCIN HYDROCHLORIDE 1250 MG: 10 INJECTION, POWDER, LYOPHILIZED, FOR SOLUTION INTRAVENOUS at 03:22

## 2020-03-16 RX ADMIN — Medication 10 ML: at 14:11

## 2020-03-16 RX ADMIN — Medication 1 AMPULE: at 08:39

## 2020-03-16 RX ADMIN — CLINDAMYCIN PHOSPHATE 600 MG: 600 INJECTION, SOLUTION INTRAVENOUS at 14:09

## 2020-03-16 RX ADMIN — Medication 10 ML: at 04:41

## 2020-03-16 NOTE — DISCHARGE INSTRUCTIONS
Patient Education       DISCHARGE SUMMARY from Nurse    PATIENT INSTRUCTIONS:    After general anesthesia or intravenous sedation, for 24 hours or while taking prescription Narcotics:  · Limit your activities  · Do not drive and operate hazardous machinery  · Do not make important personal or business decisions  · Do  not drink alcoholic beverages  · If you have not urinated within 8 hours after discharge, please contact your surgeon on call. Report the following to your surgeon:  · Excessive pain, swelling, redness or odor of or around the surgical area  · Temperature over 100.5  · Nausea and vomiting lasting longer than 4 hours or if unable to take medications  · Any signs of decreased circulation or nerve impairment to extremity: change in color, persistent  numbness, tingling, coldness or increase pain  · Any questions    What to do at Home:  Recommended activity: Activity as tolerated      If you experience any of the following symptoms fever of 100.5 or greater, increased redness at incision sites, or drainage of pus please follow up with your surgeon. *  Please give a list of your current medications to your Primary Care Provider. *  Please update this list whenever your medications are discontinued, doses are      changed, or new medications (including over-the-counter products) are added. *  Please carry medication information at all times in case of emergency situations. These are general instructions for a healthy lifestyle:    No smoking/ No tobacco products/ Avoid exposure to second hand smoke  Surgeon General's Warning:  Quitting smoking now greatly reduces serious risk to your health.     Obesity, smoking, and sedentary lifestyle greatly increases your risk for illness    A healthy diet, regular physical exercise & weight monitoring are important for maintaining a healthy lifestyle    You may be retaining fluid if you have a history of heart failure or if you experience any of the following symptoms:  Weight gain of 3 pounds or more overnight or 5 pounds in a week, increased swelling in our hands or feet or shortness of breath while lying flat in bed. Please call your doctor as soon as you notice any of these symptoms; do not wait until your next office visit. The discharge information has been reviewed with the patient. The patient verbalized understanding. Discharge medications reviewed with the patient and appropriate educational materials and side effects teaching were provided. ___________________________________________________________________________________________________________________________________    Keep dressings and incisions clean and dry. Change both dressings daily. Pack with iodoform and cover with dry 4x4 gauze. No tub baths. No scented lotions or perfumes on sites. Skin Abscess: Care Instructions  Your Care Instructions    A skin abscess is a bacterial infection that forms a pocket of pus. A boil is a kind of skin abscess. The doctor may have cut an opening in the abscess so that the pus can drain out. You may have gauze in the cut so that the abscess will stay open and keep draining. You may need antibiotics. You will need to follow up with your doctor to make sure the infection has gone away. The doctor has checked you carefully, but problems can develop later. If you notice any problems or new symptoms, get medical treatment right away. Follow-up care is a key part of your treatment and safety. Be sure to make and go to all appointments, and call your doctor if you are having problems. It's also a good idea to know your test results and keep a list of the medicines you take. How can you care for yourself at home? · Apply warm and dry compresses, a heating pad set on low, or a hot water bottle 3 or 4 times a day for pain. Keep a cloth between the heat source and your skin. · If your doctor prescribed antibiotics, take them as directed.  Do not stop taking them just because you feel better. You need to take the full course of antibiotics. · Take pain medicines exactly as directed. ? If the doctor gave you a prescription medicine for pain, take it as prescribed. ? If you are not taking a prescription pain medicine, ask your doctor if you can take an over-the-counter medicine. · Keep your bandage clean and dry. Change the bandage whenever it gets wet or dirty, or at least one time a day. · If the abscess was packed with gauze:  ? Keep follow-up appointments to have the gauze changed or removed. If the doctor instructed you to remove the gauze, follow the instructions you were given for how to remove it. ? After the gauze is removed, soak the area in warm water for 15 to 20 minutes 2 times a day, until the wound closes. When should you call for help? Call your doctor now or seek immediate medical care if:    · You have signs of worsening infection, such as:  ? Increased pain, swelling, warmth, or redness. ? Red streaks leading from the infected skin. ? Pus draining from the wound. ? A fever.    Watch closely for changes in your health, and be sure to contact your doctor if:    · You do not get better as expected. Where can you learn more? Go to http://sheri-dennise.info/  Enter B037 in the search box to learn more about \"Skin Abscess: Care Instructions. \"  Current as of: October 30, 2019Content Version: 12.4  © 2681-7707 [employer], [employer]. Care instructions adapted under license by [employer] (which disclaims liability or warranty for this information). If you have questions about a medical condition or this instruction, always ask your healthcare professional. [place] [address] any warranty or liability for your use of this information.

## 2020-03-16 NOTE — PROGRESS NOTES
D/c instructions reviewed with patient. Patient verbalized understanding. Iv removed. Prescriptions given to patient. Declines the flu shot.

## 2020-03-16 NOTE — PROGRESS NOTES
Spoke to Ms. Mel Newberry in room 208 about Case Management and discharge planning. She admits to using IV heroin for about 3 or 4 months, and prior to that, methamphetamines. She has 3 children, but all are in court-ordered protective custody with relatives. We discussed NA and [facility], to help with heroin cessation. We also discussed [facility] [address] [employer] for PCP services. She is in agreement with trying NA or [facility], and going to [facility]. Care Management Interventions  Current Support Network:  Other, Family Lives Nearby  Discharge Location  Discharge Placement: Home

## 2020-03-16 NOTE — PROGRESS NOTES
END OF SHIFT NOTE:    INTAKE/OUTPUT  03/15 0701 - 03/16 0700  In: 360 [P.O.:360]  Out: 1900 [Urine:1900]  Voiding: YES  Catheter: NO  Drain:              Flatus: Patient does have flatus present. Stool:  0 occurrences. Characteristics:  Stool Assessment  Stool Appearance: (no stool to assess)    Emesis: 0 occurrences. Characteristics:        VITAL SIGNS  Patient Vitals for the past 12 hrs:   Temp Pulse Resp BP SpO2   03/16/20 0418 98.1 °F (36.7 °C) 81 18 113/78 97 %   03/16/20 0018 98.3 °F (36.8 °C) 79 18 120/79 99 %   03/15/20 1951 97.6 °F (36.4 °C) 91 18 108/73 97 %       Pain Assessment  Pain Intensity 1: 6 (03/16/20 0517)  Pain Location 1: Leg  Pain Intervention(s) 1: Medication (see MAR)  Patient Stated Pain Goal: 0    Ambulating  Yes  Asked pt and visitor to not smoke in room. They have complied. Drsgs dry to legs. Shift report given to oncoming nurse at the bedside.     Kim Fitzgerald RN

## 2020-03-16 NOTE — DISCHARGE SUMMARY
Physician Discharge Summary     Patient ID:  Connor Linares  [id number]  82 y.o.  1985    Admit date: 3/12/2020    Discharge date: 3-    Diagnosis:  1- Abscess both legs: wound CC grew MSSA treated with Vancomycin Iv then changed to bactrim on discharge, s/p I&D  2- Hypokalemia- replaced  3- IV drug abuse- advised in cessation. 4- Nicotine dependence-advised on cessation.     Hospital course:   37 yo female with known h/o IV drug abuse, noticed skin lesion both legs ,initially noticed like small red rash and gradually increased in size with yellow discoloration in the center,associated with pain. Pt otherwise didn't c/o fever or palpitation or sob or headache or dizziness. Patient admitted and treated as above. On day of discharge, patient exam showed packed bilateral calf surgical wounds 4x4 cm each. Pain controlled with meds. No fever. PCP: [facility], [place]    Patient Instructions:   Current Discharge Medication List      START taking these medications    Details   oxyCODONE-acetaminophen (PERCOCET) 5-325 mg per tablet Take 2 Tabs by mouth every eight (8) hours as needed for Pain for up to 14 days. Max Daily Amount: 6 Tabs. Qty: 20 Tab, Refills: 0    Associated Diagnoses: Abscess      trimethoprim-sulfamethoxazole (BACTRIM DS, SEPTRA DS) 160-800 mg per tablet Take 1 Tab by mouth two (2) times a day. Qty: 20 Tab, Refills: 0             Instructions:     Dressing change daily per nursing instructions, no recreational drugs, diet and activity as tolerated, PCP or [facility] clinic in 1 week for follow up. Condition: Stable  Time 20 min    Please send copy to primary physician.     Signed:  Talib Nicole MD  3/16/2020  2:07 PM

## 2020-03-17 LAB
BACTERIA SPEC CULT: NORMAL
GRAM STN SPEC: NORMAL
GRAM STN SPEC: NORMAL
SERVICE CMNT-IMP: NORMAL

## 2020-03-20 LAB
BACTERIA SPEC CULT: NORMAL
BACTERIA SPEC CULT: NORMAL
SERVICE CMNT-IMP: NORMAL
SERVICE CMNT-IMP: NORMAL

## 2020-07-07 ENCOUNTER — HOSPITAL ENCOUNTER (EMERGENCY)
Age: 35
Discharge: HOME | DRG: 463 | End: 2020-07-07
Attending: EMERGENCY MEDICINE
Payer: MEDICAID

## 2020-07-07 VITALS
BODY MASS INDEX: 21.19 KG/M2 | HEIGHT: 67 IN | DIASTOLIC BLOOD PRESSURE: 77 MMHG | WEIGHT: 135 LBS | SYSTOLIC BLOOD PRESSURE: 111 MMHG | HEART RATE: 118 BPM | RESPIRATION RATE: 18 BRPM | TEMPERATURE: 100.4 F | OXYGEN SATURATION: 99 %

## 2020-07-07 DIAGNOSIS — L03.116 CELLULITIS OF LEFT LOWER EXTREMITY: Primary | ICD-10-CM

## 2020-07-07 LAB
ALBUMIN SERPL-MCNC: 2.8 G/DL (ref 3.5–5)
ALBUMIN/GLOB SERPL: 0.5 {RATIO} (ref 1.2–3.5)
ALP SERPL-CCNC: 99 U/L (ref 50–136)
ALT SERPL-CCNC: 17 U/L (ref 12–65)
ANION GAP SERPL CALC-SCNC: 8 MMOL/L (ref 7–16)
AST SERPL-CCNC: 12 U/L (ref 15–37)
BASOPHILS # BLD: 0.1 K/UL (ref 0–0.2)
BASOPHILS NFR BLD: 1 % (ref 0–2)
BILIRUB SERPL-MCNC: 0.9 MG/DL (ref 0.2–1.1)
BUN SERPL-MCNC: 6 MG/DL (ref 6–23)
CALCIUM SERPL-MCNC: 9.3 MG/DL (ref 8.3–10.4)
CHLORIDE SERPL-SCNC: 98 MMOL/L (ref 98–107)
CO2 SERPL-SCNC: 28 MMOL/L (ref 21–32)
CREAT SERPL-MCNC: 0.81 MG/DL (ref 0.6–1)
DIFFERENTIAL METHOD BLD: ABNORMAL
EOSINOPHIL # BLD: 0 K/UL (ref 0–0.8)
EOSINOPHIL NFR BLD: 0 % (ref 0.5–7.8)
ERYTHROCYTE [DISTWIDTH] IN BLOOD BY AUTOMATED COUNT: 14.6 % (ref 11.9–14.6)
GLOBULIN SER CALC-MCNC: 6 G/DL (ref 2.3–3.5)
GLUCOSE SERPL-MCNC: 84 MG/DL (ref 65–100)
HCT VFR BLD AUTO: 40.4 % (ref 35.8–46.3)
HGB BLD-MCNC: 12.3 G/DL (ref 11.7–15.4)
IMM GRANULOCYTES # BLD AUTO: 0.1 K/UL (ref 0–0.5)
IMM GRANULOCYTES NFR BLD AUTO: 1 % (ref 0–5)
LACTATE SERPL-SCNC: 1.8 MMOL/L (ref 0.4–2)
LYMPHOCYTES # BLD: 2.4 K/UL (ref 0.5–4.6)
LYMPHOCYTES NFR BLD: 16 % (ref 13–44)
MCH RBC QN AUTO: 26.7 PG (ref 26.1–32.9)
MCHC RBC AUTO-ENTMCNC: 30.4 G/DL (ref 31.4–35)
MCV RBC AUTO: 87.8 FL (ref 79.6–97.8)
MONOCYTES # BLD: 1 K/UL (ref 0.1–1.3)
MONOCYTES NFR BLD: 7 % (ref 4–12)
NEUTS SEG # BLD: 11.5 K/UL (ref 1.7–8.2)
NEUTS SEG NFR BLD: 76 % (ref 43–78)
NRBC # BLD: 0 K/UL (ref 0–0.2)
PLATELET # BLD AUTO: 334 K/UL (ref 150–450)
PMV BLD AUTO: 9.5 FL (ref 9.4–12.3)
POTASSIUM SERPL-SCNC: 4 MMOL/L (ref 3.5–5.1)
PROT SERPL-MCNC: 8.8 G/DL (ref 6.3–8.2)
RBC # BLD AUTO: 4.6 M/UL (ref 4.05–5.2)
SODIUM SERPL-SCNC: 134 MMOL/L (ref 136–145)
WBC # BLD AUTO: 15 K/UL (ref 4.3–11.1)

## 2020-07-07 PROCEDURE — 74011250636 HC RX REV CODE- 250/636: Performed by: EMERGENCY MEDICINE

## 2020-07-07 PROCEDURE — 87077 CULTURE AEROBIC IDENTIFY: CPT

## 2020-07-07 PROCEDURE — 87205 SMEAR GRAM STAIN: CPT

## 2020-07-07 PROCEDURE — 99282 EMERGENCY DEPT VISIT SF MDM: CPT

## 2020-07-07 PROCEDURE — 85025 COMPLETE CBC W/AUTO DIFF WBC: CPT

## 2020-07-07 PROCEDURE — 83605 ASSAY OF LACTIC ACID: CPT

## 2020-07-07 PROCEDURE — 80053 COMPREHEN METABOLIC PANEL: CPT

## 2020-07-07 PROCEDURE — 87186 SC STD MICRODIL/AGAR DIL: CPT

## 2020-07-07 PROCEDURE — 87040 BLOOD CULTURE FOR BACTERIA: CPT

## 2020-07-07 PROCEDURE — 96365 THER/PROPH/DIAG IV INF INIT: CPT

## 2020-07-07 PROCEDURE — 87150 DNA/RNA AMPLIFIED PROBE: CPT

## 2020-07-07 RX ORDER — CLINDAMYCIN HYDROCHLORIDE 300 MG/1
300 CAPSULE ORAL 4 TIMES DAILY
Qty: 28 CAP | Refills: 0 | Status: SHIPPED | OUTPATIENT
Start: 2020-07-07 | End: 2020-07-11

## 2020-07-07 RX ORDER — CLINDAMYCIN PHOSPHATE 600 MG/50ML
600 INJECTION INTRAVENOUS
Status: COMPLETED | OUTPATIENT
Start: 2020-07-07 | End: 2020-07-07

## 2020-07-07 RX ADMIN — SODIUM CHLORIDE 1000 ML: 9 INJECTION, SOLUTION INTRAVENOUS at 15:54

## 2020-07-07 RX ADMIN — CLINDAMYCIN PHOSPHATE 600 MG: 600 INJECTION, SOLUTION INTRAVENOUS at 15:54

## 2020-07-07 NOTE — ED PROVIDER NOTES
81-year-old female with history of IV heroin and methamphetamine abuse presents with nonhealing wounds on her left leg. She states the wounds have been present for over 2 months. She has been on antibiotics with minimal improvement. She has had subjective fever. Some nausea. No vomiting. The history is provided by the patient. Skin Problem           No past medical history on file. No past surgical history on file. No family history on file. Social History     Socioeconomic History    Marital status: SINGLE     Spouse name: Not on file    Number of children: Not on file    Years of education: Not on file    Highest education level: Not on file   Occupational History    Not on file   Social Needs    Financial resource strain: Not on file    Food insecurity     Worry: Not on file     Inability: Not on file    Transportation needs     Medical: Not on file     Non-medical: Not on file   Tobacco Use    Smoking status: Current Every Day Smoker     Packs/day: 1.00   Substance and Sexual Activity    Alcohol use: No    Drug use: No    Sexual activity: Not on file   Lifestyle    Physical activity     Days per week: Not on file     Minutes per session: Not on file    Stress: Not on file   Relationships    Social connections     Talks on phone: Not on file     Gets together: Not on file     Attends Mu-ism service: Not on file     Active member of club or organization: Not on file     Attends meetings of clubs or organizations: Not on file     Relationship status: Not on file    Intimate partner violence     Fear of current or ex partner: Not on file     Emotionally abused: Not on file     Physically abused: Not on file     Forced sexual activity: Not on file   Other Topics Concern    Not on file   Social History Narrative    Not on file         ALLERGIES: Patient has no known allergies. Review of Systems   Constitutional: Positive for chills and fever.    HENT: Negative for congestion. Respiratory: Negative for cough and shortness of breath. Cardiovascular: Negative for chest pain. Gastrointestinal: Positive for nausea. Negative for abdominal pain and vomiting. Musculoskeletal: Negative for back pain and neck pain. Skin: Positive for rash. Neurological: Negative for headaches. Vitals:    07/07/20 1457   BP: 111/77   Pulse: (!) 118   Resp: 18   Temp: 100.4 °F (38 °C)   SpO2: 99%   Weight: 61.2 kg (135 lb)   Height: 5' 7\" (1.702 m)            Physical Exam  Vitals signs and nursing note reviewed. Constitutional:       General: She is not in acute distress. Appearance: Normal appearance. She is not toxic-appearing. HENT:      Head: Normocephalic and atraumatic. Mouth/Throat:      Mouth: Mucous membranes are moist.   Eyes:      Pupils: Pupils are equal, round, and reactive to light. Neck:      Musculoskeletal: Normal range of motion. Cardiovascular:      Rate and Rhythm: Tachycardia present. Pulmonary:      Effort: Pulmonary effort is normal.   Skin:     General: Skin is warm and dry. Comments: 2 lesions medial aspect of left calf both approximately 3 cm in diameter with surrounding erythema and skin breakdown. Subcutaneous tissue exposed. No palpable abscess. Distal pulses and movement. Neurological:      Mental Status: She is alert and oriented to person, place, and time. Psychiatric:         Mood and Affect: Mood normal.         Behavior: Behavior normal.          MDM  Number of Diagnoses or Management Options  Diagnosis management comments: Lab work shows mild leukocytosis but normal lactic acid. Patient has been hydrated with normal saline and treated with IV clindamycin. She does have a wounds with suspected cellulitis but no definite abscess. Will discharge home on clindamycin and will give referral to the wound center.        Amount and/or Complexity of Data Reviewed  Clinical lab tests: ordered and reviewed  Tests in the medicine section of CPT®: ordered and reviewed    Risk of Complications, Morbidity, and/or Mortality  Presenting problems: low  Diagnostic procedures: low  Management options: low           Procedures

## 2020-07-07 NOTE — DISCHARGE INSTRUCTIONS

## 2020-07-07 NOTE — ED NOTES
I have reviewed discharge instructions with the patient. The patient verbalized understanding. Patient left ED via Discharge Method: ambulatory to Home with self. Opportunity for questions and clarification provided. Patient given 1 scripts. To continue your aftercare when you leave the hospital, you may receive an automated call from our care team to check in on how you are doing. This is a free service and part of our promise to provide the best care and service to meet your aftercare needs.  If you have questions, or wish to unsubscribe from this service please call [phone]. Thank you for Choosing our [facility] Emergency Department.

## 2020-07-07 NOTE — ED TRIAGE NOTES
Pt complains of two open wounds on her left leg. States she is injecting heroin and meth in her legs. States she has seen surgery for this problem.

## 2020-07-07 NOTE — LETTER
[facility] EMERGENCY DEPT 
[address] [facility] [address] 
[phone] Work/School Note Date: 7/7/2020 To Whom It May concern: 
 
Deana Tellez was seen and treated today in the emergency room by the following provider(s): 
Attending Provider: Luis F Alexander MD. Deana Tellez was seen in the Emergency Room today, [phone] from [phone]. Sincerely, Radhika Thacker RN

## 2020-07-08 LAB
ACC. NO. FROM MICRO ORDER, ACCP: ABNORMAL
ESCHERICHIA COLI: DETECTED
INTERPRETATION: ABNORMAL
KPC (CARBAPENEM RESISTANCE GENE): NOT DETECTED

## 2020-07-08 NOTE — PROGRESS NOTES
Placed on Bactrim and Clindamycin at home. Called patient regarding results. Left VM to return call ASAP.

## 2020-07-08 NOTE — PROGRESS NOTES
No alternate number. Will see about sending out law enforcement to have patient return due to positive blood culture. Spoke with Starla Aldrich, RN, charge nurse regarding this.

## 2020-07-09 ENCOUNTER — HOSPITAL ENCOUNTER (INPATIENT)
Age: 35
LOS: 2 days | Discharge: HOME | DRG: 463 | End: 2020-07-11
Attending: EMERGENCY MEDICINE | Admitting: INTERNAL MEDICINE
Payer: MEDICAID

## 2020-07-09 ENCOUNTER — APPOINTMENT (OUTPATIENT)
Dept: ULTRASOUND IMAGING | Age: 35
DRG: 463 | End: 2020-07-09
Attending: INTERNAL MEDICINE
Payer: MEDICAID

## 2020-07-09 ENCOUNTER — APPOINTMENT (OUTPATIENT)
Dept: GENERAL RADIOLOGY | Age: 35
DRG: 463 | End: 2020-07-09
Attending: EMERGENCY MEDICINE
Payer: MEDICAID

## 2020-07-09 DIAGNOSIS — R78.81 GRAM-NEGATIVE BACTEREMIA: Primary | ICD-10-CM

## 2020-07-09 DIAGNOSIS — N39.0 URINARY TRACT INFECTION WITHOUT HEMATURIA, SITE UNSPECIFIED: ICD-10-CM

## 2020-07-09 DIAGNOSIS — F19.10 INTRAVENOUS DRUG ABUSE (HCC): ICD-10-CM

## 2020-07-09 PROBLEM — O99.320 IVDA (INTRAVENOUS DRUG ABUSE) COMPLICATING PREGNANCY (HCC): Status: RESOLVED | Noted: 2020-07-09 | Resolved: 2020-07-09

## 2020-07-09 PROBLEM — O99.320 IVDA (INTRAVENOUS DRUG ABUSE) COMPLICATING PREGNANCY (HCC): Status: ACTIVE | Noted: 2020-07-09

## 2020-07-09 PROBLEM — B96.20 E COLI BACTEREMIA: Status: ACTIVE | Noted: 2020-07-09

## 2020-07-09 LAB
ALBUMIN SERPL-MCNC: 2.5 G/DL (ref 3.5–5)
ALBUMIN/GLOB SERPL: 0.4 {RATIO} (ref 1.2–3.5)
ALP SERPL-CCNC: 90 U/L (ref 50–136)
ALT SERPL-CCNC: 18 U/L (ref 12–65)
AMPHET UR QL SCN: POSITIVE
ANION GAP SERPL CALC-SCNC: 6 MMOL/L (ref 7–16)
APPEARANCE UR: ABNORMAL
AST SERPL-CCNC: 31 U/L (ref 15–37)
ATRIAL RATE: 76 BPM
BACTERIA URNS QL MICRO: ABNORMAL /HPF
BARBITURATES UR QL SCN: NEGATIVE
BASOPHILS # BLD: 0.1 K/UL (ref 0–0.2)
BASOPHILS NFR BLD: 1 % (ref 0–2)
BENZODIAZ UR QL: NEGATIVE
BILIRUB SERPL-MCNC: 0.7 MG/DL (ref 0.2–1.1)
BILIRUB UR QL: NEGATIVE
BUN SERPL-MCNC: 8 MG/DL (ref 6–23)
CALCIUM SERPL-MCNC: 8.5 MG/DL (ref 8.3–10.4)
CALCULATED P AXIS, ECG09: -58 DEGREES
CALCULATED R AXIS, ECG10: 4 DEGREES
CALCULATED T AXIS, ECG11: 55 DEGREES
CANNABINOIDS UR QL SCN: NEGATIVE
CASTS URNS QL MICRO: ABNORMAL /LPF
CHLORIDE SERPL-SCNC: 103 MMOL/L (ref 98–107)
CO2 SERPL-SCNC: 27 MMOL/L (ref 21–32)
COCAINE UR QL SCN: POSITIVE
COLOR UR: YELLOW
CREAT SERPL-MCNC: 0.78 MG/DL (ref 0.6–1)
CRP SERPL-MCNC: 17.5 MG/DL (ref 0–0.9)
DIAGNOSIS, 93000: NORMAL
DIFFERENTIAL METHOD BLD: ABNORMAL
EOSINOPHIL # BLD: 0.1 K/UL (ref 0–0.8)
EOSINOPHIL NFR BLD: 2 % (ref 0.5–7.8)
EPI CELLS #/AREA URNS HPF: ABNORMAL /HPF
ERYTHROCYTE [DISTWIDTH] IN BLOOD BY AUTOMATED COUNT: 14.6 % (ref 11.9–14.6)
GLOBULIN SER CALC-MCNC: 5.7 G/DL (ref 2.3–3.5)
GLUCOSE SERPL-MCNC: 90 MG/DL (ref 65–100)
GLUCOSE UR STRIP.AUTO-MCNC: NEGATIVE MG/DL
HCT VFR BLD AUTO: 34.2 % (ref 35.8–46.3)
HGB BLD-MCNC: 10.7 G/DL (ref 11.7–15.4)
HGB UR QL STRIP: ABNORMAL
IMM GRANULOCYTES # BLD AUTO: 0 K/UL (ref 0–0.5)
IMM GRANULOCYTES NFR BLD AUTO: 0 % (ref 0–5)
KETONES UR QL STRIP.AUTO: NEGATIVE MG/DL
LACTATE SERPL-SCNC: 1 MMOL/L (ref 0.4–2)
LEUKOCYTE ESTERASE UR QL STRIP.AUTO: ABNORMAL
LYMPHOCYTES # BLD: 2.2 K/UL (ref 0.5–4.6)
LYMPHOCYTES NFR BLD: 28 % (ref 13–44)
MCH RBC QN AUTO: 27.4 PG (ref 26.1–32.9)
MCHC RBC AUTO-ENTMCNC: 31.3 G/DL (ref 31.4–35)
MCV RBC AUTO: 87.7 FL (ref 79.6–97.8)
METHADONE UR QL: NEGATIVE
MONOCYTES # BLD: 0.5 K/UL (ref 0.1–1.3)
MONOCYTES NFR BLD: 7 % (ref 4–12)
NEUTS SEG # BLD: 4.8 K/UL (ref 1.7–8.2)
NEUTS SEG NFR BLD: 62 % (ref 43–78)
NITRITE UR QL STRIP.AUTO: POSITIVE
NRBC # BLD: 0 K/UL (ref 0–0.2)
OPIATES UR QL: NEGATIVE
P-R INTERVAL, ECG05: 122 MS
PCP UR QL: NEGATIVE
PH UR STRIP: 6 [PH] (ref 5–9)
PLATELET # BLD AUTO: 333 K/UL (ref 150–450)
PMV BLD AUTO: 10 FL (ref 9.4–12.3)
POTASSIUM SERPL-SCNC: 4.3 MMOL/L (ref 3.5–5.1)
PROCALCITONIN SERPL-MCNC: 0.29 NG/ML
PROT SERPL-MCNC: 8.2 G/DL (ref 6.3–8.2)
PROT UR STRIP-MCNC: ABNORMAL MG/DL
Q-T INTERVAL, ECG07: 384 MS
QRS DURATION, ECG06: 88 MS
QTC CALCULATION (BEZET), ECG08: 432 MS
RBC # BLD AUTO: 3.9 M/UL (ref 4.05–5.2)
RBC #/AREA URNS HPF: ABNORMAL /HPF
SODIUM SERPL-SCNC: 136 MMOL/L (ref 136–145)
SP GR UR REFRACTOMETRY: 1.01 (ref 1–1.02)
UROBILINOGEN UR QL STRIP.AUTO: 1 EU/DL (ref 0.2–1)
VENTRICULAR RATE, ECG03: 76 BPM
WBC # BLD AUTO: 7.7 K/UL (ref 4.3–11.1)
WBC URNS QL MICRO: ABNORMAL /HPF

## 2020-07-09 PROCEDURE — 99284 EMERGENCY DEPT VISIT MOD MDM: CPT

## 2020-07-09 PROCEDURE — 76770 US EXAM ABDO BACK WALL COMP: CPT

## 2020-07-09 PROCEDURE — 85025 COMPLETE CBC W/AUTO DIFF WBC: CPT

## 2020-07-09 PROCEDURE — 87186 SC STD MICRODIL/AGAR DIL: CPT

## 2020-07-09 PROCEDURE — 74011000258 HC RX REV CODE- 258: Performed by: EMERGENCY MEDICINE

## 2020-07-09 PROCEDURE — 74011250637 HC RX REV CODE- 250/637: Performed by: FAMILY MEDICINE

## 2020-07-09 PROCEDURE — 74011250637 HC RX REV CODE- 250/637: Performed by: INTERNAL MEDICINE

## 2020-07-09 PROCEDURE — 87088 URINE BACTERIA CULTURE: CPT

## 2020-07-09 PROCEDURE — 80307 DRUG TEST PRSMV CHEM ANLYZR: CPT

## 2020-07-09 PROCEDURE — 81001 URINALYSIS AUTO W/SCOPE: CPT

## 2020-07-09 PROCEDURE — 83605 ASSAY OF LACTIC ACID: CPT

## 2020-07-09 PROCEDURE — 80053 COMPREHEN METABOLIC PANEL: CPT

## 2020-07-09 PROCEDURE — 93005 ELECTROCARDIOGRAM TRACING: CPT | Performed by: EMERGENCY MEDICINE

## 2020-07-09 PROCEDURE — 65270000029 HC RM PRIVATE

## 2020-07-09 PROCEDURE — 71045 X-RAY EXAM CHEST 1 VIEW: CPT

## 2020-07-09 PROCEDURE — 87040 BLOOD CULTURE FOR BACTERIA: CPT

## 2020-07-09 PROCEDURE — 81003 URINALYSIS AUTO W/O SCOPE: CPT

## 2020-07-09 PROCEDURE — 86140 C-REACTIVE PROTEIN: CPT

## 2020-07-09 PROCEDURE — 84145 PROCALCITONIN (PCT): CPT

## 2020-07-09 PROCEDURE — 87086 URINE CULTURE/COLONY COUNT: CPT

## 2020-07-09 PROCEDURE — 96374 THER/PROPH/DIAG INJ IV PUSH: CPT

## 2020-07-09 PROCEDURE — 74011250636 HC RX REV CODE- 250/636: Performed by: INTERNAL MEDICINE

## 2020-07-09 PROCEDURE — 74011250636 HC RX REV CODE- 250/636: Performed by: EMERGENCY MEDICINE

## 2020-07-09 RX ORDER — DIPHENHYDRAMINE HCL 25 MG
25 CAPSULE ORAL
Status: DISCONTINUED | OUTPATIENT
Start: 2020-07-09 | End: 2020-07-11 | Stop reason: HOSPADM

## 2020-07-09 RX ORDER — IBUPROFEN 200 MG
1 TABLET ORAL EVERY 24 HOURS
Status: DISCONTINUED | OUTPATIENT
Start: 2020-07-09 | End: 2020-07-09

## 2020-07-09 RX ORDER — HEPARIN SODIUM 5000 [USP'U]/ML
5000 INJECTION, SOLUTION INTRAVENOUS; SUBCUTANEOUS EVERY 8 HOURS
Status: DISCONTINUED | OUTPATIENT
Start: 2020-07-09 | End: 2020-07-11 | Stop reason: HOSPADM

## 2020-07-09 RX ORDER — ONDANSETRON 2 MG/ML
4 INJECTION INTRAMUSCULAR; INTRAVENOUS
Status: DISCONTINUED | OUTPATIENT
Start: 2020-07-09 | End: 2020-07-11 | Stop reason: HOSPADM

## 2020-07-09 RX ORDER — ACETAMINOPHEN 325 MG/1
650 TABLET ORAL
Status: DISCONTINUED | OUTPATIENT
Start: 2020-07-09 | End: 2020-07-11 | Stop reason: HOSPADM

## 2020-07-09 RX ORDER — IBUPROFEN 200 MG
1 TABLET ORAL EVERY 24 HOURS
Status: DISCONTINUED | OUTPATIENT
Start: 2020-07-09 | End: 2020-07-11 | Stop reason: HOSPADM

## 2020-07-09 RX ADMIN — HEPARIN SODIUM 5000 UNITS: 5000 INJECTION INTRAVENOUS; SUBCUTANEOUS at 21:10

## 2020-07-09 RX ADMIN — CEFTRIAXONE SODIUM 1 G: 1 INJECTION, POWDER, FOR SOLUTION INTRAMUSCULAR; INTRAVENOUS at 11:45

## 2020-07-09 RX ADMIN — SODIUM CHLORIDE 1000 ML: 9 INJECTION, SOLUTION INTRAVENOUS at 11:45

## 2020-07-09 RX ADMIN — HEPARIN SODIUM 5000 UNITS: 5000 INJECTION INTRAVENOUS; SUBCUTANEOUS at 14:19

## 2020-07-09 NOTE — ED PROVIDER NOTES
Patient was contacted and requested to return the emerge department for reevaluation due to blood culture positive for gram-negative bacilli. She was seen in the emergency room in 2 days ago for chronic ulcers on her left lower extremity secondary to IV drug use. She reports she has continued to use, and used within the last 24 hours. She states she injected into her left arm a combination of heroin and methamphetamine. She reports chills and diaphoresis and rigors. She was prescribed clindamycin to take orally, but has not filled or taken any of the prescription. The history is provided by the patient and medical records. Abnormal Lab Results   This is a new problem. The current episode started 2 days ago. The problem occurs constantly. The problem has not changed since onset. Nothing aggravates the symptoms. Nothing relieves the symptoms. She has tried nothing for the symptoms. The treatment provided no relief. History reviewed. No pertinent past medical history. History reviewed. No pertinent surgical history. History reviewed. No pertinent family history.     Social History     Socioeconomic History    Marital status: SINGLE     Spouse name: Not on file    Number of children: Not on file    Years of education: Not on file    Highest education level: Not on file   Occupational History    Not on file   Social Needs    Financial resource strain: Not on file    Food insecurity     Worry: Not on file     Inability: Not on file    Transportation needs     Medical: Not on file     Non-medical: Not on file   Tobacco Use    Smoking status: Current Every Day Smoker     Packs/day: 1.00   Substance and Sexual Activity    Alcohol use: No    Drug use: No    Sexual activity: Not on file   Lifestyle    Physical activity     Days per week: Not on file     Minutes per session: Not on file    Stress: Not on file   Relationships    Social connections     Talks on phone: Not on file     Gets together: Not on file     Attends Rastafarian service: Not on file     Active member of club or organization: Not on file     Attends meetings of clubs or organizations: Not on file     Relationship status: Not on file    Intimate partner violence     Fear of current or ex partner: Not on file     Emotionally abused: Not on file     Physically abused: Not on file     Forced sexual activity: Not on file   Other Topics Concern    Not on file   Social History Narrative    Not on file         ALLERGIES: Patient has no known allergies. Review of Systems   Constitutional: Positive for chills, diaphoresis, fatigue and fever. All other systems reviewed and are negative. Vitals:    07/09/20 1019   BP: 125/74   Pulse: (!) 104   Resp: 16   Temp: 98.2 °F (36.8 °C)   SpO2: 95%   Weight: 61.2 kg (135 lb)   Height: 5' 7\" (1.702 m)            Physical Exam  Vitals signs and nursing note reviewed. Constitutional:       General: She is not in acute distress. Appearance: She is not ill-appearing, toxic-appearing or diaphoretic. Comments: Disheveled   HENT:      Head: Normocephalic and atraumatic. Right Ear: Tympanic membrane normal.      Left Ear: Tympanic membrane normal.      Nose: Nose normal.   Eyes:      Extraocular Movements: Extraocular movements intact. Conjunctiva/sclera: Conjunctivae normal.      Pupils: Pupils are equal, round, and reactive to light. Neck:      Musculoskeletal: Normal range of motion and neck supple. Cardiovascular:      Rate and Rhythm: Regular rhythm. Tachycardia present. Pulses: Normal pulses. Heart sounds: Normal heart sounds. Comments: No murmur or rub noted  Pulmonary:      Effort: Pulmonary effort is normal.      Breath sounds: Normal breath sounds. Abdominal:      Palpations: Abdomen is soft. Tenderness: There is no abdominal tenderness. Musculoskeletal: Normal range of motion. General: No swelling or tenderness.    Lymphadenopathy: Cervical: No cervical adenopathy. Skin:     General: Skin is warm and dry. Capillary Refill: Capillary refill takes less than 2 seconds. Comments: 2 ulcerations noted to the left lower extremity on the medial aspect of the leg. No evidence of cellulitis noted. Neurological:      General: No focal deficit present. Mental Status: She is alert and oriented to person, place, and time. Mental status is at baseline. Psychiatric:         Mood and Affect: Mood normal.         Behavior: Behavior normal.         Thought Content:  Thought content normal.          MDM  Number of Diagnoses or Management Options     Amount and/or Complexity of Data Reviewed  Clinical lab tests: ordered and reviewed  Tests in the radiology section of CPT®: ordered and reviewed  Review and summarize past medical records: yes  Discuss the patient with other providers: yes  Independent visualization of images, tracings, or specimens: yes    Risk of Complications, Morbidity, and/or Mortality  Presenting problems: high  Diagnostic procedures: moderate  Management options: moderate    Patient Progress  Patient progress: stable         Procedures

## 2020-07-09 NOTE — H&P
History and Physical    Patient: Sherlyn Baker MRN: [id number]  SSN: [id number]    YOB: 1985  Age: 36 y.o. Sex: female      Subjective: CC: I Have body aches\"      Sherlyn Baker is a 36 y.o. female , homeless, who has no significant PMH but IVDA and smoking, who was called from [address] [place] [address] after her [facility] turned positive for G-R on 7/7/20. The patient visited the ED for left leg ulcers and cellulitis, which are chronic, she was given po clindamycin but she never filled the prescription. She has complained of body aches, fever, chills and rigors. She admitted using cocaine, amphetamines, last yesterday. The patient also has right lower back pain and dysuria. She was found in no distress. VS: stable    Labs: no leukocytosis    UA: wbc   Chemistry: unrevealing  PCT: 0.29  CRP: 17  CXR: unrevealing  UDS: positive for cocaine and amphetamines  EKG: nst, normal qtc    BC from 7/7: e. Coli    The patient received NS and rocephin., Hospitalist has been contacted for admission     History reviewed. No pertinent past medical history. History reviewed. No pertinent surgical history. History reviewed. No pertinent family history. Social History     Tobacco Use    Smoking status: Current Every Day Smoker     Packs/day: 1.00   Substance Use Topics    Alcohol use: No      Prior to Admission medications    Medication Sig Start Date End Date Taking? Authorizing Provider   clindamycin (CLEOCIN) 300 mg capsule Take 1 Cap by mouth four (4) times daily for 7 days. 7/7/20 7/14/20  Deepak Collazo MD   trimethoprim-sulfamethoxazole (BACTRIM DS, SEPTRA DS) 160-800 mg per tablet Take 1 Tab by mouth two (2) times a day. 3/16/20   Dontrell Perdue MD        No Known Allergies    Review of Systems:  A comprehensive review of systems was negative except for that written in the History of Present Illness.     Objective:     Vitals:    07/09/20 1019 07/09/20 1145 07/09/20 1241 07/09/20 1316   BP: 125/74 114/62 100/57 95/61   Pulse: (!) 104 89 82 87   Resp: 16 16 16 16   Temp: 98.2 °F (36.8 °C)   97.9 °F (36.6 °C)   SpO2: 95% 98% 98% 100%   Weight: 61.2 kg (135 lb)      Height: 5' 7\" (1.702 m)           Physical Exam:  GENERAL: alert, cooperative, no distress, appears stated age  EYE: negative  LYMPHATIC: Cervical, supraclavicular, and axillary nodes normal.   THROAT & NECK: normal and no erythema or exudates noted. LUNG: clear to auscultation bilaterally  HEART: regular rate and rhythm, S1, S2 normal, no murmur, click, rub or gallop  ABDOMEN: soft, non-tender.  Bowel sounds normal. No masses,  no organomegaly  EXTREMITIES:  Left leg with chronic ulcers, non purulence, mild erythema   SKIN: Normal.  NEUROLOGIC: negative  PSYCHIATRIC: non focal    Assessment:     Active Hospital Problems    Diagnosis Date Noted    UTI (urinary tract infection) 07/09/2020    E coli bacteremia 07/09/2020    IV drug abuse ([facility] [address] 03/12/2020       Plan:   -Gram negative bacteremia due to E coli  Possible source is UTI  She also has chronic ulcers over the left leg    Will admit as inpatient on iv antibiotics awaiting 48-72 hrs fever free and with no signs of leukocytosis  Continue iv rocephin  Tylenol if fever  Monitor BC  U culture  Check renal Us to R/O stones    -IVDA: counseling given n cessation  Monitor    -Smoker: cessation encouraged  Start nicotine patch    DVT ppx: heparin sq    Code status: full    Estimated LOS > 2MN  Risk: high  Estimated DC planning: home  Goals of care discussed with the patient     Signed By: Marianela Toribio MD     July 9, 2020

## 2020-07-09 NOTE — ED NOTES
TRANSFER - OUT REPORT:    Verbal report given to [facility] RN (name) on Christine Ladd  being transferred to   (unit) for routine progression of care       Report consisted of patients Situation, Background, Assessment and   Recommendations(SBAR). Information from the following report(s) SBAR was reviewed with the receiving nurse. Lines:   Peripheral IV 07/09/20 Right Forearm (Active)   Site Assessment Clean, dry, & intact 07/09/20 1232   Phlebitis Assessment 0 07/09/20 1232   Infiltration Assessment 0 07/09/20 1232   Dressing Status Clean, dry, & intact 07/09/20 1232        Opportunity for questions and clarification was provided.       Patient transported with:   [facility]

## 2020-07-09 NOTE — ED TRIAGE NOTES
Pt reports she was called yesterday and told she needed to come back due to positive blood culture results. Pt states her leg has not gotten worse. NAD. Masked. Also states \"kidney pain\" for about a week. Had it prior to coming in for the leg cellulitis.

## 2020-07-09 NOTE — ED NOTES
Pt resting on stretcher and waiting for hospitalist evaluation. Report given to keke and pt remains on monitors x3.

## 2020-07-09 NOTE — ED NOTES
90 DAY/ Lisinopril 20mg Report from [facility]. Pt care transferred at this time.

## 2020-07-09 NOTE — PROGRESS NOTES
07/09/20 1601   Dual Skin Pressure Injury Assessment   Dual Skin Pressure Injury Assessment X   Second Care Provider (Based on [address] [place] [address]) Mulu Police RN   Skin Integumentary   Skin Integumentary (WDL) X    Pressure  Injury Documentation No Pressure Injury Noted-Pressure Ulcer Prevention Initiated   Skin Color Appropriate for ethnicity   Skin Condition/Temp Warm;Dry   Skin Integrity Tattoos (comment)  (Generalized tattoos. Sun burn rash)   Turgor Epidermis thin w/ loss of subcut tissue   Hair Growth Absent   Nails WDL   Varicosities Absent   Wound Prevention and Protection Methods   Orientation of Wound Prevention Left; Lower   Location of Wound Prevention Leg   Dressing Present  Yes; Intact, not due to be changed   Dressing Status Intact   Wound Offloading (Prevention Methods) Bed, pressure redistribution/air

## 2020-07-09 NOTE — ED NOTES
Pt resting in bed. No acute distress noted. No needs expressed at this time. Will continue to monitor.

## 2020-07-09 NOTE — PROGRESS NOTES
07/09/20 1601   Dual Skin Pressure Injury Assessment   Dual Skin Pressure Injury Assessment X   Second Care Provider (Based on [address] [place] [address]) Geovanni Schwartz RN   Skin Integumentary   Skin Integumentary (WDL) X    Pressure  Injury Documentation No Pressure Injury Noted-Pressure Ulcer Prevention Initiated   Skin Color Appropriate for ethnicity   Skin Condition/Temp Warm;Dry   Skin Integrity Tattoos (comment)  (Generalized tattoos. Sun burn rash)   Turgor Epidermis thin w/ loss of subcut tissue   Hair Growth Absent   Nails WDL   Varicosities Absent   Wound Prevention and Protection Methods   Orientation of Wound Prevention Left; Lower   Location of Wound Prevention Leg   Dressing Present  Yes; Intact, not due to be changed   Dressing Status Intact   Wound Offloading (Prevention Methods) Bed, pressure redistribution/air   Patient has sunburn rash to upper chest and upper arms. Two open ulcers to LLE. Scattered tattoos. Poor dentition.

## 2020-07-10 LAB
ANION GAP SERPL CALC-SCNC: 6 MMOL/L (ref 7–16)
BASOPHILS # BLD: 0.1 K/UL (ref 0–0.2)
BASOPHILS NFR BLD: 1 % (ref 0–2)
BUN SERPL-MCNC: 7 MG/DL (ref 6–23)
CALCIUM SERPL-MCNC: 8.8 MG/DL (ref 8.3–10.4)
CHLORIDE SERPL-SCNC: 103 MMOL/L (ref 98–107)
CO2 SERPL-SCNC: 30 MMOL/L (ref 21–32)
CREAT SERPL-MCNC: 0.68 MG/DL (ref 0.6–1)
DIFFERENTIAL METHOD BLD: NORMAL
EOSINOPHIL # BLD: 0.1 K/UL (ref 0–0.8)
EOSINOPHIL NFR BLD: 3 % (ref 0.5–7.8)
ERYTHROCYTE [DISTWIDTH] IN BLOOD BY AUTOMATED COUNT: 14.6 % (ref 11.9–14.6)
GLUCOSE SERPL-MCNC: 75 MG/DL (ref 65–100)
HCT VFR BLD AUTO: 38.3 % (ref 35.8–46.3)
HGB BLD-MCNC: 12.3 G/DL (ref 11.7–15.4)
IMM GRANULOCYTES # BLD AUTO: 0 K/UL (ref 0–0.5)
IMM GRANULOCYTES NFR BLD AUTO: 1 % (ref 0–5)
LYMPHOCYTES # BLD: 1.7 K/UL (ref 0.5–4.6)
LYMPHOCYTES NFR BLD: 31 % (ref 13–44)
MCH RBC QN AUTO: 28.3 PG (ref 26.1–32.9)
MCHC RBC AUTO-ENTMCNC: 32.1 G/DL (ref 31.4–35)
MCV RBC AUTO: 88 FL (ref 79.6–97.8)
MONOCYTES # BLD: 0.3 K/UL (ref 0.1–1.3)
MONOCYTES NFR BLD: 6 % (ref 4–12)
NEUTS SEG # BLD: 3.1 K/UL (ref 1.7–8.2)
NEUTS SEG NFR BLD: 59 % (ref 43–78)
NRBC # BLD: 0 K/UL (ref 0–0.2)
PLATELET # BLD AUTO: 382 K/UL (ref 150–450)
PMV BLD AUTO: 9.4 FL (ref 9.4–12.3)
POTASSIUM SERPL-SCNC: 3.6 MMOL/L (ref 3.5–5.1)
RBC # BLD AUTO: 4.35 M/UL (ref 4.05–5.2)
SODIUM SERPL-SCNC: 139 MMOL/L (ref 136–145)
WBC # BLD AUTO: 5.3 K/UL (ref 4.3–11.1)

## 2020-07-10 PROCEDURE — 36415 COLL VENOUS BLD VENIPUNCTURE: CPT

## 2020-07-10 PROCEDURE — 74011250637 HC RX REV CODE- 250/637: Performed by: INTERNAL MEDICINE

## 2020-07-10 PROCEDURE — 74011250637 HC RX REV CODE- 250/637: Performed by: FAMILY MEDICINE

## 2020-07-10 PROCEDURE — 74011000258 HC RX REV CODE- 258: Performed by: INTERNAL MEDICINE

## 2020-07-10 PROCEDURE — 65270000029 HC RM PRIVATE

## 2020-07-10 PROCEDURE — 85025 COMPLETE CBC W/AUTO DIFF WBC: CPT

## 2020-07-10 PROCEDURE — 80048 BASIC METABOLIC PNL TOTAL CA: CPT

## 2020-07-10 PROCEDURE — 74011250636 HC RX REV CODE- 250/636: Performed by: INTERNAL MEDICINE

## 2020-07-10 RX ADMIN — HEPARIN SODIUM 5000 UNITS: 5000 INJECTION INTRAVENOUS; SUBCUTANEOUS at 21:21

## 2020-07-10 RX ADMIN — CEFTRIAXONE SODIUM 1 G: 1 INJECTION, POWDER, FOR SOLUTION INTRAMUSCULAR; INTRAVENOUS at 11:16

## 2020-07-10 RX ADMIN — HEPARIN SODIUM 5000 UNITS: 5000 INJECTION INTRAVENOUS; SUBCUTANEOUS at 05:18

## 2020-07-10 RX ADMIN — ACETAMINOPHEN 650 MG: 325 TABLET, FILM COATED ORAL at 17:37

## 2020-07-10 RX ADMIN — HEPARIN SODIUM 5000 UNITS: 5000 INJECTION INTRAVENOUS; SUBCUTANEOUS at 14:28

## 2020-07-10 NOTE — PROGRESS NOTES
Problem: Falls - Risk of  Goal: *Absence of Falls  Description: Document Jeanine Rameshs Fall Risk and appropriate interventions in the flowsheet.   Outcome: Progressing Towards Goal  Note: Fall Risk Interventions:            Medication Interventions: Assess postural VS orthostatic hypotension, Patient to call before getting OOB                   Problem: Patient Education: Go to Patient Education Activity  Goal: Patient/Family Education  Outcome: Progressing Towards Goal

## 2020-07-10 NOTE — WOUND CARE
Pt seen for LLE wounds. Noted from previous admission pt had I and D of left leg from injections. Pt states that these wounds started because she was injecting into those sites. Removed old dressings and pt screamed out in pain. Noted two small open ulcers on medial aspect of left lower leg. Superior wound measuring 1.5 X 2.1 X 0.3 and inferior wound measuring 1.5 X  1.5 X 0.3 cm. Superior wound with blanchable erythema surrounding wound, pale pink wound bed, painful to touch. Inferior wound with pale pink wound bed and serosanguinous drainage less painful to touch and no erythema surrounding it. Noted also in between each wound a raised area where patient admits to injecting and states that this is how those areas start before they open up. Recommend xeroform over open areas cover with ABD pad and secure with kerlix. Recommend daily dressing changes. If another area opens up may continue same dressing on that area and call wound care to re-assess. Wound team will continue to follow while in acute care setting.

## 2020-07-10 NOTE — PROGRESS NOTES
CM contacted patient via phone to complete assessment. Demographic information confirmed. Patient states address listed is for mailing, however, patient states she is homeless. Patient states she may be able to stay with a friend at the time of discharge, but is unable to confirm this information. The patient is independent with completing ADL's. Preferred Pharmacy is [facility] [address]. Patient states \" I have no challenges getting my medications right now. \" Patient confirmed no HH services. Discharge planning: Dispo unclear at this time. CM asked if the patient would like list of Shelters to assist with placement. Patient states she would like to obtain this information. CM will provide this information and list of housing resources. CM will also assist with a PCP. CM continues to follow. I have confirmed that the patient has the capacity at home to be able to do a virtual visit with their PCP. Care Management Interventions  PCP Verified by CM: Yes(CM will send referral to [facility]. )  Mode of Transport at Discharge: Other (see comment)(TBD: based upon need. )  Transition of Care Consult (CM Consult): Discharge Planning  Discharge Durable Medical Equipment: No(Patient confirmed no DME. )  Physical Therapy Consult: No  Occupational Therapy Consult: No  Speech Therapy Consult: No  Current Support Network: Other(Homeless )  Confirm Follow Up Transport: Friends  The Plan for Transition of Care is Related to the Following Treatment Goals : Patient to obtain care to become medically stable.    Name of the Patient Representative Who was Provided with a Choice of Provider and Agrees with the Discharge Plan: Patient   [address] Provided?: No  Discharge Location  Discharge Placement: Unable to determine at this time

## 2020-07-10 NOTE — PROGRESS NOTES
Progress Note    Patient: Asia Felix MRN: [id number]  SSN: [id number]    YOB: 1985  Age: 36 y.o. Sex: female      Admit Date: 7/9/2020    LOS: 1 day     Subjective:   She was found in no distress. Her leg pain is better     Objective:     Vitals:    07/10/20 0442 07/10/20 0745 07/10/20 1130 07/10/20 1505   BP: 107/70 106/72 102/70 107/70   Pulse: 80 93 87 75   Resp: 15 14 16 16   Temp: 97.7 °F (36.5 °C) 97.8 °F (36.6 °C) 97.7 °F (36.5 °C) 97.6 °F (36.4 °C)   SpO2: 97% 99% 98% 97%   Weight:       Height:            Intake and Output:  Current Shift: No intake/output data recorded. Last three shifts: No intake/output data recorded. Physical Exam:   GENERAL: alert, cooperative, no distress, appears stated age  EYE: negative  LYMPHATIC: Cervical, supraclavicular, and axillary nodes normal.   THROAT & NECK: normal and no erythema or exudates noted. LUNG: clear to auscultation bilaterally  HEART: regular rate and rhythm, S1, S2 normal, no murmur, click, rub or gallop  ABDOMEN: soft, non-tender. Bowel sounds normal. No masses,  no organomegaly  EXTREMITIES:  Left leg with chronic ulcers, non purulence, mild erythema   SKIN: Normal.  NEUROLOGIC: negative  PSYCHIATRIC: non focal    Lab/Data Review: All lab results for the last 24 hours reviewed.      Assessment:     Principal Problem:    E coli bacteremia (7/9/2020)    Active Problems:    IV drug abuse ([facility] [address] (3/12/2020)      UTI (urinary tract infection) (7/9/2020)        Plan:     -Gram negative bacteremia due to E coli  Possible source is UTI  Renal us was unrevealing   She also has chronic ulcers over the left leg  Continue iv rocephin  Tylenol if fever  Monitor BC and UCx    -Splenomegaly: new  Monitor      -IVDA: counseling given n cessation  Monitor     -Smoker: cessation encouraged  On nicotine patch     DVT ppx: heparin sq     Code status: full    Disposition: home soon    Signed By: Reji Calxi MD     July 10, 2020

## 2020-07-10 NOTE — PROGRESS NOTES
RN found lighter in pt bathroom. RN told pt lighter would be kept at  until discharge. Taken and put into home med cabinet (2) with pt's label.

## 2020-07-11 VITALS
OXYGEN SATURATION: 100 % | WEIGHT: 135 LBS | HEIGHT: 67 IN | BODY MASS INDEX: 21.19 KG/M2 | HEART RATE: 80 BPM | RESPIRATION RATE: 18 BRPM | SYSTOLIC BLOOD PRESSURE: 103 MMHG | TEMPERATURE: 97.9 F | DIASTOLIC BLOOD PRESSURE: 70 MMHG

## 2020-07-11 LAB
BACTERIA SPEC CULT: ABNORMAL
BACTERIA SPEC CULT: ABNORMAL
GRAM STN SPEC: ABNORMAL
SERVICE CMNT-IMP: ABNORMAL

## 2020-07-11 PROCEDURE — 74011250637 HC RX REV CODE- 250/637: Performed by: INTERNAL MEDICINE

## 2020-07-11 PROCEDURE — 74011250636 HC RX REV CODE- 250/636: Performed by: INTERNAL MEDICINE

## 2020-07-11 RX ORDER — CIPROFLOXACIN 500 MG/1
500 TABLET ORAL EVERY 12 HOURS
Status: DISCONTINUED | OUTPATIENT
Start: 2020-07-11 | End: 2020-07-11 | Stop reason: HOSPADM

## 2020-07-11 RX ORDER — KETOROLAC TROMETHAMINE 30 MG/ML
30 INJECTION, SOLUTION INTRAMUSCULAR; INTRAVENOUS
Status: COMPLETED | OUTPATIENT
Start: 2020-07-11 | End: 2020-07-11

## 2020-07-11 RX ORDER — CIPROFLOXACIN 500 MG/1
500 TABLET ORAL EVERY 12 HOURS
Qty: 24 TAB | Refills: 0 | Status: SHIPPED | OUTPATIENT
Start: 2020-07-11 | End: 2020-07-23

## 2020-07-11 RX ORDER — ACETAMINOPHEN 325 MG/1
650 TABLET ORAL
Qty: 20 TAB | Refills: 0 | Status: SHIPPED | OUTPATIENT
Start: 2020-07-11

## 2020-07-11 RX ADMIN — HEPARIN SODIUM 5000 UNITS: 5000 INJECTION INTRAVENOUS; SUBCUTANEOUS at 05:42

## 2020-07-11 RX ADMIN — KETOROLAC TROMETHAMINE 30 MG: 30 INJECTION, SOLUTION INTRAMUSCULAR at 10:03

## 2020-07-11 RX ADMIN — CIPROFLOXACIN 500 MG: 500 TABLET, FILM COATED ORAL at 09:03

## 2020-07-11 RX ADMIN — ACETAMINOPHEN 650 MG: 325 TABLET, FILM COATED ORAL at 05:55

## 2020-07-11 NOTE — DISCHARGE SUMMARY
Discharge Summary     Patient: Nisha Hdz MRN: [id number]  SSN: [id number]    YOB: 1985  Age: 36 y.o. Sex: female       Admit Date: 7/9/2020    Discharge Date: 7/11/2020      Admission Diagnoses: Active Hospital Problems    Diagnosis Date Noted    UTI (urinary tract infection) 07/09/2020    E coli bacteremia 07/09/2020    IV drug abuse ([facility] 03/12/2020     Discharge Diagnoses:   Problem List as of 7/11/2020 Never Reviewed          Codes Class Noted - Resolved    UTI (urinary tract infection) ICD-10-CM: N39.0  ICD-9-CM: 599.0  7/9/2020 - Present        * (Principal) E coli bacteremia ICD-10-CM: R78.81, B96.20  ICD-9-CM: 790.7, 041.49  7/9/2020 - Present        Abscess ICD-10-CM: L02.91  ICD-9-CM: 682.9  3/12/2020 - Present        Cellulitis, leg ICD-10-CM: L03.119  ICD-9-CM: 682.6  3/12/2020 - Present        Hypokalemia ICD-10-CM: E87.6  ICD-9-CM: 276.8  3/12/2020 - Present        IV drug abuse ([facility] ICD-10-CM: F19.10  ICD-9-CM: 305.90  3/12/2020 - Present        Nicotine dependence ICD-10-CM: F17.200  ICD-9-CM: 305.1  3/12/2020 - Present        Dysarthria ICD-10-CM: R47.1  ICD-9-CM: 784.51  4/20/2019 - Present        Leukocytosis ICD-10-CM: X21.517  ICD-9-CM: 288.60  4/20/2019 - Present        Acute chest pain ICD-10-CM: R07.9  ICD-9-CM: 786.50  4/20/2019 - Present        Smoking greater than 20 pack years ICD-10-CM: F17.210  ICD-9-CM: 305.1  4/20/2019 - Present        Nausea and vomiting ICD-10-CM: R11.2  ICD-9-CM: 787.01  4/20/2019 - Present        RESOLVED: IVDA (intravenous drug abuse) complicating pregnancy ([facility] [address] ICD-10-CM: O99.320, F19.10  ICD-9-CM: 648.40, 305.90  7/9/2020 - 7/9/2020               Discharge Condition: [facility] Course:   Nisha Hdz is a 36 y.o. female , homeless, who has no significant PMH but IVDA and smoking, who was called from [address] [place] [address] after her Blood cultures turned positive for e.coli on 7/7/20.  The patient visited the ED for left leg ulcers and cellulitis, which are chronic, she was given po clindamycin but she never filled the prescription. She had dysuria and an UA revealed UTI. She was admitted for IV antibiotic ( rocephin ). Urine culture showed GNRs. Repeated Blood cultures were negative. Her clinical status improved and she remained afebrile. The patient was counseled on quit using drugs. She will complete 12 more days on po ciprofloxacin.      Physical Exam:   GENERAL: alert, cooperative, no distress, appears stated age  EYE: negative  LYMPHATIC: Cervical, supraclavicular, and axillary nodes normal.   THROAT & NECK: normal and no erythema or exudates noted.   LUNG: clear to auscultation bilaterally  HEART: regular rate and rhythm, S1, S2 normal, no murmur, click, rub or gallop  ABDOMEN: soft, non-tender. Bowel sounds normal. No masses,  no organomegaly  EXTREMITIES:  Left leg with chronic ulcers, non purulence, mild erythema   SKIN: Normal.  NEUROLOGIC: negative  PSYCHIATRIC: non focal    Consults: None    Significant Diagnostic Studies: see note     Disposition: home    Discharge Medications:   Current Discharge Medication List      START taking these medications    Details   acetaminophen (TYLENOL) 325 mg tablet Take 2 Tabs by mouth every six (6) hours as needed for Pain. Qty: 20 Tab, Refills: 0      ciprofloxacin HCl (CIPRO) 500 mg tablet Take 1 Tab by mouth every twelve (12) hours for 12 days.   Qty: 24 Tab, Refills: 0         STOP taking these medications       clindamycin (CLEOCIN) 300 mg capsule Comments:   Reason for Stopping:         trimethoprim-sulfamethoxazole (BACTRIM DS, SEPTRA DS) 160-800 mg per tablet Comments:   Reason for Stopping:               Activity: Activity as tolerated  Diet: Regular Diet  Wound Care: As directed    Follow-up Appointments   Procedures    FOLLOW UP VISIT Appointment in: One Week pcp     pcp     Standing Status:   Standing     Number of Occurrences:   1     Order Specific Question:   Appointment in Answer:    One Week       Signed By: Carlo Quintana MD     July 11, 2020

## 2020-07-12 LAB
BACTERIA SPEC CULT: ABNORMAL
BACTERIA SPEC CULT: ABNORMAL
SERVICE CMNT-IMP: ABNORMAL

## 2020-07-29 ENCOUNTER — APPOINTMENT (OUTPATIENT)
Dept: ULTRASOUND IMAGING | Age: 35
End: 2020-07-29
Payer: MEDICAID

## 2020-07-29 ENCOUNTER — HOSPITAL ENCOUNTER (OUTPATIENT)
Age: 35
Setting detail: OBSERVATION
Discharge: HOME | End: 2020-07-31
Admitting: HOSPITALIST
Payer: MEDICAID

## 2020-07-29 DIAGNOSIS — B17.9 ACUTE HEPATITIS: Primary | ICD-10-CM

## 2020-07-29 PROBLEM — K75.9 HEPATITIS: Status: ACTIVE | Noted: 2020-07-29

## 2020-07-29 LAB
ALBUMIN SERPL-MCNC: 2.9 G/DL (ref 3.5–5)
ALBUMIN/GLOB SERPL: 0.5 {RATIO} (ref 1.2–3.5)
ALP SERPL-CCNC: 449 U/L (ref 50–136)
ALT SERPL-CCNC: 1588 U/L (ref 12–65)
AMMONIA PLAS-SCNC: 13 UMOL/L (ref 11–32)
AMPHET UR QL SCN: POSITIVE
ANION GAP SERPL CALC-SCNC: 7 MMOL/L (ref 7–16)
APAP SERPL-MCNC: <10 UG/ML (ref 10–30)
AST SERPL-CCNC: 789 U/L (ref 15–37)
BACTERIA URNS QL MICRO: ABNORMAL /HPF
BARBITURATES UR QL SCN: NEGATIVE
BASOPHILS # BLD: 0 K/UL (ref 0–0.2)
BASOPHILS NFR BLD: 1 % (ref 0–2)
BENZODIAZ UR QL: NEGATIVE
BILIRUB SERPL-MCNC: 7.6 MG/DL (ref 0.2–1.1)
BUN SERPL-MCNC: 7 MG/DL (ref 6–23)
CALCIUM SERPL-MCNC: 9.3 MG/DL (ref 8.3–10.4)
CANNABINOIDS UR QL SCN: POSITIVE
CASTS URNS QL MICRO: 0 /LPF
CHLORIDE SERPL-SCNC: 101 MMOL/L (ref 98–107)
CO2 SERPL-SCNC: 29 MMOL/L (ref 21–32)
COCAINE UR QL SCN: POSITIVE
CREAT SERPL-MCNC: 1.09 MG/DL (ref 0.6–1)
CRYSTALS URNS QL MICRO: ABNORMAL /LPF
DIFFERENTIAL METHOD BLD: ABNORMAL
EOSINOPHIL # BLD: 0.1 K/UL (ref 0–0.8)
EOSINOPHIL NFR BLD: 2 % (ref 0.5–7.8)
EPI CELLS #/AREA URNS HPF: ABNORMAL /HPF
ERYTHROCYTE [DISTWIDTH] IN BLOOD BY AUTOMATED COUNT: 17.4 % (ref 11.9–14.6)
ETHANOL SERPL-MCNC: <3 MG/DL
GLOBULIN SER CALC-MCNC: 6 G/DL (ref 2.3–3.5)
GLUCOSE SERPL-MCNC: 117 MG/DL (ref 65–100)
HCG UR QL: NEGATIVE
HCT VFR BLD AUTO: 44 % (ref 35.8–46.3)
HGB BLD-MCNC: 14.1 G/DL (ref 11.7–15.4)
IMM GRANULOCYTES # BLD AUTO: 0 K/UL (ref 0–0.5)
IMM GRANULOCYTES NFR BLD AUTO: 0 % (ref 0–5)
INR PPP: 1
LACTATE SERPL-SCNC: 1.3 MMOL/L (ref 0.4–2)
LACTATE SERPL-SCNC: 2.5 MMOL/L (ref 0.4–2)
LYMPHOCYTES # BLD: 2.2 K/UL (ref 0.5–4.6)
LYMPHOCYTES NFR BLD: 49 % (ref 13–44)
MAGNESIUM SERPL-MCNC: 1.9 MG/DL (ref 1.8–2.4)
MCH RBC QN AUTO: 27.8 PG (ref 26.1–32.9)
MCHC RBC AUTO-ENTMCNC: 32 G/DL (ref 31.4–35)
MCV RBC AUTO: 86.8 FL (ref 79.6–97.8)
METHADONE UR QL: NEGATIVE
MONOCYTES # BLD: 0.4 K/UL (ref 0.1–1.3)
MONOCYTES NFR BLD: 8 % (ref 4–12)
MUCOUS THREADS URNS QL MICRO: 0 /LPF
NEUTS SEG # BLD: 1.8 K/UL (ref 1.7–8.2)
NEUTS SEG NFR BLD: 40 % (ref 43–78)
NRBC # BLD: 0 K/UL (ref 0–0.2)
OPIATES UR QL: POSITIVE
OTHER OBSERVATIONS,UCOM: ABNORMAL
PCP UR QL: NEGATIVE
PLATELET # BLD AUTO: 264 K/UL (ref 150–450)
PLATELET COMMENTS,PCOM: ADEQUATE
PMV BLD AUTO: 11.8 FL (ref 9.4–12.3)
POTASSIUM SERPL-SCNC: 3.4 MMOL/L (ref 3.5–5.1)
PROT SERPL-MCNC: 8.9 G/DL (ref 6.3–8.2)
PROTHROMBIN TIME: 13.6 SEC (ref 12–14.7)
RBC # BLD AUTO: 5.07 M/UL (ref 4.05–5.2)
RBC #/AREA URNS HPF: ABNORMAL /HPF
RBC MORPH BLD: ABNORMAL
SALICYLATES SERPL-MCNC: <1.7 MG/DL (ref 2.8–20)
SODIUM SERPL-SCNC: 137 MMOL/L (ref 136–145)
WBC # BLD AUTO: 4.5 K/UL (ref 4.3–11.1)
WBC MORPH BLD: ABNORMAL
WBC URNS QL MICRO: ABNORMAL /HPF

## 2020-07-29 PROCEDURE — 80307 DRUG TEST PRSMV CHEM ANLYZR: CPT

## 2020-07-29 PROCEDURE — 85025 COMPLETE CBC W/AUTO DIFF WBC: CPT

## 2020-07-29 PROCEDURE — 74011250637 HC RX REV CODE- 250/637: Performed by: HOSPITALIST

## 2020-07-29 PROCEDURE — 81025 URINE PREGNANCY TEST: CPT

## 2020-07-29 PROCEDURE — 76705 ECHO EXAM OF ABDOMEN: CPT

## 2020-07-29 PROCEDURE — 87040 BLOOD CULTURE FOR BACTERIA: CPT

## 2020-07-29 PROCEDURE — 87086 URINE CULTURE/COLONY COUNT: CPT

## 2020-07-29 PROCEDURE — 96365 THER/PROPH/DIAG IV INF INIT: CPT

## 2020-07-29 PROCEDURE — 81003 URINALYSIS AUTO W/O SCOPE: CPT

## 2020-07-29 PROCEDURE — 87389 HIV-1 AG W/HIV-1&-2 AB AG IA: CPT

## 2020-07-29 PROCEDURE — 74011250636 HC RX REV CODE- 250/636

## 2020-07-29 PROCEDURE — 85610 PROTHROMBIN TIME: CPT

## 2020-07-29 PROCEDURE — 74011636637 HC RX REV CODE- 636/637: Performed by: HOSPITALIST

## 2020-07-29 PROCEDURE — 82140 ASSAY OF AMMONIA: CPT

## 2020-07-29 PROCEDURE — 83735 ASSAY OF MAGNESIUM: CPT

## 2020-07-29 PROCEDURE — 99218 HC RM OBSERVATION: CPT

## 2020-07-29 PROCEDURE — 80074 ACUTE HEPATITIS PANEL: CPT

## 2020-07-29 PROCEDURE — 87186 SC STD MICRODIL/AGAR DIL: CPT

## 2020-07-29 PROCEDURE — 77030040361 HC SLV COMPR DVT MDII -B

## 2020-07-29 PROCEDURE — 83605 ASSAY OF LACTIC ACID: CPT

## 2020-07-29 PROCEDURE — 99285 EMERGENCY DEPT VISIT HI MDM: CPT

## 2020-07-29 PROCEDURE — 87088 URINE BACTERIA CULTURE: CPT

## 2020-07-29 PROCEDURE — 81015 MICROSCOPIC EXAM OF URINE: CPT

## 2020-07-29 PROCEDURE — 80053 COMPREHEN METABOLIC PANEL: CPT

## 2020-07-29 PROCEDURE — 74011250636 HC RX REV CODE- 250/636: Performed by: HOSPITALIST

## 2020-07-29 PROCEDURE — 96361 HYDRATE IV INFUSION ADD-ON: CPT

## 2020-07-29 RX ORDER — IBUPROFEN 200 MG
1 TABLET ORAL EVERY 24 HOURS
Status: DISCONTINUED | OUTPATIENT
Start: 2020-07-29 | End: 2020-07-31 | Stop reason: HOSPADM

## 2020-07-29 RX ORDER — BUPRENORPHINE HYDROCHLORIDE AND NALOXONE HYDROCHLORIDE DIHYDRATE 2; .5 MG/1; MG/1
1 TABLET SUBLINGUAL
Status: DISCONTINUED | OUTPATIENT
Start: 2020-07-29 | End: 2020-07-31 | Stop reason: HOSPADM

## 2020-07-29 RX ORDER — POTASSIUM CHLORIDE 20 MEQ/1
40 TABLET, EXTENDED RELEASE ORAL
Status: COMPLETED | OUTPATIENT
Start: 2020-07-29 | End: 2020-07-29

## 2020-07-29 RX ORDER — SODIUM CHLORIDE 9 MG/ML
1000 INJECTION, SOLUTION INTRAVENOUS ONCE
Status: COMPLETED | OUTPATIENT
Start: 2020-07-29 | End: 2020-07-29

## 2020-07-29 RX ORDER — SODIUM CHLORIDE 0.9 % (FLUSH) 0.9 %
5-40 SYRINGE (ML) INJECTION EVERY 8 HOURS
Status: DISCONTINUED | OUTPATIENT
Start: 2020-07-29 | End: 2020-07-31 | Stop reason: HOSPADM

## 2020-07-29 RX ORDER — AMOXICILLIN 250 MG
2 CAPSULE ORAL DAILY
Status: DISCONTINUED | OUTPATIENT
Start: 2020-07-30 | End: 2020-07-31 | Stop reason: HOSPADM

## 2020-07-29 RX ORDER — FACIAL-BODY WIPES
10 EACH TOPICAL DAILY PRN
Status: DISCONTINUED | OUTPATIENT
Start: 2020-07-29 | End: 2020-07-31 | Stop reason: HOSPADM

## 2020-07-29 RX ORDER — ACETAMINOPHEN 650 MG/1
650 SUPPOSITORY RECTAL
Status: DISCONTINUED | OUTPATIENT
Start: 2020-07-29 | End: 2020-07-31 | Stop reason: HOSPADM

## 2020-07-29 RX ORDER — SODIUM CHLORIDE 0.9 % (FLUSH) 0.9 %
5-40 SYRINGE (ML) INJECTION AS NEEDED
Status: DISCONTINUED | OUTPATIENT
Start: 2020-07-29 | End: 2020-07-31 | Stop reason: HOSPADM

## 2020-07-29 RX ORDER — SODIUM CHLORIDE 9 MG/ML
100 INJECTION, SOLUTION INTRAVENOUS CONTINUOUS
Status: DISCONTINUED | OUTPATIENT
Start: 2020-07-29 | End: 2020-07-30

## 2020-07-29 RX ORDER — BUPRENORPHINE HYDROCHLORIDE AND NALOXONE HYDROCHLORIDE DIHYDRATE 2; .5 MG/1; MG/1
1 TABLET SUBLINGUAL DAILY
Status: DISCONTINUED | OUTPATIENT
Start: 2020-07-29 | End: 2020-07-31 | Stop reason: HOSPADM

## 2020-07-29 RX ORDER — ONDANSETRON 2 MG/ML
4 INJECTION INTRAMUSCULAR; INTRAVENOUS
Status: DISCONTINUED | OUTPATIENT
Start: 2020-07-29 | End: 2020-07-31 | Stop reason: HOSPADM

## 2020-07-29 RX ORDER — ADHESIVE BANDAGE
30 BANDAGE TOPICAL DAILY PRN
Status: DISCONTINUED | OUTPATIENT
Start: 2020-07-29 | End: 2020-07-31 | Stop reason: HOSPADM

## 2020-07-29 RX ORDER — ACETAMINOPHEN 325 MG/1
650 TABLET ORAL
Status: DISCONTINUED | OUTPATIENT
Start: 2020-07-29 | End: 2020-07-31 | Stop reason: HOSPADM

## 2020-07-29 RX ADMIN — Medication 10 ML: at 13:35

## 2020-07-29 RX ADMIN — SODIUM CHLORIDE 100 ML/HR: 9 INJECTION, SOLUTION INTRAVENOUS at 22:49

## 2020-07-29 RX ADMIN — SODIUM CHLORIDE 1000 ML: 9 INJECTION, SOLUTION INTRAVENOUS at 02:59

## 2020-07-29 RX ADMIN — SODIUM CHLORIDE 1000 ML: 9 INJECTION, SOLUTION INTRAVENOUS at 07:31

## 2020-07-29 RX ADMIN — BUPRENORPHINE AND NALOXONE 1 TABLET: 2; .5 TABLET SUBLINGUAL at 11:54

## 2020-07-29 RX ADMIN — Medication 20 ML: at 21:03

## 2020-07-29 RX ADMIN — SODIUM CHLORIDE 100 ML/HR: 9 INJECTION, SOLUTION INTRAVENOUS at 10:30

## 2020-07-29 RX ADMIN — VANCOMYCIN HYDROCHLORIDE 1000 MG: 1 INJECTION, POWDER, LYOPHILIZED, FOR SOLUTION INTRAVENOUS at 10:03

## 2020-07-29 RX ADMIN — POTASSIUM CHLORIDE 40 MEQ: 20 TABLET, EXTENDED RELEASE ORAL at 10:03

## 2020-07-29 NOTE — ED PROVIDER NOTES
69-year-old female complaining of not feeling well yellow eyes and several areas cellulitis. Patient states she had a blood infection last week when discharged she did not get her antibiotics filled she been feeling tired lately. Her urine is discolored again. Patient is an IV drug abuser has track marks multiple areas of her body areas of cellulitis or around areas of track marks. Jaundice    This is a recurrent problem. The current episode started more than 2 days ago. The problem occurs constantly. The problem has been gradually worsening. The pain is associated with an unknown factor. The pain is located in the generalized abdominal region. The quality of the pain is dull. The pain is mild. Associated symptoms include dysuria. Pertinent negatives include no diarrhea. The pain is worsened by urination. The pain is relieved by nothing. The patient's surgical history non-contributory. No past medical history on file. No past surgical history on file. No family history on file.     Social History     Socioeconomic History    Marital status: SINGLE     Spouse name: Not on file    Number of children: Not on file    Years of education: Not on file    Highest education level: Not on file   Occupational History    Not on file   Social Needs    Financial resource strain: Not on file    Food insecurity     Worry: Not on file     Inability: Not on file    Transportation needs     Medical: Not on file     Non-medical: Not on file   Tobacco Use    Smoking status: Current Every Day Smoker     Packs/day: 1.00   Substance and Sexual Activity    Alcohol use: No    Drug use: No    Sexual activity: Not on file   Lifestyle    Physical activity     Days per week: Not on file     Minutes per session: Not on file    Stress: Not on file   Relationships    Social connections     Talks on phone: Not on file     Gets together: Not on file     Attends Yazidism service: Not on file     Active member of club or organization: Not on file     Attends meetings of clubs or organizations: Not on file     Relationship status: Not on file    Intimate partner violence     Fear of current or ex partner: Not on file     Emotionally abused: Not on file     Physically abused: Not on file     Forced sexual activity: Not on file   Other Topics Concern    Not on file   Social History Narrative    Not on file         ALLERGIES: Patient has no known allergies. Review of Systems   Constitutional: Negative. Negative for activity change. HENT: Negative. Eyes: Negative. Respiratory: Negative. Cardiovascular: Negative. Gastrointestinal: Positive for jaundice. Negative for diarrhea. Genitourinary: Positive for dysuria. Musculoskeletal: Negative. Skin: Negative. Neurological: Negative. Psychiatric/Behavioral: Negative. All other systems reviewed and are negative. Vitals:    07/29/20 0155 07/29/20 0211 07/29/20 0229   BP: (!) 141/93 134/90 130/70   Pulse: (!) 112 (!) 103 (!) 101   Resp: 16     Temp: 98.4 °F (36.9 °C)     SpO2: 100% 99% 100%   Weight: 61.2 kg (135 lb)     Height: 5' 7\" (1.702 m)              Physical Exam  Vitals signs and nursing note reviewed. Constitutional:       General: She is not in acute distress. Appearance: She is well-developed. She is not diaphoretic. HENT:      Head: Normocephalic and atraumatic. Right Ear: External ear normal.      Left Ear: External ear normal.      Nose: Nose normal.      Mouth/Throat:      Pharynx: No oropharyngeal exudate. Eyes:      General: Scleral icterus present. Right eye: No discharge. Left eye: No discharge. Conjunctiva/sclera: Conjunctivae normal.      Pupils: Pupils are equal, round, and reactive to light. Neck:      Musculoskeletal: Normal range of motion and neck supple. Vascular: No JVD. Trachea: No tracheal deviation. Cardiovascular:      Rate and Rhythm: Normal rate and regular rhythm. Pulmonary:      Effort: Pulmonary effort is normal. No respiratory distress. Breath sounds: Normal breath sounds. No stridor. No wheezing. Chest:      Chest wall: No tenderness. Abdominal:      General: Bowel sounds are normal. There is no distension. Palpations: Abdomen is soft. There is no mass. Tenderness: There is no abdominal tenderness. Musculoskeletal: Normal range of motion. General: No tenderness. Skin:     General: Skin is warm and dry. Coloration: Skin is not pale. Findings: No erythema or rash. Neurological:      Mental Status: She is alert and oriented to person, place, and time. Cranial Nerves: No cranial nerve deficit. Psychiatric:         Behavior: Behavior normal.         Thought Content: Thought content normal.          MDM  Number of Diagnoses or Management Options  Diagnosis management comments: Patient is an IV drug user she has track marks all over her body there is small areas of cellulitis near these puncture wounds. She is positive for many polysubstance abuse. She is jaundiced with an elevated bilirubin ultrasound shows a thickened gallbladder wall but no other signs of cholecystitis. Discussed with GI they will see the patient this morning discussed with hospitalist for admission.        Amount and/or Complexity of Data Reviewed  Clinical lab tests: ordered and reviewed  Tests in the radiology section of CPT®: ordered and reviewed  Tests in the medicine section of CPT®: ordered and reviewed    Risk of Complications, Morbidity, and/or Mortality  Presenting problems: high  Diagnostic procedures: high  Management options: high    Patient Progress  Patient progress: stable         Procedures

## 2020-07-29 NOTE — PROGRESS NOTES
Per patient request CM filled out application for patient online for [address] [place] [address]. Someone should call her once they receive the application. CM also sent referral to [facility] for a PCP.

## 2020-07-29 NOTE — ED NOTES
TRANSFER - OUT REPORT:    Verbal report given to Denise Singer RN(name) on [employer]  being transferred to [facility](unit) for routine progression of care       Report consisted of patients Situation, Background, Assessment and   Recommendations(SBAR). Information from the following report(s) SBAR, ED Summary and MAR was reviewed with the receiving nurse. Lines:   Peripheral IV 07/29/20 Right Antecubital (Active)   Site Assessment Clean, dry, & intact 07/29/20 0230   Phlebitis Assessment 0 07/29/20 0230   Infiltration Assessment 0 07/29/20 0230   Dressing Status Clean, dry, & intact 07/29/20 0230   Dressing Type Transparent 07/29/20 0230        Opportunity for questions and clarification was provided.

## 2020-07-29 NOTE — PROGRESS NOTES
07/29/20 1315   Dual Skin Pressure Injury Assessment   Dual Skin Pressure Injury Assessment X   Second Care Provider (Based on [address] [place] [address]) Serafin Acosta RN   Skin Integumentary   Skin Integumentary (WDL) X    Pressure  Injury Documentation No Pressure Injury Noted-Pressure Ulcer Prevention Initiated   Skin Integrity Tattoos (comment); Scars (comment); Abrasion  (BLE)

## 2020-07-29 NOTE — H&P
H&P      Patient: Alesha Bell               Sex: female             MRN: [id number]      YOB: 1985      Age:  36 y.o. Chief Complaint:  Abdominal pain and jaundice    HPI     This is a 46-year-old lady with history of IV drug abuse, recent history of E. coli UTI and bacteremia, came to the emergency room with complaints of generalized weakness, abdominal pain and jaundice. She was in the hospital on 7/7/2020 for E. coli UTI and E. coli bacteremia. She was discharged on 7/9/2020 with oral Cipro. She did not take antibiotic as she lost prescription. She is not having any fevers since then. Patient denies any burning with urination. But she noticed yellowish discoloration of her urine over the last couple of days. No urgency or frequency of urination. She started having mild, achy pain in the right upper quadrant, 1 week ago, progressively getting worse. Currently moderate in severity, no specific aggravating or relieving factors. Associated with mild nausea but no fevers or chills. No chest pain or shortness of breath or cough or runny nose or sore throat. Her appetite has been poor. She has noticed yellowish discoloration of her eyes in the last couple of days. On initial evaluation in the emergency room, she was found to have elevated AST, ALT, alkaline phosphatase and significantly elevated bilirubin with mild elevation in lactic acid. Considering acute hepatitis and elevated lactate, she was being admitted to hospitalist service for further evaluation and management. Review of Systems  Comprehensive 10 point ROS is done, and pertinent positives & negatives per HPI, rest of them are negative. Past medical history: Recent history of E. coli UTI and E. coli bacteremia. IV drug abuse. Family history: Reviewed and significant for hypertension and heart problems in the family on father's side.     Social history: Smokes every day nearly 1 pack, no alcohol abuse. Admits to doing heroin every day and last use was yesterday. She was also positive for cocaine and amphetamines on the drug screen. Social History     Socioeconomic History    Marital status: SINGLE     Spouse name: Not on file    Number of children: Not on file    Years of education: Not on file    Highest education level: Not on file   Tobacco Use    Smoking status: Current Every Day Smoker     Packs/day: 1.00   Substance and Sexual Activity    Alcohol use: No    Drug use: No       No Known Allergies    Prior to Admission medications    Medication Sig Start Date End Date Taking? Authorizing Provider   acetaminophen (TYLENOL) 325 mg tablet Take 2 Tabs by mouth every six (6) hours as needed for Pain. 20   Va Roberts MD         Physical Exam     Visit Vitals  /73   Pulse 76   Temp 98.4 °F (36.9 °C)   Resp 16   Ht 5' 7\" (1.702 m)   Wt 61.2 kg (135 lb)   SpO2 99%   BMI 21.14 kg/m²      Temp (24hrs), Av.4 °F (36.9 °C), Min:98.4 °F (36.9 °C), Max:98.4 °F (36.9 °C)    Oxygen Therapy  O2 Sat (%): 99 % (20 1008)  Pulse via Oximetry: 79 beats per minute (20 1008)  O2 Device: Room air (20 0155)    Intake/Output Summary (Last 24 hours) at 2020 1122  Last data filed at 2020 0722  Gross per 24 hour   Intake 1000 ml   Output    Net 1000 ml        General: Conscious, No acute distress  Eyes:  JOSEPHINE, no pallor. Icterus present. HENT:             Oral Mucosa is Moist, No sinus tenderness  Neck:               Supple, No JVD  Lungs:  CTA Bilaterally, No significant wheezing  Heart:  S1 S2 regular  Abdomen: Soft, Positive bowel sounds, mild right upper quadrant tenderness, nondistended, No guarding/rigidity/rebound tend.   Extremities: No pedal edema  Neurologic:  AAOX3, No acute FND, Motor: LUE: 5/5, LLE: 5/5, RUE: 5/5, RLE: 5/5  Skin:                No acute rashes  Musculoskeletal: No Acute findings  Psych:             Appropriate mood    LAB  Recent Results (from the past 24 hour(s))   URINE MICROSCOPIC    Collection Time: 07/29/20  2:56 AM   Result Value Ref Range    WBC 20-50 0 /hpf    RBC 0-3 0 /hpf    Epithelial cells 5-10 0 /hpf    Bacteria 2+ (H) 0 /hpf    Casts 0 0 /lpf    Crystals, urine OCCASIONAL 0 /LPF    Mucus 0 0 /lpf    Other observations RESULTS VERIFIED MANUALLY     METABOLIC PANEL, COMPREHENSIVE    Collection Time: 07/29/20  2:58 AM   Result Value Ref Range    Sodium 137 136 - 145 mmol/L    Potassium 3.4 (L) 3.5 - 5.1 mmol/L    Chloride 101 98 - 107 mmol/L    CO2 29 21 - 32 mmol/L    Anion gap 7 7 - 16 mmol/L    Glucose 117 (H) 65 - 100 mg/dL    BUN 7 6 - 23 MG/DL    Creatinine 1.09 (H) 0.6 - 1.0 MG/DL    GFR est AA >60 >60 ml/min/1.73m2    GFR est non-AA >60 >60 ml/min/1.73m2    Calcium 9.3 8.3 - 10.4 MG/DL    Bilirubin, total 7.6 (H) 0.2 - 1.1 MG/DL    ALT (SGPT) 1,588 (H) 12 - 65 U/L    AST (SGOT) 789 (H) 15 - 37 U/L    Alk. phosphatase 449 (H) 50 - 136 U/L    Protein, total 8.9 (H) 6.3 - 8.2 g/dL    Albumin 2.9 (L) 3.5 - 5.0 g/dL    Globulin 6.0 (H) 2.3 - 3.5 g/dL    A-G Ratio 0.5 (L) 1.2 - 3.5     CBC WITH AUTOMATED DIFF    Collection Time: 07/29/20  2:58 AM   Result Value Ref Range    WBC 4.5 4.3 - 11.1 K/uL    RBC 5.07 4.05 - 5.2 M/uL    HGB 14.1 11.7 - 15.4 g/dL    HCT 44.0 35.8 - 46.3 %    MCV 86.8 79.6 - 97.8 FL    MCH 27.8 26.1 - 32.9 PG    MCHC 32.0 31.4 - 35.0 g/dL    RDW 17.4 (H) 11.9 - 14.6 %    PLATELET 884 128 - 282 K/uL    MPV 11.8 9.4 - 12.3 FL    ABSOLUTE NRBC 0.00 0.0 - 0.2 K/uL    NEUTROPHILS 40 (L) 43 - 78 %    LYMPHOCYTES 49 (H) 13 - 44 %    MONOCYTES 8 4.0 - 12.0 %    EOSINOPHILS 2 0.5 - 7.8 %    BASOPHILS 1 0.0 - 2.0 %    IMMATURE GRANULOCYTES 0 0.0 - 5.0 %    ABS. NEUTROPHILS 1.8 1.7 - 8.2 K/UL    ABS. LYMPHOCYTES 2.2 0.5 - 4.6 K/UL    ABS. MONOCYTES 0.4 0.1 - 1.3 K/UL    ABS. EOSINOPHILS 0.1 0.0 - 0.8 K/UL    ABS. BASOPHILS 0.0 0.0 - 0.2 K/UL    ABS. IMM.  GRANS. 0.0 0.0 - 0.5 K/UL    RBC COMMENTS SLIGHT  ANISOCYTOSIS + POIKILOCYTOSIS        WBC COMMENTS Result Confirmed By Smear      PLATELET COMMENTS ADEQUATE      DF AUTOMATED     ACETAMINOPHEN    Collection Time: 07/29/20  2:58 AM   Result Value Ref Range    Acetaminophen level <10 (L) 10.0 - 28.6 ug/mL   SALICYLATE    Collection Time: 07/29/20  2:58 AM   Result Value Ref Range    Salicylate level <7.1 (L) 2.8 - 20.0 MG/DL   AMMONIA    Collection Time: 07/29/20  2:58 AM   Result Value Ref Range    Ammonia 13 11 - 32 UMOL/L   MAGNESIUM    Collection Time: 07/29/20  2:58 AM   Result Value Ref Range    Magnesium 1.9 1.8 - 2.4 mg/dL   LACTIC ACID    Collection Time: 07/29/20  2:58 AM   Result Value Ref Range    Lactic acid 2.5 (HH) 0.4 - 2.0 MMOL/L   ETHYL ALCOHOL    Collection Time: 07/29/20  2:58 AM   Result Value Ref Range    ALCOHOL(ETHYL),SERUM <3 MG/DL   DRUG SCREEN, URINE    Collection Time: 07/29/20  2:58 AM   Result Value Ref Range    PCP(PHENCYCLIDINE) Negative      BENZODIAZEPINES Negative      COCAINE Positive      AMPHETAMINES Positive      METHADONE Negative      THC (TH-CANNABINOL) Positive      OPIATES Positive      BARBITURATES Negative     HCG URINE, QL. - POC    Collection Time: 07/29/20  3:21 AM   Result Value Ref Range    Pregnancy test,urine (POC) Negative NEG     PROTHROMBIN TIME + INR    Collection Time: 07/29/20  9:08 AM   Result Value Ref Range    Prothrombin time 13.6 12.0 - 14.7 sec    INR 1.0         IMAGING:     [employer]    Result Date: 7/29/2020  IMPRESSION: Negative for gallstones or biliary tree obstruction.  Thickened gallbladder wall although it is underdistended and may be normal.       All Micro Results     Procedure Component Value Units Date/Time    CULTURE, URINE [[id number]]     Order Status:  Sent Specimen:  Urine from Clean catch     CULTURE, BLOOD [[id number]] Collected:  07/29/20 0908    Order Status:  Completed Specimen:  Blood Updated:  07/29/20 0958    CULTURE, BLOOD [[id number]] Collected:  07/29/20 0923    Order Status:  Completed Specimen:  Blood Updated:  07/29/20 0958    CULTURE, URINE [[id number]]     Order Status:  Canceled Specimen:  Urine from Clean catch             Assessment/Plan     Principal Problem:    Acute hepatitis (7/29/2020)    Active Problems:    Hepatitis (7/29/2020)        1. hyperbilirubinemia, transaminitis pointed towards acute hepatitis with no clear-cut evidence of hepatic failure. PT/INR and ammonia levels are normal.  Etiology of acute hepatitis could be from illegal substance use but this could be secondary to acute viral hepatitis. We will send acute hepatitis panel, HIV. Place her on IV fluids and monitor. 2.  Lactic acidosis: Likely from hypovolemia and dehydration. Place her on IV fluids and monitor lactate levels. Patient does not look septic. 3.  Recent history of E. coli bacteremia and E. coli UTI. Patient did not take any antibiotics after her discharge from 7/9/2020. Patient had no fevers, she does not have any leukocytosis. UA has pyuria and bacteriuria but she does not have any symptoms of cystitis. Patient might have cleared bacteremia without antibiotics. Will follow-up blood cultures and urine cultures. Hold off on antibiotics for now. 4.  Hypokalemia: Being replaced. 5.  IV drug abuse: Counseled regarding cessation especially in view of acute hepatitis. Will place her on Suboxone to prevent withdrawal while she is in the hospital.  We will refer her to outpatient drug rehab programs at discharge. 6.  Tobacco abuse: Counseled regarding cessation, given nicotine patch to help with the craving. Place her in observation. Seen by GI in consultation in emergency room. Ultrasound of the liver and gallbladder did not show any acute findings. All the pertinent investigative studies and treatment plan was discussed with the patient. Further recommendations as per the clinical course.     DVT prophylaxis: SCDs  Code status: Full  Risk: Moderate    Chava Hathaway MD  July 29, 2020

## 2020-07-29 NOTE — PROGRESS NOTES
TRANSFER - IN REPORT:    Verbal report received from [facility], RN(name) on [employer]  being received from ER(unit) for routine progression of care      Report consisted of patients Situation, Background, Assessment and   Recommendations(SBAR). Information from the following report(s) SBAR was reviewed with the receiving nurse. Opportunity for questions and clarification was provided. Assessment completed upon patients arrival to unit and care assumed.

## 2020-07-29 NOTE — CONSULTS
[facility] Consult Note       Primary GI Physician: none    Referring Provider:  ER MD    Consult Date:  7/29/2020    Admit Date:  7/29/2020    Chief Complaint:  Jaundice    Subjective:     History of Present Illness:  Patient is a 36 y.o. female with PMH of recent hospitalization for infection, who is seen in consultation at the request of the ER MD for jaundice. She was hospitalized for UTI but did not take the antibiotics prescribed at D/C. She has been living with a friend and eating with several friends. She developed jaundice and dark urine about 6 days ago. She has complained of stabbing epigastric pain, radiating to the back, worse with movement. She has no GI bleeding, itching, swelling, or confusion. She admits to ongoing use of multiple substances. She has no close contacts with jaundice and no known h/o liver disease. She denies taking excessive Tylenol. PMH:  No past medical history on file. PSH:  No past surgical history on file. Allergies:  No Known Allergies    Home Medications:  Prior to Admission medications    Medication Sig Start Date End Date Taking? Authorizing Provider   acetaminophen (TYLENOL) 325 mg tablet Take 2 Tabs by mouth every six (6) hours as needed for Pain. 7/11/20   David Melgar MD       Hospital Medications:  No current facility-administered medications for this encounter. Current Outpatient Medications   Medication Sig    acetaminophen (TYLENOL) 325 mg tablet Take 2 Tabs by mouth every six (6) hours as needed for Pain. Social History:  Social History     Tobacco Use    Smoking status: Current Every Day Smoker     Packs/day: 1.00   Substance Use Topics    Alcohol use: No           Family History:  No known liver disease    Review of Systems:  A detailed 10 system ROS is obtained, with pertinent positives as listed above. All others are negative.       Objective:     Physical Exam:  Vitals:  Visit Vitals  /64   Pulse 79 Temp 98.4 °F (36.9 °C)   Resp 16   Ht 5' 7\" (1.702 m)   Wt 61.2 kg (135 lb)   SpO2 100%   BMI 21.14 kg/m²     Gen:  Pt is alert, cooperative, no acute distress  Skin:  Extremities and face reveal numerous scabbed lesions. HEENT: Sclerae icteric. Extra-occular muscles are intact. No oral ulcers. No abnormal pigmentation of the lips. The neck is supple. Cardiovascular: Regular rate and rhythm. No murmurs, gallops, or rubs. Respiratory:  Comfortable breathing with no accessory muscle use. Clear breath sounds anteriorly with no wheezes, rales, or rhonchi. GI:  Abdomen nondistended, soft, and tender in epigastrium and RUQ without rebound. Normal active bowel sounds. No enlargement of the liver or spleen. No masses palpable. Rectal:  Deferred  Musculoskeletal:  No pitting edema of the lower legs. Neurological:  Gross memory appears intact. Patient is alert and oriented. No asterixis  Psychiatric:  Calm but requires frequent redirection during conversation. Lymphatic:  No cervical or supraclavicular adenopathy. Laboratory:    Recent Labs     07/29/20  0258   WBC 4.5   HGB 14.1   HCT 44.0      MCV 86.8      K 3.4*      CO2 29   BUN 7   CREA 1.09*   CA 9.3   MG 1.9   *   *   ALT 1,588*   TBILI 7.6*   ALB 2.9*   TP 8.9*          Assessment:     Acute hepatitis-May be due to documented cocaine plus methamphetamine, or may be acute HAV or HBV. Although she is at risk for HCV, HCV does not usually present this way. She is not in liver failure because she does not have encephalopathy. U/S does not show gallstones or biliary obstruction. The mild GB wall thickening may be due to underdistention of the GB or the adjacent hepatitis; I do not think she has cholecystitis. Plan:     Check INR and await viral hepatitis studies. Limited role for hospitalization. It would be reasonable to keep her until the INR and viral studies are back. Treatment is supportive.     Antione Hopkins Nita Davila MD

## 2020-07-29 NOTE — ED TRIAGE NOTES
Arrives with face mask in place. Reports admitted here recently for \"blood infection\", discharged 3-4 days later with abx prescription however reports lost prescription. Noted in chart admission for bacteremia, uti and ivdu. Admits to continued use of IVDU, heroin meth and fentanyl.  Last use yesterday AM. Reports yellow color to skin and eyes, dark orange colored urine, \"kidney pain\", fatigue, shortness of breath, chills, n/v.

## 2020-07-29 NOTE — PROGRESS NOTES
Care Management Interventions  PCP Verified by CM: No(No PCP will send referral to [facility])  Mode of Transport at Discharge: Self  Transition of Care Consult (CM Consult): Discharge Planning, Other(want a sober living house)  Discharge Durable Medical Equipment: No  Physical Therapy Consult: No  Occupational Therapy Consult: No  Speech Therapy Consult: No  Current Support Network: Other(Lives with friend)  Confirm Follow Up Transport: Friends  Discharge Location  Discharge Placement: Home    CM met with patient in room. Patient alert and orient and tearful. Patient verified all demographic information to be correct. Patient stated she currently lives with a friend in their apartment. Patient stated she might see if she can possibly go back and stay with her mother. Patient stated she knows her addiction is bad and she needs help. Patient stated she is beyond rock bottom. Patient stated she is scared because she now realizes how sick she really is. Patient stated she has suffered with depression for many years but has never been treated for it and would like to go to the mental health clinic to be able to get her depression under control so she can succeed at recovery. Patient stated she would be interested in discharging to a Sober Living. CM explained a lot of those places have a waiting list or they take a bit of time to get into. CM explained we would assist with the applications for the sober living and work at getting her in. Patient does not have a PCP at this time. CM will send a referral to [facility] to assist with obtaining a PCP for patient. CM will continue to follow patient during hospitalization for discharge planning and needs. Please consult or notify CM of new needs. I have confirmed that the patient has the capacity at home to be able to do a virtual visit with their PCP.

## 2020-07-29 NOTE — PROGRESS NOTES
Patient will have a follow up appointment with Delia Harry NP at [facility] August 20, 2020 at 10:00am

## 2020-07-30 LAB
ALBUMIN SERPL-MCNC: 1.9 G/DL (ref 3.5–5)
ALBUMIN/GLOB SERPL: 0.4 {RATIO} (ref 1.2–3.5)
ALP SERPL-CCNC: 357 U/L (ref 50–136)
ALT SERPL-CCNC: 793 U/L (ref 12–65)
ANION GAP SERPL CALC-SCNC: 7 MMOL/L (ref 7–16)
AST SERPL-CCNC: 392 U/L (ref 15–37)
BASOPHILS # BLD: 0 K/UL (ref 0–0.2)
BASOPHILS NFR BLD: 1 % (ref 0–2)
BILIRUB SERPL-MCNC: 5 MG/DL (ref 0.2–1.1)
BUN SERPL-MCNC: 4 MG/DL (ref 6–23)
CALCIUM SERPL-MCNC: 7.8 MG/DL (ref 8.3–10.4)
CHLORIDE SERPL-SCNC: 108 MMOL/L (ref 98–107)
CO2 SERPL-SCNC: 27 MMOL/L (ref 21–32)
CREAT SERPL-MCNC: 0.63 MG/DL (ref 0.6–1)
DIFFERENTIAL METHOD BLD: ABNORMAL
EOSINOPHIL # BLD: 0.1 K/UL (ref 0–0.8)
EOSINOPHIL NFR BLD: 3 % (ref 0.5–7.8)
ERYTHROCYTE [DISTWIDTH] IN BLOOD BY AUTOMATED COUNT: 17.5 % (ref 11.9–14.6)
GLOBULIN SER CALC-MCNC: 4.5 G/DL (ref 2.3–3.5)
GLUCOSE SERPL-MCNC: 84 MG/DL (ref 65–100)
HAV IGM SERPL QL IA: POSITIVE
HBV CORE IGM SERPL QL IA: NEGATIVE
HBV SURFACE AG SERPL QL IA: NEGATIVE
HCT VFR BLD AUTO: 33.4 % (ref 35.8–46.3)
HCV AB S/CO SERPL IA: >11 S/CO RATIO (ref 0–0.9)
HGB BLD-MCNC: 10.7 G/DL (ref 11.7–15.4)
HIV 1+2 AB+HIV1 P24 AG SERPL QL IA: NON REACTIVE
IMM GRANULOCYTES # BLD AUTO: 0 K/UL (ref 0–0.5)
IMM GRANULOCYTES NFR BLD AUTO: 0 % (ref 0–5)
INR PPP: 1.1
LYMPHOCYTES # BLD: 1.8 K/UL (ref 0.5–4.6)
LYMPHOCYTES NFR BLD: 47 % (ref 13–44)
MAGNESIUM SERPL-MCNC: 1.5 MG/DL (ref 1.8–2.4)
MCH RBC QN AUTO: 27.6 PG (ref 26.1–32.9)
MCHC RBC AUTO-ENTMCNC: 32 G/DL (ref 31.4–35)
MCV RBC AUTO: 86.3 FL (ref 79.6–97.8)
MONOCYTES # BLD: 0.3 K/UL (ref 0.1–1.3)
MONOCYTES NFR BLD: 9 % (ref 4–12)
NEUTS SEG # BLD: 1.4 K/UL (ref 1.7–8.2)
NEUTS SEG NFR BLD: 40 % (ref 43–78)
NRBC # BLD: 0 K/UL (ref 0–0.2)
PHOSPHATE SERPL-MCNC: 3.3 MG/DL (ref 2.5–4.5)
PLATELET # BLD AUTO: 200 K/UL (ref 150–450)
PLATELET COMMENTS,PCOM: ADEQUATE
PMV BLD AUTO: 11 FL (ref 9.4–12.3)
POTASSIUM SERPL-SCNC: 3.5 MMOL/L (ref 3.5–5.1)
PROT SERPL-MCNC: 6.4 G/DL (ref 6.3–8.2)
PROTHROMBIN TIME: 14 SEC (ref 12–14.7)
RBC # BLD AUTO: 3.87 M/UL (ref 4.05–5.2)
RBC MORPH BLD: ABNORMAL
SODIUM SERPL-SCNC: 142 MMOL/L (ref 136–145)
WBC # BLD AUTO: 3.6 K/UL (ref 4.3–11.1)
WBC MORPH BLD: ABNORMAL

## 2020-07-30 PROCEDURE — 99218 HC RM OBSERVATION: CPT

## 2020-07-30 PROCEDURE — 96375 TX/PRO/DX INJ NEW DRUG ADDON: CPT

## 2020-07-30 PROCEDURE — 84100 ASSAY OF PHOSPHORUS: CPT

## 2020-07-30 PROCEDURE — 74011636637 HC RX REV CODE- 636/637: Performed by: HOSPITALIST

## 2020-07-30 PROCEDURE — 85610 PROTHROMBIN TIME: CPT

## 2020-07-30 PROCEDURE — 83735 ASSAY OF MAGNESIUM: CPT

## 2020-07-30 PROCEDURE — 74011250637 HC RX REV CODE- 250/637: Performed by: HOSPITALIST

## 2020-07-30 PROCEDURE — 74011250636 HC RX REV CODE- 250/636: Performed by: HOSPITALIST

## 2020-07-30 PROCEDURE — 85025 COMPLETE CBC W/AUTO DIFF WBC: CPT

## 2020-07-30 PROCEDURE — 36415 COLL VENOUS BLD VENIPUNCTURE: CPT

## 2020-07-30 PROCEDURE — 80053 COMPREHEN METABOLIC PANEL: CPT

## 2020-07-30 RX ORDER — MAGNESIUM SULFATE HEPTAHYDRATE 40 MG/ML
2 INJECTION, SOLUTION INTRAVENOUS ONCE
Status: COMPLETED | OUTPATIENT
Start: 2020-07-30 | End: 2020-07-30

## 2020-07-30 RX ORDER — POTASSIUM CHLORIDE 20 MEQ/1
20 TABLET, EXTENDED RELEASE ORAL
Status: COMPLETED | OUTPATIENT
Start: 2020-07-30 | End: 2020-07-30

## 2020-07-30 RX ADMIN — MAGNESIUM SULFATE IN WATER 2 G: 40 INJECTION, SOLUTION INTRAVENOUS at 10:21

## 2020-07-30 RX ADMIN — Medication 10 ML: at 05:28

## 2020-07-30 RX ADMIN — BUPRENORPHINE AND NALOXONE 1 TABLET: 2; .5 TABLET SUBLINGUAL at 08:37

## 2020-07-30 RX ADMIN — Medication 10 ML: at 11:29

## 2020-07-30 RX ADMIN — SENNOSIDES AND DOCUSATE SODIUM 2 TABLET: 8.6; 5 TABLET ORAL at 08:37

## 2020-07-30 RX ADMIN — POTASSIUM CHLORIDE 20 MEQ: 20 TABLET, EXTENDED RELEASE ORAL at 10:22

## 2020-07-30 RX ADMIN — BUPRENORPHINE HYDROCHLORIDE AND NALOXONE HYDROCHLORIDE DIHYDRATE 1 TABLET: 2; .5 TABLET SUBLINGUAL at 23:52

## 2020-07-30 NOTE — PROGRESS NOTES
Hourly rounds completed. Resting in bed. Denies needs or pain at this time. Bed in low locked position. Call light and personal items within reach. Will continue to monitor and give report to oncoming day shift nurse.

## 2020-07-30 NOTE — PROGRESS NOTES
Hourly rounds performed, all needs met. No complaints per shift. Will continue to monitor and give report to oncoming nurse.

## 2020-07-30 NOTE — PROGRESS NOTES
[facility] Progress Note         Admit Date:  7/29/2020    Today's Date:  7/30/2020    CC:  hepatitis    Subjective:     Patient reports mild nausea and soreness in her abdomen. She has had no bleeding, swelling, or confusion. Medications:   Current Facility-Administered Medications   Medication Dose Route Frequency    0.9% sodium chloride infusion  100 mL/hr IntraVENous CONTINUOUS    ondansetron (ZOFRAN) injection 4 mg  4 mg IntraVENous Q4H PRN    buprenorphine-naloxone (SUBOXONE) 2-0.5mg SL tablet  1 Tab SubLINGual DAILY    buprenorphine-naloxone (SUBOXONE) 2-0.5mg SL tablet  1 Tab SubLINGual BID PRN    sodium chloride (NS) flush 5-40 mL  5-40 mL IntraVENous Q8H    sodium chloride (NS) flush 5-40 mL  5-40 mL IntraVENous PRN    acetaminophen (TYLENOL) tablet 650 mg  650 mg Oral Q6H PRN    Or    acetaminophen (TYLENOL) suppository 650 mg  650 mg Rectal Q6H PRN    senna-docusate (PERICOLACE) 8.6-50 mg per tablet 2 Tab  2 Tab Oral DAILY    magnesium hydroxide (MILK OF MAGNESIA) 400 mg/5 mL oral suspension 30 mL  30 mL Oral DAILY PRN    bisacodyL (DULCOLAX) suppository 10 mg  10 mg Rectal DAILY PRN    promethazine (PHENERGAN) with saline injection 12.5 mg  12.5 mg IntraVENous Q6H PRN    nicotine (NICODERM CQ) 21 mg/24 hr patch 1 Patch  1 Patch TransDERmal Q24H         Objective:   Vitals:  Visit Vitals  /84 (BP 1 Location: Right arm, BP Patient Position: At rest)   Pulse 80   Temp 98 °F (36.7 °C)   Resp 18   Ht 5' 7\" (1.702 m)   Wt 61.2 kg (135 lb)   SpO2 98%   BMI 21.14 kg/m²     Intake/Output:  No intake/output data recorded. 07/28 1901 - 07/30 0700  In: 2600 [P.O.:600; I.V.:2000]  Out: -   Exam:  General appearance: alert, cooperative, no distress  Skin: positive jaundice  Lungs: clear to auscultation bilaterally anteriorly  Heart: regular rate and rhythm  Abdomen: soft, tender liver.  Bowel sounds normal. No masses  Extremities: extremities normal, atraumatic, no cyanosis or edema  Neuro:  alert and oriented, no asterixis    Data Review (Labs):    Recent Labs     07/30/20  0626 07/29/20  0908 07/29/20  0258   WBC 3.6*  --  4.5   HGB 10.7*  --  14.1   HCT 33.4*  --  44.0     --  264   MCV 86.3  --  86.8   NA  --   --  137   K  --   --  3.4*   CL  --   --  101   CO2  --   --  29   BUN  --   --  7   CREA  --   --  1.09*   CA  --   --  9.3   MG  --   --  1.9   GLU  --   --  117*   AP  --   --  449*   ALT  --   --  1,588*   TBILI  --   --  7.6*   ALB  --   --  2.9*   TP  --   --  8.9*   PTP  --  13.6  --    INR  --  1.0  --        Assessment:     Principal Problem:    Acute hepatitis (7/29/2020)-hepatitis panel still pending. Regardless of the result, the treatment is supportive. In addition, she must stop using substances. She had a normal INR, unremarkable ultrasound for her condition, and no encephalopaty. Active Problems:    Hepatitis (7/29/2020)    Anemia-variable and consistent with recent values. There has been no bleeding of any kind. Plan: This patient may be discharged today. [facility] will contact her to arrange follow up. Please call with questions.     Vitor Bobo MD

## 2020-07-30 NOTE — PROGRESS NOTES
Progress Note      Patient: Bonny Manzo               Sex: female          MRN: [id number]           YOB: 1985      Age:  36 y.o. PCP:  None  Treatment Team: Attending Provider: Juan Cazares MD; Care Manager: Lisa Fletcher RN; Staff Nurse: Carl Colin  Subjective:     29-year-old lady with history of IV drug abuse, recent history of E. coli UTI and bacteremia, came to the emergency room with complaints of generalized weakness, abdominal pain and jaundice. She was in the hospital on 2020 for E. coli UTI and E. coli bacteremia. She was discharged on 2020 with oral Cipro. She did not take antibiotic as she lost prescription. She is not having any fevers since then. Patient denies any burning with urination. She was admitted to the hospital with complaints of mild right upper quadrant pain and jaundice. She was found to have acute hepatitis at the time of admission and admitted for observation. Acute hepatitis panel was sent which is currently pending. HIV test was also ordered. As patient did not finish her antibiotics for bacteremia, blood cultures and urine cultures were sent on 2020 at the time of admission. She was not started on any antibiotics has she did not had any fever or leukocytosis or dysuria. 2020: Patient complains of fatigue and mild right upper quadrant pain. No nausea or vomiting. No abdominal pain or diarrhea. No burning with urination. No fevers overnight.  INR is normal.        Objective:   Physical Exam:   Visit Vitals  /74 (BP 1 Location: Left arm, BP Patient Position: At rest)   Pulse 89   Temp 99.3 °F (37.4 °C)   Resp 19   Ht 5' 7\" (1.702 m)   Wt 61.2 kg (135 lb)   SpO2 97%   BMI 21.14 kg/m²      Temp (24hrs), Av.1 °F (36.7 °C), Min:97.5 °F (36.4 °C), Max:99.3 °F (37.4 °C)    Oxygen Therapy  O2 Sat (%): 97 % (20 0737)  Pulse via Oximetry: 78 beats per minute (20 1243)  O2 Device: Room air (07/29/20 0155)    Intake/Output Summary (Last 24 hours) at 7/30/2020 0856  Last data filed at 7/30/2020 0530  Gross per 24 hour   Intake 1600 ml   Output    Net 1600 ml      General:          Conscious, No acute distress  Eyes:               JOSEPHINE, no pallor. Icterus present. HENT:             Oral Mucosa is Moist, No sinus tenderness  Neck:               Supple, No JVD  Lungs:             CTA Bilaterally, No significant wheezing  Heart:              S1 S2 regular  Abdomen:        Soft, Positive bowel sounds, mild right upper quadrant tenderness, nondistended, No guarding/rigidity/rebound tend. Extremities:     No pedal edema  Neurologic:       AAOX3, No acute FND, Motor: LUE: 5/5, LLE: 5/5, RUE: 5/5, RLE: 5/5  Skin:                No acute rashes  Musculoskeletal: No Acute findings  Psych:             Appropriate mood    LAB  Recent Results (from the past 24 hour(s))   PROTHROMBIN TIME + INR    Collection Time: 07/29/20  9:08 AM   Result Value Ref Range    Prothrombin time 13.6 12.0 - 14.7 sec    INR 1.0     HIV 1/2 AG/AB, 4TH GENERATION,W RFLX CONFIRM    Collection Time: 07/29/20  9:08 AM   Result Value Ref Range    HIV Screen, 4th gen Non Reactive Non Reactive   LACTIC ACID    Collection Time: 07/29/20 11:59 AM   Result Value Ref Range    Lactic acid 1.3 0.4 - 2.0 MMOL/L   CBC WITH AUTOMATED DIFF    Collection Time: 07/30/20  6:26 AM   Result Value Ref Range    WBC 3.6 (L) 4.3 - 11.1 K/uL    RBC 3.87 (L) 4.05 - 5.2 M/uL    HGB 10.7 (L) 11.7 - 15.4 g/dL    HCT 33.4 (L) 35.8 - 46.3 %    MCV 86.3 79.6 - 97.8 FL    MCH 27.6 26.1 - 32.9 PG    MCHC 32.0 31.4 - 35.0 g/dL    RDW 17.5 (H) 11.9 - 14.6 %    PLATELET 415 504 - 541 K/uL    MPV 11.0 9.4 - 12.3 FL    ABSOLUTE NRBC 0.00 0.0 - 0.2 K/uL    DF PENDING    PROTHROMBIN TIME + INR    Collection Time: 07/30/20  6:26 AM   Result Value Ref Range    Prothrombin time 14.0 12.0 - 14.7 sec    INR 1.1         No results found.     No results found. All Micro Results     Procedure Component Value Units Date/Time    CULTURE, URINE [[id number]]  (Abnormal) Collected:  07/29/20 0256    Order Status:  Completed Specimen:  Urine from Clean catch Updated:  07/30/20 0737     Special Requests: NO SPECIAL REQUESTS        Culture result:       >100,000 COLONIES/mL PRESUMPTIVE ENTEROCOCCUS SPECIES SUBCULTURE IN PROGRESS                  10,000 to 50,000 COLONIES/mL MIXED SKIN AMILCAR ISOLATED          CULTURE, BLOOD [[id number]] Collected:  07/29/20 0908    Order Status:  Completed Specimen:  Blood Updated:  07/29/20 0958    CULTURE, BLOOD [[id number]] Collected:  07/29/20 0923    Order Status:  Completed Specimen:  Blood Updated:  07/29/20 0958    CULTURE, URINE [[id number]]     Order Status:  Canceled Specimen:  Urine from Clean catch           Current Medications Reviewed      Assessment/Plan     Principal Problem:    Acute hepatitis (7/29/2020)    Active Problems:    Hepatitis (7/29/2020)        1. Acute hepatitis with no evidence of hepatic failure. PT/INR and ammonia levels are normal.  Etiology of acute hepatitis could be from illegal substance use but this could be secondary to acute viral hepatitis. We will check acute hepatitis panel, HIV. Liver enzymes and bilirubin trending down.     2.  Lactic acidosis: Likely from hypovolemia and dehydration. Resolved with IV fluids. 3.  Recent history of E. coli bacteremia and E. coli UTI. Patient did not take any antibiotics after her discharge from 7/9/2020. Patient had no fevers, she does not have any leukocytosis. UA has pyuria and bacteriuria but she does not have any symptoms of cystitis. Patient might have cleared bacteremia without antibiotics. Will follow-up blood cultures and urine cultures. Hold off on antibiotics for now. If blood cultures are negative, no need for any antibiotics.   I do not think she needs treatment for any positive urine cultures as she is currently seems to have asymptomatic pyuria/bacteriuria     4. Hypokalemia: replaced.     5.  IV drug abuse: Counseled regarding cessation especially in view of acute hepatitis. Will place her on Suboxone to prevent withdrawal while she is in the hospital.  We will refer her to outpatient drug rehab programs at discharge.     6. Tobacco abuse: Counseled regarding cessation, given nicotine patch to help with the craving.     Ultrasound of the liver and gallbladder did not show any acute findings. Seen by GI in consultation, recommended conservative management and outpatient follow-up. They signed off.      DVT prophylaxis: SCDs  Code status: Full  Risk: Moderate    She could be discharged tomorrow if blood cultures are negative.     Jcarlos Cazares MD  July 30, 2020

## 2020-07-31 VITALS
BODY MASS INDEX: 21.19 KG/M2 | TEMPERATURE: 98 F | DIASTOLIC BLOOD PRESSURE: 81 MMHG | HEART RATE: 79 BPM | OXYGEN SATURATION: 98 % | SYSTOLIC BLOOD PRESSURE: 121 MMHG | HEIGHT: 67 IN | RESPIRATION RATE: 17 BRPM | WEIGHT: 135 LBS

## 2020-07-31 LAB
ALBUMIN SERPL-MCNC: 2 G/DL (ref 3.5–5)
ALBUMIN/GLOB SERPL: 0.4 {RATIO} (ref 1.2–3.5)
ALP SERPL-CCNC: 468 U/L (ref 50–136)
ALT SERPL-CCNC: 643 U/L (ref 12–65)
ANION GAP SERPL CALC-SCNC: 4 MMOL/L (ref 7–16)
AST SERPL-CCNC: 344 U/L (ref 15–37)
BASOPHILS # BLD: 0 K/UL (ref 0–0.2)
BASOPHILS NFR BLD: 1 % (ref 0–2)
BILIRUB SERPL-MCNC: 5.5 MG/DL (ref 0.2–1.1)
BUN SERPL-MCNC: 5 MG/DL (ref 6–23)
CALCIUM SERPL-MCNC: 8.2 MG/DL (ref 8.3–10.4)
CHLORIDE SERPL-SCNC: 103 MMOL/L (ref 98–107)
CO2 SERPL-SCNC: 32 MMOL/L (ref 21–32)
CREAT SERPL-MCNC: 0.66 MG/DL (ref 0.6–1)
DIFFERENTIAL METHOD BLD: ABNORMAL
EOSINOPHIL # BLD: 0.1 K/UL (ref 0–0.8)
EOSINOPHIL NFR BLD: 2 % (ref 0.5–7.8)
ERYTHROCYTE [DISTWIDTH] IN BLOOD BY AUTOMATED COUNT: 17.8 % (ref 11.9–14.6)
GLOBULIN SER CALC-MCNC: 4.9 G/DL (ref 2.3–3.5)
GLUCOSE SERPL-MCNC: 112 MG/DL (ref 65–100)
HCT VFR BLD AUTO: 35.3 % (ref 35.8–46.3)
HGB BLD-MCNC: 11.7 G/DL (ref 11.7–15.4)
IMM GRANULOCYTES # BLD AUTO: 0 K/UL (ref 0–0.5)
IMM GRANULOCYTES NFR BLD AUTO: 0 % (ref 0–5)
LYMPHOCYTES # BLD: 2.1 K/UL (ref 0.5–4.6)
LYMPHOCYTES NFR BLD: 50 % (ref 13–44)
MAGNESIUM SERPL-MCNC: 1.8 MG/DL (ref 1.8–2.4)
MCH RBC QN AUTO: 28.5 PG (ref 26.1–32.9)
MCHC RBC AUTO-ENTMCNC: 33.1 G/DL (ref 31.4–35)
MCV RBC AUTO: 86.1 FL (ref 79.6–97.8)
MONOCYTES # BLD: 0.3 K/UL (ref 0.1–1.3)
MONOCYTES NFR BLD: 7 % (ref 4–12)
NEUTS SEG # BLD: 1.6 K/UL (ref 1.7–8.2)
NEUTS SEG NFR BLD: 40 % (ref 43–78)
NRBC # BLD: 0 K/UL (ref 0–0.2)
PHOSPHATE SERPL-MCNC: 3.4 MG/DL (ref 2.5–4.5)
PLATELET # BLD AUTO: 204 K/UL (ref 150–450)
PLATELET COMMENTS,PCOM: ADEQUATE
PMV BLD AUTO: 11 FL (ref 9.4–12.3)
POTASSIUM SERPL-SCNC: 3.8 MMOL/L (ref 3.5–5.1)
PROT SERPL-MCNC: 6.9 G/DL (ref 6.3–8.2)
RBC # BLD AUTO: 4.1 M/UL (ref 4.05–5.2)
RBC MORPH BLD: ABNORMAL
RBC MORPH BLD: ABNORMAL
SODIUM SERPL-SCNC: 139 MMOL/L (ref 136–145)
WBC # BLD AUTO: 4.1 K/UL (ref 4.3–11.1)
WBC MORPH BLD: SLIGHT

## 2020-07-31 PROCEDURE — 36415 COLL VENOUS BLD VENIPUNCTURE: CPT

## 2020-07-31 PROCEDURE — 85025 COMPLETE CBC W/AUTO DIFF WBC: CPT

## 2020-07-31 PROCEDURE — 87522 HEPATITIS C REVRS TRNSCRPJ: CPT

## 2020-07-31 PROCEDURE — 99218 HC RM OBSERVATION: CPT

## 2020-07-31 PROCEDURE — 84100 ASSAY OF PHOSPHORUS: CPT

## 2020-07-31 PROCEDURE — 80053 COMPREHEN METABOLIC PANEL: CPT

## 2020-07-31 PROCEDURE — 74011250637 HC RX REV CODE- 250/637: Performed by: HOSPITALIST

## 2020-07-31 PROCEDURE — 83735 ASSAY OF MAGNESIUM: CPT

## 2020-07-31 PROCEDURE — 74011636637 HC RX REV CODE- 636/637: Performed by: HOSPITALIST

## 2020-07-31 RX ADMIN — BUPRENORPHINE AND NALOXONE 1 TABLET: 2; .5 TABLET SUBLINGUAL at 09:45

## 2020-07-31 RX ADMIN — Medication 10 ML: at 06:25

## 2020-07-31 RX ADMIN — SENNOSIDES AND DOCUSATE SODIUM 2 TABLET: 8.6; 5 TABLET ORAL at 09:45

## 2020-07-31 RX ADMIN — Medication 10 ML: at 02:21

## 2020-07-31 NOTE — PROGRESS NOTES
Care Management Interventions  PCP Verified by CM: No(No PCP will send referral to [place] referral)  Mode of Transport at Discharge: Self  Transition of Care Consult (CM Consult): Discharge Planning, Other(want a sober living house)  Discharge Durable Medical Equipment: No  Physical Therapy Consult: No  Occupational Therapy Consult: No  Speech Therapy Consult: No  Current Support Network: Other(Lives with friend)  Confirm Follow Up Transport: Friends  Discharge Location  Discharge Placement: Home    Patient to discharge home today. Patient has a follow up with a new PCP and will also follow up with GI.  CM assisted patient with application for Sober Living. Sober living will contact the patient. CM also provided patient with a list of sober living and addiction agencies that can assist with her habit. Patient will transport home with family. All milestones for discharge have been met.

## 2020-07-31 NOTE — PROGRESS NOTES
Problem: Falls - Risk of  Goal: *Absence of Falls  Description: Document Dominguez Diaz Fall Risk and appropriate interventions in the flowsheet.   Outcome: Progressing Towards Goal  Note: Fall Risk Interventions:            Medication Interventions: Teach patient to arise slowly, Patient to call before getting OOB

## 2020-07-31 NOTE — PROGRESS NOTES
Hourly rounds completed. All needs met. Pt c/o abd pain. Interventions per MAR. Will give report to the oncoming day shift nurse.

## 2020-07-31 NOTE — PROGRESS NOTES
Discharge instructions given to pt, pt verbalized understanding. Pt made aware of follow up appointments.

## 2020-07-31 NOTE — DISCHARGE SUMMARY
Hospitalist Discharge Summary     Admit Date:  2020  2:07 AM   Name:  Deana Tellez   Age:  36 y.o.  :  1985   MRN:  [id number]   PCP:  None  Treatment Team: Attending Provider: Erika Ramos MD; Care Manager: Brian Saeed RN; Utilization Review: Clifford Akins; Consulting Provider: Erika Ramos MD    Problem List for this Hospitalization:  Hospital Problems as of 2020 Never Reviewed          Codes Class Noted - Resolved POA    * (Principal) Acute hepatitis ICD-10-CM: B17.9  ICD-9-CM: 788  2020 - Present Yes        Hepatitis ICD-10-CM: K75.9  ICD-9-CM: 573.3  2020 - Present Unknown                Admission HPI from 2020:    \"36year-old lady with history of IV drug abuse, recent history of E. coli UTI and bacteremia, came to the emergency room with complaints of generalized weakness, abdominal pain and jaundice.  She was in the hospital on 2020 for E. coli UTI and E. coli bacteremia.  She was discharged on 2020 with oral Cipro.  She did not take antibiotic as she lost prescription. Michelle Escoto is not having any fevers since then.  Patient denies any burning with urination.       She was admitted to the hospital with complaints of mild right upper quadrant pain and jaundice. She was found to have acute hepatitis at the time of admission and admitted for observation. Acute hepatitis panel was sent which is currently pending. HIV test was also ordered.     As patient did not finish her antibiotics for bacteremia, blood cultures and urine cultures were sent on 2020 at the time of admission. She was not started on any antibiotics has she did not had any fever or leukocytosis or dysuria. \"    Hospital Course:  She was admitted to the general medical floor. Gastroenterology was consulted for the hepatitis. Acute hepatitis panel was sent out and returned positive for Hep A IgM and Hepatitis C.  Hepatitis C viral load was sent out but was not back at the time for discharge. INR was normal. HIV was negative. Asymptomatic cystitis noted. Did not get antibiotics for this. Urine toxicology was positive for amphetamines, cocaine, opiates, THC. She has been advised to stop using drugs. Advised to not share food, wash hands often, avoid using needles, and barrier protection for sexual intercourse. She is hemodynamically stable for discharge to home. Follow up instructions below. Plan was discussed with patient. All questions answered. Patient was stable at time of discharge and was instructed to call or return if there are any concerns or recurrence of symptoms. Diagnostic Imaging/Tests:   [employer]    Result Date: 7/29/2020  RIGHT UPPER QUADRANT ULTRASOUND. HISTORY: Elevated liver function tests. COMPARISON: CT scan from August 2019 FINDINGS: Ultrasonographic Richmond's sign: is reported as negative. Gallstones: None. . Gallbladder Wall: Under distended and thickened, 6 mm Common Bile Duct: is not dilated, 5 mm. Intrahepatic Biliary Tree: is not dilated. Liver: Uniform parenchyma Included portion of the pancreas and right kidney: are unremarkable. IMPRESSION: Negative for gallstones or biliary tree obstruction. Thickened gallbladder wall although it is underdistended and may be normal.       Echocardiogram results:  No results found for this visit on 07/29/20.       All Micro Results     Procedure Component Value Units Date/Time    CULTURE, BLOOD [[id number]] Collected:  07/29/20 0908    Order Status:  Completed Specimen:  Blood Updated:  07/31/20 1017     Special Requests: --        NO SPECIAL REQUESTS  RIGHT  FOREARM       Culture result: NO GROWTH 2 DAYS       CULTURE, BLOOD [[id number]] Collected:  07/29/20 0923    Order Status:  Completed Specimen:  Blood Updated:  07/31/20 1017     Special Requests: --        RIGHT  Antecubital       Culture result: NO GROWTH 2 DAYS       CULTURE, URINE [[id number]]  (Abnormal) Collected:  07/29/20 0256    Order Status:  Completed Specimen:  Urine from Clean catch Updated:  07/31/20 0721     Special Requests: NO SPECIAL REQUESTS        Culture result:       >100,000 COLONIES/mL PRESUMPTIVE ENTEROCOCCUS SPECIES IDENTIFICATION AND SUSCEPTIBILITY TO FOLLOW                  10,000 to 50,000 COLONIES/mL MIXED SKIN AMILCAR ISOLATED          CULTURE, URINE [[id number]]     Order Status:  Canceled Specimen:  Urine from Clean catch           Labs: Results:       BMP, Mg, Phos Recent Labs     07/31/20  0700 07/30/20 0626 07/29/20  0258    142 137   K 3.8 3.5 3.4*    108* 101   CO2 32 27 29   AGAP 4* 7 7   BUN 5* 4* 7   CREA 0.66 0.63 1.09*   CA 8.2* 7.8* 9.3   * 84 117*   MG 1.8 1.5* 1.9   PHOS 3.4 3.3  --       CBC Recent Labs     07/31/20 0700 07/30/20 0626 07/29/20 0258   WBC 4.1* 3.6* 4.5   RBC 4.10 3.87* 5.07   HGB 11.7 10.7* 14.1   HCT 35.3* 33.4* 44.0    200 264   GRANS 40* 40* 40*   LYMPH 50* 47* 49*   EOS 2 3 2   MONOS 7 9 8   BASOS 1 1 1   IG 0 0 0   ANEU 1.6* 1.4* 1.8   ABL 2.1 1.8 2.2   ROYAL 0.1 0.1 0.1   ABM 0.3 0.3 0.4   ABB 0.0 0.0 0.0   AIG 0.0 0.0 0.0      LFT Recent Labs     07/31/20 0700 07/30/20 0626 07/29/20  0258   * 793* 1,588*   * 357* 449*   TP 6.9 6.4 8.9*   ALB 2.0* 1.9* 2.9*   GLOB 4.9* 4.5* 6.0*   AGRAT 0.4* 0.4* 0.5*      Cardiac Testing Lab Results   Component Value Date/Time    Troponin-I, Qt. <0.02 (L) 04/21/2019 11:45 AM    Troponin-I, Qt. <0.02 (L) 04/21/2019 04:02 AM      Coagulation Tests Lab Results   Component Value Date/Time    Prothrombin time 14.0 07/30/2020 06:26 AM    Prothrombin time 13.6 07/29/2020 09:08 AM    INR 1.1 07/30/2020 06:26 AM    INR 1.0 07/29/2020 09:08 AM    INR (POC) 1.0 04/20/2019 08:32 PM      A1c Lab Results   Component Value Date/Time    Hemoglobin A1c 5.8 04/21/2019 04:02 AM      Lipid Panel Lab Results   Component Value Date/Time    Cholesterol, total 179 04/21/2019 04:02 AM    HDL Cholesterol 31 (L) 04/21/2019 04:02 AM    LDL, calculated 101.6 (H) 04/21/2019 04:02 AM    VLDL, calculated 46.4 (H) 04/21/2019 04:02 AM    Triglyceride 232 (H) 04/21/2019 04:02 AM    CHOL/HDL Ratio 5.8 04/21/2019 04:02 AM      Thyroid Panel Lab Results   Component Value Date/Time    TSH 1.140 04/21/2019 04:02 AM        Most Recent UA Lab Results   Component Value Date/Time    Color YELLOW 07/09/2020 11:00 AM    Appearance CLOUDY 07/09/2020 11:00 AM    Specific gravity 1.013 07/09/2020 11:00 AM    pH (UA) 6.0 07/09/2020 11:00 AM    Protein TRACE (A) 07/09/2020 11:00 AM    Glucose Negative 07/09/2020 11:00 AM    Ketone Negative 07/09/2020 11:00 AM    Bilirubin Negative 07/09/2020 11:00 AM    Blood SMALL (A) 07/09/2020 11:00 AM    Urobilinogen 1.0 07/09/2020 11:00 AM    Nitrites Positive (A) 07/09/2020 11:00 AM    Leukocyte Esterase MODERATE (A) 07/09/2020 11:00 AM        No Known Allergies  Immunization History   Administered Date(s) Administered    Tdap 08/07/2019       All Labs from Last 24 Hrs:  Recent Results (from the past 24 hour(s))   CBC WITH AUTOMATED DIFF    Collection Time: 07/31/20  7:00 AM   Result Value Ref Range    WBC 4.1 (L) 4.3 - 11.1 K/uL    RBC 4.10 4.05 - 5.2 M/uL    HGB 11.7 11.7 - 15.4 g/dL    HCT 35.3 (L) 35.8 - 46.3 %    MCV 86.1 79.6 - 97.8 FL    MCH 28.5 26.1 - 32.9 PG    MCHC 33.1 31.4 - 35.0 g/dL    RDW 17.8 (H) 11.9 - 14.6 %    PLATELET 547 386 - 467 K/uL    MPV 11.0 9.4 - 12.3 FL    ABSOLUTE NRBC 0.00 0.0 - 0.2 K/uL    NEUTROPHILS 40 (L) 43 - 78 %    LYMPHOCYTES 50 (H) 13 - 44 %    MONOCYTES 7 4.0 - 12.0 %    EOSINOPHILS 2 0.5 - 7.8 %    BASOPHILS 1 0.0 - 2.0 %    IMMATURE GRANULOCYTES 0 0.0 - 5.0 %    ABS. NEUTROPHILS 1.6 (L) 1.7 - 8.2 K/UL    ABS. LYMPHOCYTES 2.1 0.5 - 4.6 K/UL    ABS. MONOCYTES 0.3 0.1 - 1.3 K/UL    ABS. EOSINOPHILS 0.1 0.0 - 0.8 K/UL    ABS. BASOPHILS 0.0 0.0 - 0.2 K/UL    ABS. IMM.  GRANS. 0.0 0.0 - 0.5 K/UL    RBC COMMENTS OCCASIONAL  ANISOCYTOSIS + POIKILOCYTOSIS        RBC COMMENTS OCCASIONAL  TARGET CELLS WBC COMMENTS SLIGHT      PLATELET COMMENTS ADEQUATE      DF AUTOMATED     METABOLIC PANEL, COMPREHENSIVE    Collection Time: 07/31/20  7:00 AM   Result Value Ref Range    Sodium 139 136 - 145 mmol/L    Potassium 3.8 3.5 - 5.1 mmol/L    Chloride 103 98 - 107 mmol/L    CO2 32 21 - 32 mmol/L    Anion gap 4 (L) 7 - 16 mmol/L    Glucose 112 (H) 65 - 100 mg/dL    BUN 5 (L) 6 - 23 MG/DL    Creatinine 0.66 0.6 - 1.0 MG/DL    GFR est AA >60 >60 ml/min/1.73m2    GFR est non-AA >60 >60 ml/min/1.73m2    Calcium 8.2 (L) 8.3 - 10.4 MG/DL    Bilirubin, total 5.5 (H) 0.2 - 1.1 MG/DL    ALT (SGPT) 643 (H) 12 - 65 U/L    AST (SGOT) 344 (H) 15 - 37 U/L    Alk. phosphatase 468 (H) 50 - 136 U/L    Protein, total 6.9 6.3 - 8.2 g/dL    Albumin 2.0 (L) 3.5 - 5.0 g/dL    Globulin 4.9 (H) 2.3 - 3.5 g/dL    A-G Ratio 0.4 (L) 1.2 - 3.5     MAGNESIUM    Collection Time: 07/31/20  7:00 AM   Result Value Ref Range    Magnesium 1.8 1.8 - 2.4 mg/dL   PHOSPHORUS    Collection Time: 07/31/20  7:00 AM   Result Value Ref Range    Phosphorus 3.4 2.5 - 4.5 MG/DL       Discharge Exam:  Patient Vitals for the past 24 hrs:   Temp Pulse Resp BP SpO2   07/31/20 0751 98 °F (36.7 °C) 64 18 112/75 99 %   07/31/20 0447 98.3 °F (36.8 °C) 74 17 102/64 97 %   07/30/20 2340 99.4 °F (37.4 °C) 82 18 127/89 98 %   07/30/20 2004 99.7 °F (37.6 °C) 84 18 113/70 99 %   07/30/20 1450 98.8 °F (37.1 °C) 73 18 117/78 97 %   07/30/20 1052 98.8 °F (37.1 °C) 78 17 113/72 96 %     Oxygen Therapy  O2 Sat (%): 99 % (07/31/20 0751)  Pulse via Oximetry: 78 beats per minute (07/29/20 1243)  O2 Device: Room air (07/29/20 0155)  No intake or output data in the 24 hours ending 07/31/20 1025    General:    Well nourished. Alert. No distress. Eyes:   Normal sclera. Extraocular movements intact. ENT:  Normocephalic, atraumatic. Moist mucous membranes  CV:   Regular rate and rhythm. No murmur, rub, or gallop. Lungs:  Clear to auscultation bilaterally.   No wheezing, rhonchi, or rales.  Abdomen: Soft, tender RUQ, nondistended. Bowel sounds normal.   Extremities: Warm and dry. No cyanosis or edema. Neurologic:  Sensation intact. Skin:     No rashes or jaundice. Psych:  Normal mood and affect. Discharge Info:   Current Discharge Medication List      CONTINUE these medications which have NOT CHANGED    Details   acetaminophen (TYLENOL) 325 mg tablet Take 2 Tabs by mouth every six (6) hours as needed for Pain. Qty: 20 Tab, Refills: 0               Disposition: home    Activity: Activity as tolerated  Diet: DIET REGULAR    Follow-up Appointments   Procedures    FOLLOW UP VISIT Appointment in: Other (Beth Calix) Follow up with gastroenterology for your hepatitis - they will contact you for an appointment. Follow up with your primary care physician if you have one for hepatitis. Follow up with gastroenterology for your hepatitis - they will contact you for an appointment. Follow up with your primary care physician if you have one for hepatitis. Standing Status:   Standing     Number of Occurrences:   1     Order Specific Question:   Appointment in     Answer: Other (Specify)         Follow-up Information     Follow up With Specialties Details Why Contact Info    Taty Carmona NP Nurse Practitioner  [facility] at [address] [place] [address] Primary Care DT at 10:00 AM on 8/20 [place] [id number].  [place] [address]  [phone]      [facility]  On 8/19/2020 at 1:15 Gabe Wall [address] [place] [address] [phone]    None    None (395) Patient stated that they have no PCP            Time spent in patient discharge planning and coordination 35 minutes.     Signed:  Luis Coburn MD

## 2020-07-31 NOTE — DISCHARGE INSTRUCTIONS
Patient Education        Hepatitis: Care Instructions  Your Care Instructions     Hepatitis is inflammation of the liver. It's caused most often by a virus. It can also be caused by heavy drinking over a long time. Certain medicines can make hepatitis worse. When this condition is severe, the liver can't remove waste from the body. Your body also can't do its other jobs as it should. · Hepatitis A is spread by food or water that has the virus. This type doesn't lead to long-term liver problems. · Hepatitis B is spread through infected blood, semen, or other body fluids during sex. It's also spread by sharing needles to inject drugs. Most people who have it get better after 4 to 8 weeks. After you have had this virus, you will not get it again. If it stays in your body for a long time, it can cause serious liver damage. · Hepatitis C is spread by sharing needles to use drugs. It is sometimes spread through infected blood, semen, or other body fluids during sex. Most people who have this virus have a long-term infection. Sometimes it causes severe liver damage. · Hepatitis from alcohol use can lead to severe liver problems. If you stop drinking, your liver most often will get better. · Some medicines can cause liver damage. These include over-the-counter and herbal medicines. The infection most often goes away when you stop taking the medicine. But this may not be the case if serious liver damage has already happened. · Hepatitis also can be caused when the immune system attacks the liver. This is called autoimmune hepatitis. You can help your liver heal--or lower the chance of liver damage--by following your doctor's advice. Follow-up care is a key part of your treatment and safety. Be sure to make and go to all appointments, and call your doctor if you are having problems. It's also a good idea to know your test results and keep a list of the medicines you take. How can you care for yourself at home?   · Be safe with medicines. If your doctor prescribes antiviral medicine, take it exactly as directed. Do not stop or change a medicine without talking to your doctor first.  · Lower your activity to match your energy. · Avoid alcohol for as long as your doctor says. Alcohol can make liver problems worse. Tell your doctor if you need help to quit. Counseling, support groups, and sometimes medicines can help you stay sober. · Make sure your doctor knows all the medicines you take. Do not take any new medicines unless your doctor says it is okay. · Follow your doctor's advice about your diet. · If you have itchy skin, keep cool, stay out of the sun. Try to wear cotton clothing. Talk to your doctor about using over-the-counter medicines for itching. These include diphenhydramine (Benadryl) and chlorpheniramine (Chlor-Trimeton). Follow the instructions on the label. To prevent spreading hepatitis B or C  · Tell the people you live with or have sex with about your illness as soon as you can. · Don't donate blood or blood products, organs, semen, or eggs (ova). · Stop all sexual activity or use latex condoms until your doctor tells you that you can no longer give the virus to others. Avoid anal contact with a sex partner while you are infected. · Don't share your personal items. These include razors, toothbrushes, towels, and nail files. · Tell your doctor, dentist, and anyone else who may come in contact with your blood about your illness. · If you are pregnant, tell the doctor who will deliver your baby about your illness. If you have hepatitis B, be sure your baby gets medicine to prevent infection. This should start right after birth. · Clean or carefully get rid of anything that has your blood on it. This includes clothing and sanitary pads. · Make sure to clean surfaces that have your blood or any other body fluid on them. Examples are semen and menstrual blood. Use a solution of bleach and water.  To dilute household bleach, follow the directions on the label. Clean toilet seats, countertops, and floors. To prevent hepatitis A  · Always wash your hands after you use the bathroom. And be sure to wash them before you touch food. · If you have been exposed to someone who may have hepatitis A, ask your doctor about a shot of immune globulin. (This is also called gamma globulin.) It can help your body fight the infection. When should you call for help? [id number] anytime you think you may need emergency care. For example, call if:  · You passed out (lost consciousness). Call your doctor now or seek immediate medical care if:  · You have new or worse belly pain. · You have a new or higher fever. · You are dizzy or lightheaded, or you feel like you may faint. · You have symptoms of dehydration, such as:  ? Dry eyes and a dry mouth. ? Passing only a little urine. ? Feeling thirstier than normal.  · You cannot keep down medicine or fluids. · You have new or more blood in stools. · You have new or worse vomiting or diarrhea. Watch closely for changes in your health, and be sure to contact your doctor if:  · You do not get better as expected. Where can you learn more? Go to http://sheri-dennise.info/  Enter B919 in the search box to learn more about \"Hepatitis: Care Instructions. \"  Current as of: February 11, 2020               Content Version: 12.5  © 0471-1043 [employer]. Care instructions adapted under license by [employer] (which disclaims liability or warranty for this information). If you have questions about a medical condition or this instruction, always ask your healthcare professional. [place] [address] any warranty or liability for your use of this information.

## 2020-08-01 LAB
BACTERIA SPEC CULT: ABNORMAL
BACTERIA SPEC CULT: ABNORMAL
SERVICE CMNT-IMP: ABNORMAL

## 2020-08-02 LAB
HCV GENOTYPE: NORMAL
HCV RNA SERPL NAA+PROBE-ACNC: NORMAL IU/ML
HCV RNA SERPL NAA+PROBE-LOG IU: NORMAL LOG10 IU/ML
TEST INFORMATION, 550045: NORMAL

## 2022-03-18 PROBLEM — N39.0 UTI (URINARY TRACT INFECTION): Status: ACTIVE | Noted: 2020-07-09

## 2022-03-19 PROBLEM — F19.10 IV DRUG ABUSE (HCC): Status: ACTIVE | Noted: 2020-03-12

## 2022-03-19 PROBLEM — E87.6 HYPOKALEMIA: Status: ACTIVE | Noted: 2020-03-12

## 2022-03-19 PROBLEM — R11.2 NAUSEA AND VOMITING: Status: ACTIVE | Noted: 2019-04-20

## 2022-03-19 PROBLEM — L02.91 ABSCESS: Status: ACTIVE | Noted: 2020-03-12

## 2022-03-19 PROBLEM — R47.1 DYSARTHRIA: Status: ACTIVE | Noted: 2019-04-20

## 2022-03-19 PROBLEM — L03.119 CELLULITIS, LEG: Status: ACTIVE | Noted: 2020-03-12

## 2022-03-19 PROBLEM — B17.9 ACUTE HEPATITIS: Status: ACTIVE | Noted: 2020-07-29

## 2022-03-19 PROBLEM — F17.210 SMOKING GREATER THAN 20 PACK YEARS: Status: ACTIVE | Noted: 2019-04-20

## 2022-03-19 PROBLEM — B96.20 E COLI BACTEREMIA: Status: ACTIVE | Noted: 2020-07-09

## 2022-03-19 PROBLEM — K75.9 HEPATITIS: Status: ACTIVE | Noted: 2020-07-29

## 2022-03-19 PROBLEM — F17.200 NICOTINE DEPENDENCE: Status: ACTIVE | Noted: 2020-03-12

## 2022-03-19 PROBLEM — R78.81 E COLI BACTEREMIA: Status: ACTIVE | Noted: 2020-07-09

## 2022-03-19 PROBLEM — D72.829 LEUKOCYTOSIS: Status: ACTIVE | Noted: 2019-04-20

## 2022-03-19 PROBLEM — R07.9 ACUTE CHEST PAIN: Status: ACTIVE | Noted: 2019-04-20

## 2022-04-06 ENCOUNTER — APPOINTMENT (OUTPATIENT)
Dept: GENERAL RADIOLOGY | Age: 37
End: 2022-04-06
Attending: PHYSICIAN ASSISTANT
Payer: MEDICAID

## 2022-04-06 ENCOUNTER — APPOINTMENT (OUTPATIENT)
Dept: GENERAL RADIOLOGY | Age: 37
End: 2022-04-06
Attending: NURSE PRACTITIONER
Payer: MEDICAID

## 2022-04-06 ENCOUNTER — HOSPITAL ENCOUNTER (EMERGENCY)
Age: 37
Discharge: HOME | End: 2022-04-06
Attending: STUDENT IN AN ORGANIZED HEALTH CARE EDUCATION/TRAINING PROGRAM
Payer: MEDICAID

## 2022-04-06 VITALS
OXYGEN SATURATION: 99 % | TEMPERATURE: 98 F | RESPIRATION RATE: 20 BRPM | DIASTOLIC BLOOD PRESSURE: 74 MMHG | HEART RATE: 94 BPM | SYSTOLIC BLOOD PRESSURE: 119 MMHG

## 2022-04-06 DIAGNOSIS — L03.115 CELLULITIS OF RIGHT LOWER LEG: Primary | ICD-10-CM

## 2022-04-06 DIAGNOSIS — F19.90 IV DRUG USER: ICD-10-CM

## 2022-04-06 LAB
ALBUMIN SERPL-MCNC: 3.6 G/DL (ref 3.5–5)
ALBUMIN/GLOB SERPL: 0.8 {RATIO} (ref 1.2–3.5)
ALP SERPL-CCNC: 106 U/L (ref 50–136)
ALT SERPL-CCNC: 40 U/L (ref 12–65)
ANION GAP SERPL CALC-SCNC: 5 MMOL/L (ref 7–16)
AST SERPL-CCNC: 24 U/L (ref 15–37)
BASOPHILS # BLD: 0.1 K/UL (ref 0–0.2)
BASOPHILS NFR BLD: 0 % (ref 0–2)
BILIRUB SERPL-MCNC: 0.8 MG/DL (ref 0.2–1.1)
BUN SERPL-MCNC: 8 MG/DL (ref 6–23)
CALCIUM SERPL-MCNC: 9.1 MG/DL (ref 8.3–10.4)
CHLORIDE SERPL-SCNC: 100 MMOL/L (ref 98–107)
CO2 SERPL-SCNC: 27 MMOL/L (ref 21–32)
CREAT SERPL-MCNC: 0.9 MG/DL (ref 0.6–1)
DIFFERENTIAL METHOD BLD: ABNORMAL
EOSINOPHIL # BLD: 0 K/UL (ref 0–0.8)
EOSINOPHIL NFR BLD: 0 % (ref 0.5–7.8)
ERYTHROCYTE [DISTWIDTH] IN BLOOD BY AUTOMATED COUNT: 13.9 % (ref 11.9–14.6)
GLOBULIN SER CALC-MCNC: 4.6 G/DL (ref 2.3–3.5)
GLUCOSE SERPL-MCNC: 169 MG/DL (ref 65–100)
HCG UR QL: NEGATIVE
HCT VFR BLD AUTO: 38.5 % (ref 35.8–46.3)
HGB BLD-MCNC: 12.8 G/DL (ref 11.7–15.4)
IMM GRANULOCYTES # BLD AUTO: 0 K/UL (ref 0–0.5)
IMM GRANULOCYTES NFR BLD AUTO: 0 % (ref 0–5)
LACTATE SERPL-SCNC: 1.3 MMOL/L (ref 0.4–2)
LYMPHOCYTES # BLD: 2 K/UL (ref 0.5–4.6)
LYMPHOCYTES NFR BLD: 18 % (ref 13–44)
MCH RBC QN AUTO: 27.6 PG (ref 26.1–32.9)
MCHC RBC AUTO-ENTMCNC: 33.2 G/DL (ref 31.4–35)
MCV RBC AUTO: 83 FL (ref 79.6–97.8)
MONOCYTES # BLD: 0.9 K/UL (ref 0.1–1.3)
MONOCYTES NFR BLD: 8 % (ref 4–12)
NEUTS SEG # BLD: 8.4 K/UL (ref 1.7–8.2)
NEUTS SEG NFR BLD: 74 % (ref 43–78)
NRBC # BLD: 0 K/UL (ref 0–0.2)
PLATELET # BLD AUTO: 322 K/UL (ref 150–450)
PMV BLD AUTO: 9.4 FL (ref 9.4–12.3)
POTASSIUM SERPL-SCNC: 3 MMOL/L (ref 3.5–5.1)
PROT SERPL-MCNC: 8.2 G/DL (ref 6.3–8.2)
RBC # BLD AUTO: 4.64 M/UL (ref 4.05–5.2)
SODIUM SERPL-SCNC: 132 MMOL/L (ref 136–145)
WBC # BLD AUTO: 11.4 K/UL (ref 4.3–11.1)

## 2022-04-06 PROCEDURE — 81025 URINE PREGNANCY TEST: CPT

## 2022-04-06 PROCEDURE — 83605 ASSAY OF LACTIC ACID: CPT

## 2022-04-06 PROCEDURE — 87040 BLOOD CULTURE FOR BACTERIA: CPT

## 2022-04-06 PROCEDURE — 73140 X-RAY EXAM OF FINGER(S): CPT

## 2022-04-06 PROCEDURE — 74011250636 HC RX REV CODE- 250/636: Performed by: STUDENT IN AN ORGANIZED HEALTH CARE EDUCATION/TRAINING PROGRAM

## 2022-04-06 PROCEDURE — 74011250636 HC RX REV CODE- 250/636: Performed by: NURSE PRACTITIONER

## 2022-04-06 PROCEDURE — 90471 IMMUNIZATION ADMIN: CPT

## 2022-04-06 PROCEDURE — 96365 THER/PROPH/DIAG IV INF INIT: CPT

## 2022-04-06 PROCEDURE — 99285 EMERGENCY DEPT VISIT HI MDM: CPT

## 2022-04-06 PROCEDURE — 80053 COMPREHEN METABOLIC PANEL: CPT

## 2022-04-06 PROCEDURE — 85025 COMPLETE CBC W/AUTO DIFF WBC: CPT

## 2022-04-06 PROCEDURE — 90714 TD VACC NO PRESV 7 YRS+ IM: CPT | Performed by: NURSE PRACTITIONER

## 2022-04-06 PROCEDURE — 73630 X-RAY EXAM OF FOOT: CPT

## 2022-04-06 PROCEDURE — 93005 ELECTROCARDIOGRAM TRACING: CPT | Performed by: STUDENT IN AN ORGANIZED HEALTH CARE EDUCATION/TRAINING PROGRAM

## 2022-04-06 PROCEDURE — 71045 X-RAY EXAM CHEST 1 VIEW: CPT

## 2022-04-06 RX ORDER — CLINDAMYCIN HYDROCHLORIDE 150 MG/1
450 CAPSULE ORAL 3 TIMES DAILY
Qty: 63 CAPSULE | Refills: 0 | Status: SHIPPED | OUTPATIENT
Start: 2022-04-06 | End: 2022-04-13

## 2022-04-06 RX ORDER — SODIUM CHLORIDE 0.9 % (FLUSH) 0.9 %
5-10 SYRINGE (ML) INJECTION EVERY 8 HOURS
Status: DISCONTINUED | OUTPATIENT
Start: 2022-04-06 | End: 2022-04-07 | Stop reason: HOSPADM

## 2022-04-06 RX ORDER — SODIUM CHLORIDE 0.9 % (FLUSH) 0.9 %
5-10 SYRINGE (ML) INJECTION AS NEEDED
Status: DISCONTINUED | OUTPATIENT
Start: 2022-04-06 | End: 2022-04-07 | Stop reason: HOSPADM

## 2022-04-06 RX ORDER — CLINDAMYCIN PHOSPHATE 600 MG/50ML
600 INJECTION INTRAVENOUS
Status: COMPLETED | OUTPATIENT
Start: 2022-04-06 | End: 2022-04-06

## 2022-04-06 RX ADMIN — SODIUM CHLORIDE, SODIUM LACTATE, POTASSIUM CHLORIDE, AND CALCIUM CHLORIDE 1000 ML: 600; 310; 30; 20 INJECTION, SOLUTION INTRAVENOUS at 21:38

## 2022-04-06 RX ADMIN — CLINDAMYCIN PHOSPHATE 600 MG: 600 INJECTION, SOLUTION INTRAVENOUS at 22:00

## 2022-04-06 RX ADMIN — TETANUS AND DIPHTHERIA TOXOIDS ADSORBED 0.5 ML: 2; 2 INJECTION INTRAMUSCULAR at 22:53

## 2022-04-07 NOTE — DISCHARGE INSTRUCTIONS
Clindamycin 450 mg 3 times daily for 7 days. Make sure you take the entire course of antibiotics. If your symptoms are getting more red more swollen or painful as of Friday morning, return to the ED immediately. Take ibuprofen for pain. We will call you with any findings are noted on your blood cultures.

## 2022-04-07 NOTE — ED TRIAGE NOTES
Pt comes in c/o R foot swelling and redness. Onset today in the morning. Reports taking meth and shooting it in that foot 2 days ago. Denied any injuries or other complaints. States she has been taking Tylenol for pain, last dose 4 pm today. Pt also has redness on the inside or R wrist. States she use the same needle she injected in her R foot after she was done with it. Hx of similar events in the past from IV drug use. PA in triage with pt.

## 2022-04-07 NOTE — ED NOTES
Patient presents to the emergency department due to redness, pain and swelling in her right shin and foot. She states last night she injected meth in her foot and shortly after developed redness which is gotten much worse today. Subjective fever and chills. She took some Tylenol about 3 hours prior to arrival.  She states she has previously been septic in the past due to similar issues    Physical examination. Patient has obvious diffuse cellulitis to the right foot and lower shin. She is mildly tachycardic but afebrile    Patient evaluated initially in triage. Rapid Medical Evaluation was conducted and necessary orders have been placed. I have performed a medical screening exam.  Care will now be transferred to the attending physician in the emergency department.   TOSHIA Bucio 8:54 PM

## 2022-04-07 NOTE — ED PROVIDER NOTES
DAVE Lane is a 71-year-old female with history of IV drug use and sepsis, presenting to the ED for evaluation of right foot redness. She attempted to inject meth in her right foot yesterday. Throughout the day, the symptoms grew more red, warm and painful. She additionally attempted to inject meth into her right wrist and has an area of bruising and pain without signs of infection. She also notes an area of soft tissue swelling and pain to her left index finger. She is unsure if this was due to a traumatic injury versus infection or insect bite. 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 Has taken Tylenol for pain. Notes subjective fever. Afebrile here in the ED. tetanus is not up-to-date. No past medical history on file. No past surgical history on file. No family history on file. Social History     Socioeconomic History    Marital status: SINGLE     Spouse name: Not on file    Number of children: Not on file    Years of education: Not on file    Highest education level: Not on file   Occupational History    Not on file   Tobacco Use    Smoking status: Current Every Day Smoker     Packs/day: 1.00    Smokeless tobacco: Not on file   Substance and Sexual Activity    Alcohol use: No    Drug use: No    Sexual activity: Not on file   Other Topics Concern    Not on file   Social History Narrative    Not on file     Social Determinants of Health     Financial Resource Strain:     Difficulty of Paying Living Expenses: Not on file   Food Insecurity:     Worried About Running Out of Food in the Last Year: Not on file    [place] of Food in the Last Year: Not on file   Transportation Needs:     Lack of Transportation (Medical): Not on file    Lack of Transportation (Non-Medical):  Not on file   Physical Activity:     Days of Exercise per Week: Not on file    Minutes of Exercise per Session: Not on file   Stress:     Feeling of Stress : Not on file   Social Connections:     Frequency of Communication with Friends and Family: Not on file    Frequency of Social Gatherings with Friends and Family: Not on file    Attends [employer] Services: Not on file    Active Member of Clubs or Organizations: Not on file    Attends Club or Organization Meetings: Not on file    Marital Status: Not on file   Intimate Partner Violence:     Fear of Current or Ex-Partner: Not on file    Emotionally Abused: Not on file    Physically Abused: Not on file    Sexually Abused: Not on file   Housing Stability:     Unable to Pay for Housing in the Last Year: Not on file    Number of [place] in the Last Year: Not on file    Unstable Housing in the Last Year: Not on file         ALLERGIES: Patient has no known allergies. Review of Systems   Constitutional: Negative for activity change, appetite change and fever. HENT: Negative for congestion and sore throat. Respiratory: Negative for cough and shortness of breath. Gastrointestinal: Negative for abdominal pain, diarrhea, nausea and vomiting. Genitourinary: Negative for difficulty urinating. Musculoskeletal: Positive for arthralgias (left index finger pain and swelling). Skin: Positive for color change (right foot redness). Negative for wound. Neurological: Negative for headaches. Hematological: Negative. Vitals:    04/06/22 2052 04/06/22 2116 04/06/22 2130   BP: 124/77 122/70 110/65   Pulse: (!) 103 97 98   Resp: 20 21 20   Temp: 98 °F (36.7 °C)     SpO2: 98% 97% 96%            Physical Exam  Constitutional:       Appearance: Normal appearance. She is not ill-appearing. HENT:      Mouth/Throat:      Mouth: Mucous membranes are moist.      Pharynx: Oropharynx is clear. Eyes:      Pupils: Pupils are equal, round, and reactive to light. Cardiovascular:      Rate and Rhythm: Normal rate and regular rhythm.    Pulmonary:      Effort: Pulmonary effort is normal. No respiratory distress. Breath sounds: Normal breath sounds. Abdominal:      General: Bowel sounds are normal.      Tenderness: There is no abdominal tenderness. There is no guarding. Musculoskeletal:      Left hand: Swelling and tenderness present. Cervical back: Normal range of motion. Comments: Patient has redness and swelling to the left index finger extending up to the mid metacarpal.  Bruising and swelling presents with decreased range of motion secondary to pain and swelling. Cap refill less than 3 seconds. No obvious skin injury or lesion   Skin:     General: Skin is warm. Findings: Erythema present. Comments: Patient has evidence of cellulitis to the right lower extremity, extending to the mid shin without significant soft tissue swelling. No notable puncture wound or palpable abscess. No skin extravasation. See attached photo. Patient additionally has a soft tissue swelling to the right radial wrist on the volar surface about 2 cm x 2 cm. No evidence of cellulitis. Neurological:      General: No focal deficit present. Mental Status: She is alert and oriented to person, place, and time. Mental status is at baseline. Psychiatric:         Mood and Affect: Mood normal.         Thought Content: Thought content normal.                XR 2ND FINGER LT MIN 2 V   Final Result   Negative study. XR FOOT RT MIN 3 V   Final Result   1. Soft tissue swelling. 2. Small heel spur. XR CHEST PORT   Final Result   No acute process. NALINI      Joshua Jacob is a 28-year-old female with history of IV drug use and sepsis, presenting to the ED for evaluation of right foot redness secondary to IV injection. Right leg is concerning for cellulitis. Blood work, blood cultures, chest x-ray, image of the right foot and left finger ordered. Soft tissue swelling noted to the right foot but otherwise all 3 are negative studies. Her white blood cell count is elevated at 11.4. Potassium is low at 3.0, will have her supplement this with food. No EKG changes. No organ dysfunction or metabolic derangement. Lactic acid is 1.3. Blood cultures collected and pending. Clindamycin ordered for antimicrobial treatment. Update her tetanus now. Wound border applied to the right lower leg. Will recommend ibuprofen for inflammation of the left finger. No evidence of osteomyelitis. Recommend return to the ED in 36 hours if right foot redness continues to get worse. Patient is agreeable to this plan. Strict return precautions given. Safe discharge at this time    Dispo: Stable, Discharge to home    Diagnosis:   1. IV drug use  2. Right leg cellulitis     Gabriels CHRIS Higuera  10:19 PM      Ramila Benoit NP; 4/6/2022 @10:19 PM Voice dictation software was used during the making of this note. This software is not perfect and grammatical and other typographical errors may be present.   This note has not been proofread for errors.  ===================================================================

## 2022-12-03 ENCOUNTER — HOSPITAL ENCOUNTER (EMERGENCY)
Age: 37
Discharge: LEFT BEFORE DISCHARGE | End: 2022-12-04
Attending: EMERGENCY MEDICINE
Payer: MEDICAID

## 2022-12-03 ENCOUNTER — HOSPITAL ENCOUNTER (EMERGENCY)
Dept: GENERAL RADIOLOGY | Age: 37
Discharge: HOME | End: 2022-12-06
Payer: MEDICAID

## 2022-12-03 VITALS
DIASTOLIC BLOOD PRESSURE: 77 MMHG | HEART RATE: 115 BPM | RESPIRATION RATE: 22 BRPM | SYSTOLIC BLOOD PRESSURE: 130 MMHG | TEMPERATURE: 99.4 F | OXYGEN SATURATION: 98 %

## 2022-12-03 DIAGNOSIS — N76.0 VAGINITIS AND VULVOVAGINITIS: Primary | ICD-10-CM

## 2022-12-03 DIAGNOSIS — J06.9 ACUTE UPPER RESPIRATORY INFECTION: ICD-10-CM

## 2022-12-03 LAB — HCG UR QL: NEGATIVE

## 2022-12-03 PROCEDURE — 87491 CHLMYD TRACH DNA AMP PROBE: CPT

## 2022-12-03 PROCEDURE — 99283 EMERGENCY DEPT VISIT LOW MDM: CPT

## 2022-12-03 PROCEDURE — 81025 URINE PREGNANCY TEST: CPT

## 2022-12-03 RX ORDER — 0.9 % SODIUM CHLORIDE 0.9 %
1000 INTRAVENOUS SOLUTION INTRAVENOUS ONCE
Status: DISCONTINUED | OUTPATIENT
Start: 2022-12-03 | End: 2022-12-04 | Stop reason: HOSPADM

## 2022-12-03 RX ORDER — DOXYCYCLINE HYCLATE 100 MG/1
100 CAPSULE ORAL
Status: DISCONTINUED | OUTPATIENT
Start: 2022-12-03 | End: 2022-12-04 | Stop reason: HOSPADM

## 2022-12-03 RX ORDER — DEXAMETHASONE SODIUM PHOSPHATE 10 MG/ML
10 INJECTION INTRAMUSCULAR; INTRAVENOUS ONCE
Status: DISCONTINUED | OUTPATIENT
Start: 2022-12-03 | End: 2022-12-04 | Stop reason: HOSPADM

## 2022-12-03 ASSESSMENT — PAIN - FUNCTIONAL ASSESSMENT: PAIN_FUNCTIONAL_ASSESSMENT: NONE - DENIES PAIN

## 2022-12-04 NOTE — ED PROVIDER NOTES
Emergency Department Provider Note                   PCP:                No primary care provider on file. Age: 40 y.o. Sex: female       ICD-10-CM    1. Vaginitis and vulvovaginitis  N76.0       2. Acute upper respiratory infection  J06.9           DISPOSITION Eloped - Left Before Treatment Complete 12/04/2022 12:03:08 AM        MDM  Number of Diagnoses or Management Options  Acute upper respiratory infection  Vaginitis and vulvovaginitis  Diagnosis management comments: Patient initially came in for viral-like symptoms including slight asthma exacerbation. She did receive a breathing treatment. She was slightly tachycardic. She looks nontoxic. She also stated she had concern for STD. I did do an external exam with a female nurse chaperone and she did have exam concerning for potential HPV. She was concerned about syphilis. I did place orders for gonorrhea chlamydia syphilis and wet prep along with basic blood work chest x-ray EKG. While the patient was about to be moved back to her room the registration team stated they saw her leave and she did not come back. Patient eloped. She was nontoxic. Left AGAINST MEDICAL ADVICE. The plan was going to be to treat her prophylactically and all of the antibiotics have been ordered however the patient did not receive any antibiotics. Update: After the orders have been placed and I did my evaluation the patient eloped without receiving any of the antibiotics. She did receive a breathing treatment and was nontoxic in appearance. She was breathing comfortably. Registration informed me she walked out the door and did not come back.                Orders Placed This Encounter   Procedures    Rapid influenza A/B antigens    COVID-19, Rapid    C.trachomatis N.gonorrhoeae DNA    XR CHEST (2 VW)    CBC with Auto Differential    CMP    Troponin    RPR    Pregnancy, Urine        Medications   0.9 % sodium chloride bolus (has no administration in time range)   dexamethasone (DECADRON) injection 10 mg (has no administration in time range)   cefTRIAXone (ROCEPHIN) 1,000 mg in sodium chloride 0.9 % 50 mL IVPB mini-bag (has no administration in time range)   doxycycline hyclate (VIBRAMYCIN) capsule 100 mg (has no administration in time range)   penicillin G benzathine (BICILLIN L-A) [id number] UNIT/2ML 1.2 Million Units (has no administration in time range)       New Prescriptions    No medications on file        Adele Whitmore is a 40 y.o. female who presents to the Emergency Department with chief complaint of    Chief Complaint   Patient presents with    Shortness of Breath    Vaginal Discharge      41-year-old female with history of asthma presenting with shortness of breath chest pain and cough. Denies any fever. She started breathing treatments which did help. Is been going on for several days. She has some chest pain with the cough. Denies any abdominal pain. On review of systems she also states she has concern for STD. She believes it is syphilis. She does just bumpy and spreading. She does state that it initially started as 1 single lesion that is very painful. Review of Systems   All other systems reviewed and are negative. Past Medical History:   Diagnosis Date    Asthma         History reviewed. No pertinent surgical history. History reviewed. No pertinent family history. Social History     Socioeconomic History    Marital status: Single     Spouse name: None    Number of children: None    Years of education: None    Highest education level: None   Tobacco Use    Smoking status: Every Day     Packs/day: 1.00     Types: Cigarettes   Substance and Sexual Activity    Alcohol use: No    Drug use: No         Patient has no known allergies. Previous Medications    ACETAMINOPHEN (TYLENOL) 325 MG TABLET    Take 650 mg by mouth every 6 hours as needed        Vitals signs and nursing note reviewed.    Patient Vitals for the past 4 hrs:   Temp Pulse Resp BP SpO2   12/03/22 2207 99.4 °F (37.4 °C) (!) 115 22 130/77 98 %          Physical Exam  Constitutional:       General: She is not in acute distress. Appearance: Normal appearance. She is not ill-appearing. HENT:      Head: Normocephalic and atraumatic. Nose: No rhinorrhea. Mouth/Throat:      Mouth: Mucous membranes are moist.   Eyes:      Extraocular Movements: Extraocular movements intact. Cardiovascular:      Rate and Rhythm: Regular rhythm. Tachycardia present. Pulmonary:      Effort: Pulmonary effort is normal.      Breath sounds: Normal breath sounds. Abdominal:      General: Abdomen is flat. Bowel sounds are normal. There is no distension. Palpations: Abdomen is soft. Tenderness: There is no abdominal tenderness. Genitourinary:     Comments: On the left inner labia the patient has approximately 2 cm region of lesion consistent with warts. They are not vesicular in nature. There is not a single chancre. Musculoskeletal:         General: Normal range of motion. Cervical back: Normal range of motion. Skin:     General: Skin is warm and dry. Neurological:      General: No focal deficit present. Mental Status: She is alert. Psychiatric:         Mood and Affect: Mood normal.        Procedures    Results for orders placed or performed during the hospital encounter of 12/03/22   Pregnancy, Urine   Result Value Ref Range    HCG(Urine) Pregnancy Test Negative NEG          XR CHEST (2 VW)    (Results Pending)                       Voice dictation software was used during the making of this note. This software is not perfect and grammatical and other typographical errors may be present. This note has not been completely proofread for errors.      Mary Jo Collins MD  12/04/22 0002       Mary Jo Collins MD  12/04/22 2043

## 2022-12-04 NOTE — ED TRIAGE NOTES
Pt arrived to ED via [employer]. EMS from [facility]. Pt c/o SOB. EMS reports pt was wheezing on arrival, RR 40, , oxygen saturation 100% on RA, reports pt seemed very anxious. Pt given albuterol treatment with improvement in symptoms. Pt has history of asthma, does not have rescue inhaler. Pt reports productive cough for several days, with yellow sputum, and nausea. Pt also requesting to be tested for syphilis, reports vaginal pain and itching, with milky white discharge and odor.

## 2022-12-07 LAB
C TRACH RRNA SPEC QL NAA+PROBE: NEGATIVE
N GONORRHOEA RRNA SPEC QL NAA+PROBE: POSITIVE
SPECIMEN SOURCE: ABNORMAL

## 2022-12-07 NOTE — PROGRESS NOTES
ED pharmacist has attempted to contact Mitchell Mathewsalton on 12/07/22 regarding the recent results of their STI culture via the phone number on file. Patient was not treated while in the emergency department. Patients number on file is incorrect and there is no address listed. Called patients parent and left a call back number in case they see her. Alcides Dorantes, PharmD.   Emergency Medicine Clinical Pharmacist

## 2023-07-06 NOTE — PROGRESS NOTES
Problem: Self Care Deficits Care Plan (Adult) Goal: *Acute Goals and Plan of Care (Insert Text) Description 1. Patient will complete total body bathing and dressing with modified independence and adaptive equipment as needed. 2. Patient will complete toileting with modified independence and adaptive equipment as needed. 3. Patient will tolerate 20 minutes of OT treatment with up to minimal rest breaks to increase activity tolerance for ADLs. 4. Patient will complete functional transfers with modified independence and adaptive equipment as needed. 5. Patient will demonstrate modified independence with therapeutic exercise HEP to increase strength in LUE for increased safety and independence with functional transfers. 6. Patient will complete functional mobility for ADLs with modified independence and adaptive equipment as needed. Timeframe: 7 visits OCCUPATIONAL THERAPY: Initial Assessment and AM 4/21/2019 OBSERVATION:   
Payor: Buddy Joel / Plan: [place]ICA[place] / Product Type: [employer] /  
  
NAME/AGE/GENDER: Clementine Frazier is a 29 y.o. female PRIMARY DIAGNOSIS:  Dysarthria [R47.1] Dysarthria Dysarthria ICD-10: Treatment Diagnosis:  
 Generalized Muscle Weakness (M62.81) Dizziness and Giddiness (R42) Precautions/Allergies: 
  Fall precautions  Patient has no known allergies. ASSESSMENT:  
Ms. Joana Tenorio is a 29 y.o. female admitted with stuttering, chest pain, n/v. At baseline, pt lives with boyfriend in a 2 level home with 5 steps to enter, bedroom on second level and is independent with ADLs, ambulation, driving. Pt cares for her 3 kids, 2 of which are disabled. No recent falls. Upon arrival, pt is alert and agreeable to OT evaluation. General UE screen revealed AROM WFL and strength generally decreased in BUEs, LUE > RUE.  BUE sensation intact to light touch this date, thought pt reports Pt is a 36year old female admitted to TR4/TR4-A. Patient presents with:  Non-stress Test     Pt is  35w4d intra-uterine pregnancy. History obtained, consents signed. Oriented to room, staff, and plan of care. feeling LUE numbness at times. Coordination intact to Tej Fletcher. Pt noted to stutter throughout. Pt completes bed mobility with modified independence and sit to stand with SBA, reporting lightheadedness in standing that does not resolve with breathing technique, so pt returned to supine with SBA. Pt left with call bell within reach. Pt's deficits include decreased strength, decreased activity tolerance, decreased balance, decreased functional mobility and decreased functional transfers, all of which negatively impact safety and independence with ADLs. Pt is currently functioning below baseline and would benefit from continued OT to increase safety and independence with ADLs. Will follow. This section established at most recent assessment PROBLEM LIST (Impairments causing functional limitations): 
Decreased Strength Decreased ADL/Functional Activities Decreased Transfer Abilities Decreased Ambulation Ability/Technique Decreased Balance Increased Pain Decreased Activity Tolerance Increased Fatigue Decreased Dairy with Home Exercise Program 
 INTERVENTIONS PLANNED: (Benefits and precautions of occupational therapy have been discussed with the patient.) Activities of daily living training Adaptive equipment training Balance training Clothing management Donning&doffing training Group therapy Hygiene training Re-evaluation Therapeutic activity Therapeutic exercise TREATMENT PLAN: Frequency/Duration: Follow patient 3x/week to address above goals. Rehabilitation Potential For Stated Goals: Good REHAB RECOMMENDATIONS (at time of discharge pending progress):   
Placement: It is my opinion, based on this patient's performance to date, that Ms. Ely Olsen may benefit from [address] after discharge due to her functional deficits (listed above) that are likely to improve with skilled rehabilitation because she has an inability to tolerate transportation to an outpatient setting as evidenced by decreased activity tolerance, balance, functional transfers. Equipment:  
None at this time OCCUPATIONAL PROFILE AND HISTORY:  
History of Present Injury/Illness (Reason for Referral): 
See H&P. Past Medical History/Comorbidities: Ms. Rhonda Marks  has no past medical history on file. Ms. Rhonda Marks  has no past surgical history on file. Social History/Living Environment:  
Home Environment: Private residence # Steps to Enter: 5 One/Two Story Residence: Two story Living Alone: No 
Support Systems: Spouse/Significant Other/Partner Current DME Used/Available at Home: None Tub or Shower Type: Tub 
Prior Level of Function/Work/Activity: At baseline, pt lives with boyfriend in a 2 level home with 5 steps to enter, bedroom on second level and is independent with ADLs, ambulation, driving. Pt cares for her 3 kids, 2 of which are disabled. No recent falls. Dominant Side:  
      RIGHT Personal Factors:   
      Sex:  female Age:  29 y.o. Number of Personal Factors/Comorbidities that affect the Plan of Care: Brief history (0):  LOW COMPLEXITY ASSESSMENT OF OCCUPATIONAL PERFORMANCE[de-identified]  
Activities of Daily Living:  
Basic ADLs (From Assessment) Complex ADLs (From Assessment) Feeding: Independent Oral Facial Hygiene/Grooming: Stand-by assistance Bathing: Stand-by assistance Upper Body Dressing: Stand-by assistance Lower Body Dressing: Stand-by assistance Toileting: Stand by assistance Grooming/Bathing/Dressing Activities of Daily Living Cognitive Retraining Safety/Judgement: Fall prevention Bed/Mat Mobility Supine to Sit: Modified independent Sit to Supine: Modified independent Sit to Stand: Stand-by assistance Stand to Sit: Stand-by assistance Scooting: Modified independent Most Recent Physical Functioning:  
Gross Assessment: 
AROM: Within functional limits(BUEs) Strength: Generally decreased, functional(BUEs, LUE > RUE) Coordination: Within functional limits(BUEs) Sensation: Intact(BUEs, though pt reports experiencing L arm numbness at times) Posture: 
  
Balance: 
Sitting: Intact Standing - Static: Good Bed Mobility: 
Supine to Sit: Modified independent Sit to Supine: Modified independent Scooting: Modified independent Wheelchair Mobility: 
  
Transfers: 
Sit to Stand: Stand-by assistance Stand to Sit: Stand-by assistance Patient Vitals for the past 6 hrs: 
 BP BP Patient Position SpO2 Pulse 04/21/19 0516 104/71 At rest 93 % 78 Mental Status Neurologic State: Drowsy Orientation Level: Appropriate for age, Oriented X4 Cognition: Follows commands Perception: Appears intact Perseveration: No perseveration noted Safety/Judgement: Fall prevention Physical Skills Involved: 
Balance Strength Activity Tolerance Pain (Chronic) Cognitive Skills Affected (resulting in the inability to perform in a timely and safe manner): 
None  Psychosocial Skills Affected: 
Habits/Routines Environmental Adaptation Emotional Regulation Self-Awareness Awareness of Others Social Roles Number of elements that affect the Plan of Care: 5+:  HIGH COMPLEXITY CLINICAL DECISION MAKING:  
[employer] AM-PAC? ?6 Clicks? Daily Activity Inpatient Short Form How much help from another person does the patient currently need. .. Total A Lot A Little None 1. Putting on and taking off regular lower body clothing? ? 1   ? 2   ? 3   ? 4  
2. Bathing (including washing, rinsing, drying)? ? 1   ? 2   ? 3   ? 4  
3. Toileting, which includes using toilet, bedpan or urinal?   ? 1   ? 2   ? 3   ? 4  
4. Putting on and taking off regular upper body clothing? ? 1   ? 2   ? 3   ? 4  
5. Taking care of personal grooming such as brushing teeth? ? 1   ? 2   ? 3   ? 4  
6. Eating meals?    ? 1   ? 2   ? 3   ? 4  
 © 2007, Trustees of [address] [place] [address] [address] [address], under license to [employer]. All rights reserved Score:  Initial: 19 4/21/19 Most Recent: X (Date: -- ) Interpretation of Tool:  Represents activities that are increasingly more difficult (i.e. Bed mobility, Transfers, Gait). Medical Necessity:    
Patient demonstrates good 
 rehab potential due to higher previous functional level. Reason for Services/Other Comments: 
Patient continues to require skilled intervention due to inability to complete ADLs at prior level of independence Joyce John Use of outcome tool(s) and clinical judgement create a POC that gives a: LOW COMPLEXITY  
 
 
 
TREATMENT:  
(In addition to Assessment/Re-Assessment sessions the following treatments were rendered) Pre-treatment Symptoms/Complaints:   
Pain: Initial:  
Pain Intensity 1: 3 Pain Location 1: Head, Leg 
Pain Intervention(s) 1: Repositioned(RN aware)  Post Session:  same Assessment/Reassessment only, no treatment provided today Braces/Orthotics/Lines/Etc:  
IV Telemetry O2 Device: Room air Treatment/Session Assessment:   
Response to Treatment:  Assessment only Interdisciplinary Collaboration: Occupational Therapist 
Registered Nurse After treatment position/precautions:  
Supine in bed Bed alarm/tab alert on Bed/Chair-wheels locked Bed in low position Call light within reach Family at bedside Compliance with Program/Exercises: Compliant all of the time, Will assess as treatment progresses. Recommendations/Intent for next treatment session: \"Next visit will focus on advancements to more challenging activities and reduction in assistance provided\". Total Treatment Duration: OT Patient Time In/Time Out Time In: 0800 Time Out: [id number]unha OTR/IBETH

## 2023-08-14 NOTE — PROGRESS NOTES
Patient returned call, having fevers and not feeling well. Will return now for positive blood cultures. Oral Minoxidil Counseling- I discussed with the patient the risks of oral minoxidil including but not limited to shortness of breath, swelling of the feet or ankles, dizziness, lightheadedness, unwanted hair growth and allergic reaction.  The patient verbalized understanding of the proper use and possible adverse effects of oral minoxidil.  All of the patient's questions and concerns were addressed.

## (undated) DEVICE — DRAPE TWL SURG 16X26IN BLU ORB04] ALLCARE INC]

## (undated) DEVICE — SURGICAL PROCEDURE PACK BASIC ST FRANCIS

## (undated) DEVICE — BANDAGE,GAUZE,BULKEE II,4.5"X4.1YD,STRL: Brand: MEDLINE

## (undated) DEVICE — PAD,ABDOMINAL,5"X9",ST,LF,25/BX: Brand: MEDLINE INDUSTRIES, INC.

## (undated) DEVICE — SUT SLK 2-0SH 30IN BLK --

## (undated) DEVICE — SPONGE GZ W4XL4IN COT 12 PLY TYP VII WVN C FLD DSGN

## (undated) DEVICE — BLADE SURG NO15 S STL STR DISP GLASSVAN

## (undated) DEVICE — GAUZE PKG STRP IODOFRM 1/4X5YD --

## (undated) DEVICE — DRAPE,TOP,102X53,STERILE: Brand: MEDLINE

## (undated) DEVICE — REM POLYHESIVE ADULT PATIENT RETURN ELECTRODE: Brand: VALLEYLAB

## (undated) DEVICE — SHEET, DRAPE, SPLIT, STERILE: Brand: MEDLINE